# Patient Record
Sex: FEMALE | Race: WHITE | Employment: FULL TIME | URBAN - METROPOLITAN AREA
[De-identification: names, ages, dates, MRNs, and addresses within clinical notes are randomized per-mention and may not be internally consistent; named-entity substitution may affect disease eponyms.]

---

## 2017-02-16 DIAGNOSIS — R31.9 HEMATURIA: Primary | ICD-10-CM

## 2017-02-16 LAB
BILIRUBIN, POC: NORMAL
BLOOD URINE, POC: NORMAL
CLARITY, POC: NORMAL
COLOR, POC: NORMAL
GLUCOSE URINE, POC: NORMAL
KETONES, POC: NORMAL
LEUKOCYTE EST, POC: NORMAL
NITRITE, POC: NORMAL
PH, POC: 6
PROTEIN, POC: 30
SPECIFIC GRAVITY, POC: 1.03
UROBILINOGEN, POC: 1

## 2017-02-16 PROCEDURE — 81002 URINALYSIS NONAUTO W/O SCOPE: CPT | Performed by: INTERNAL MEDICINE

## 2017-02-17 LAB — URINE CULTURE, ROUTINE: NORMAL

## 2017-02-22 DIAGNOSIS — R10.11 RIGHT UPPER QUADRANT ABDOMINAL PAIN: Primary | ICD-10-CM

## 2017-02-23 ENCOUNTER — HOSPITAL ENCOUNTER (OUTPATIENT)
Dept: ULTRASOUND IMAGING | Age: 37
Discharge: HOME | End: 2017-02-23
Attending: INTERNAL MEDICINE | Admitting: INTERNAL MEDICINE

## 2017-02-23 DIAGNOSIS — R10.11 ABDOMINAL PAIN, RIGHT UPPER QUADRANT: ICD-10-CM

## 2017-02-23 DIAGNOSIS — R10.11 RIGHT UPPER QUADRANT PAIN: ICD-10-CM

## 2017-02-23 DIAGNOSIS — R10.11 RIGHT UPPER QUADRANT ABDOMINAL PAIN: ICD-10-CM

## 2017-02-23 LAB
A/G RATIO: 1.7 (ref 1.1–2.2)
ALBUMIN SERPL-MCNC: 4.1 G/DL (ref 3.4–5)
ALP BLD-CCNC: 61 U/L (ref 40–129)
ALT SERPL-CCNC: 13 U/L (ref 10–40)
AMYLASE: 39 U/L (ref 25–115)
ANION GAP SERPL CALCULATED.3IONS-SCNC: 16 MMOL/L (ref 3–16)
AST SERPL-CCNC: 13 U/L (ref 15–37)
BASOPHILS ABSOLUTE: 0.1 K/UL (ref 0–0.2)
BASOPHILS RELATIVE PERCENT: 1.2 %
BILIRUB SERPL-MCNC: 0.4 MG/DL (ref 0–1)
BUN BLDV-MCNC: 13 MG/DL (ref 7–20)
CALCIUM SERPL-MCNC: 8.6 MG/DL (ref 8.3–10.6)
CHLORIDE BLD-SCNC: 102 MMOL/L (ref 99–110)
CO2: 22 MMOL/L (ref 21–32)
CREAT SERPL-MCNC: 0.6 MG/DL (ref 0.6–1.1)
EOSINOPHILS ABSOLUTE: 0.3 K/UL (ref 0–0.6)
EOSINOPHILS RELATIVE PERCENT: 4.8 %
GFR AFRICAN AMERICAN: >60
GFR NON-AFRICAN AMERICAN: >60
GLOBULIN: 2.4 G/DL
GLUCOSE BLD-MCNC: 83 MG/DL (ref 70–99)
HCT VFR BLD CALC: 41.9 % (ref 36–48)
HEMOGLOBIN: 14.2 G/DL (ref 12–16)
LIPASE: 57 U/L (ref 13–60)
LYMPHOCYTES ABSOLUTE: 1.4 K/UL (ref 1–5.1)
LYMPHOCYTES RELATIVE PERCENT: 25.5 %
MCH RBC QN AUTO: 31.6 PG (ref 26–34)
MCHC RBC AUTO-ENTMCNC: 33.9 G/DL (ref 31–36)
MCV RBC AUTO: 93.4 FL (ref 80–100)
MONOCYTES ABSOLUTE: 0.3 K/UL (ref 0–1.3)
MONOCYTES RELATIVE PERCENT: 6.1 %
NEUTROPHILS ABSOLUTE: 3.5 K/UL (ref 1.7–7.7)
NEUTROPHILS RELATIVE PERCENT: 62.4 %
PDW BLD-RTO: 12.3 % (ref 12.4–15.4)
PLATELET # BLD: 219 K/UL (ref 135–450)
PMV BLD AUTO: 10.4 FL (ref 5–10.5)
POTASSIUM SERPL-SCNC: 3.9 MMOL/L (ref 3.5–5.1)
RBC # BLD: 4.49 M/UL (ref 4–5.2)
SODIUM BLD-SCNC: 140 MMOL/L (ref 136–145)
TOTAL PROTEIN: 6.5 G/DL (ref 6.4–8.2)
WBC # BLD: 5.7 K/UL (ref 4–11)

## 2017-03-01 DIAGNOSIS — R10.11 ABDOMINAL PAIN, ACUTE, RIGHT UPPER QUADRANT: Primary | ICD-10-CM

## 2017-06-16 ENCOUNTER — TELEPHONE (OUTPATIENT)
Dept: INTERNAL MEDICINE CLINIC | Age: 37
End: 2017-06-16

## 2017-06-16 RX ORDER — AZITHROMYCIN 250 MG/1
TABLET, FILM COATED ORAL
Qty: 1 PACKET | Refills: 0 | Status: SHIPPED | OUTPATIENT
Start: 2017-06-16 | End: 2017-06-26

## 2017-08-28 ENCOUNTER — TELEPHONE (OUTPATIENT)
Dept: INTERNAL MEDICINE CLINIC | Age: 37
End: 2017-08-28

## 2017-08-28 RX ORDER — VALACYCLOVIR HYDROCHLORIDE 1 G/1
1000 TABLET, FILM COATED ORAL 2 TIMES DAILY
Qty: 20 TABLET | Refills: 0 | Status: SHIPPED | OUTPATIENT
Start: 2017-08-28 | End: 2017-10-27 | Stop reason: SDUPTHER

## 2017-09-13 ENCOUNTER — OFFICE VISIT (OUTPATIENT)
Dept: INTERNAL MEDICINE CLINIC | Age: 37
End: 2017-09-13

## 2017-09-13 DIAGNOSIS — M77.12 LATERAL EPICONDYLITIS OF LEFT ELBOW: Primary | ICD-10-CM

## 2017-09-13 PROCEDURE — 20605 DRAIN/INJ JOINT/BURSA W/O US: CPT | Performed by: INTERNAL MEDICINE

## 2017-10-19 PROCEDURE — 96372 THER/PROPH/DIAG INJ SC/IM: CPT | Performed by: INTERNAL MEDICINE

## 2017-10-19 RX ORDER — KETOROLAC TROMETHAMINE 30 MG/ML
60 INJECTION, SOLUTION INTRAMUSCULAR; INTRAVENOUS ONCE
Status: COMPLETED | OUTPATIENT
Start: 2017-10-19 | End: 2017-10-19

## 2017-10-19 RX ADMIN — KETOROLAC TROMETHAMINE 60 MG: 30 INJECTION, SOLUTION INTRAMUSCULAR; INTRAVENOUS at 12:00

## 2017-10-27 RX ORDER — VALACYCLOVIR HYDROCHLORIDE 1 G/1
1000 TABLET, FILM COATED ORAL 2 TIMES DAILY
Qty: 20 TABLET | Refills: 0 | Status: SHIPPED | OUTPATIENT
Start: 2017-10-27 | End: 2018-04-03 | Stop reason: SDUPTHER

## 2017-12-07 ENCOUNTER — TELEPHONE (OUTPATIENT)
Dept: INTERNAL MEDICINE CLINIC | Age: 37
End: 2017-12-07

## 2017-12-07 RX ORDER — GUAIFENESIN AND CODEINE PHOSPHATE 100; 10 MG/5ML; MG/5ML
5 SOLUTION ORAL 2 TIMES DAILY PRN
Qty: 120 ML | Refills: 0 | Status: SHIPPED | OUTPATIENT
Start: 2017-12-07 | End: 2017-12-14

## 2017-12-07 NOTE — TELEPHONE ENCOUNTER
Pt asking for a message to be sent back to . She has had flu like symptoms all week. Has been out of work all week as well. She was prescribed Tessalon pearls and is taking them. She is also taking Nyquil. She would like to know if  could please prescribe a cough medicine for her cough during the evening? Dr Claudette Hopkins is not in today. Asking if covering  would please address.

## 2017-12-08 RX ORDER — FUROSEMIDE 20 MG/1
TABLET ORAL
Qty: 90 TABLET | Refills: 1 | Status: SHIPPED | OUTPATIENT
Start: 2017-12-08 | End: 2019-01-08 | Stop reason: SDUPTHER

## 2017-12-22 ENCOUNTER — TELEPHONE (OUTPATIENT)
Dept: INTERNAL MEDICINE CLINIC | Age: 37
End: 2017-12-22

## 2017-12-22 RX ORDER — OSELTAMIVIR PHOSPHATE 75 MG/1
75 CAPSULE ORAL 2 TIMES DAILY
Qty: 10 CAPSULE | Refills: 0 | Status: SHIPPED | OUTPATIENT
Start: 2017-12-22 | End: 2017-12-27

## 2017-12-29 ENCOUNTER — OFFICE VISIT (OUTPATIENT)
Dept: INTERNAL MEDICINE CLINIC | Age: 37
End: 2017-12-29

## 2017-12-29 VITALS
HEART RATE: 84 BPM | OXYGEN SATURATION: 100 % | TEMPERATURE: 98.1 F | SYSTOLIC BLOOD PRESSURE: 126 MMHG | DIASTOLIC BLOOD PRESSURE: 84 MMHG

## 2017-12-29 DIAGNOSIS — J45.41 MODERATE PERSISTENT ASTHMATIC BRONCHITIS WITH ACUTE EXACERBATION: ICD-10-CM

## 2017-12-29 DIAGNOSIS — J18.9 COMMUNITY ACQUIRED PNEUMONIA OF RIGHT LUNG, UNSPECIFIED PART OF LUNG: Primary | ICD-10-CM

## 2017-12-29 PROCEDURE — 99213 OFFICE O/P EST LOW 20 MIN: CPT | Performed by: NURSE PRACTITIONER

## 2017-12-29 RX ORDER — METHYLPREDNISOLONE 4 MG/1
TABLET ORAL
Qty: 1 KIT | Refills: 0 | Status: SHIPPED | OUTPATIENT
Start: 2017-12-29 | End: 2018-01-04

## 2017-12-29 RX ORDER — GUAIFENESIN AND CODEINE PHOSPHATE 100; 10 MG/5ML; MG/5ML
10 SOLUTION ORAL 2 TIMES DAILY PRN
Qty: 280 ML | Refills: 0 | Status: SHIPPED | OUTPATIENT
Start: 2017-12-29 | End: 2018-01-05

## 2017-12-29 RX ORDER — ALBUTEROL SULFATE 90 UG/1
1 AEROSOL, METERED RESPIRATORY (INHALATION) EVERY 4 HOURS PRN
Qty: 1 INHALER | Refills: 5 | Status: SHIPPED | OUTPATIENT
Start: 2017-12-29 | End: 2018-11-20 | Stop reason: SDUPTHER

## 2017-12-29 RX ORDER — AZITHROMYCIN 250 MG/1
TABLET, FILM COATED ORAL
Qty: 1 PACKET | Refills: 0 | Status: SHIPPED | OUTPATIENT
Start: 2017-12-29 | End: 2018-01-03

## 2018-01-01 ASSESSMENT — ENCOUNTER SYMPTOMS
SORE THROAT: 1
COUGH: 1

## 2018-01-02 NOTE — PROGRESS NOTES
Subjective:      Patient ID: Connor Fontana is a 42 y.o. female. CC: Flu, illness    HPI: The patient presents to the office today for an acute visit. She was diagnosed with influenza last week. She was treated with Tamiflu. Today, she reports ongoing and worsening illness with congestion, fevers, cough. She has taken numerous OTC remedies. Review of Systems   Constitutional: Positive for chills, diaphoresis, fatigue and fever. HENT: Positive for congestion and sore throat. Respiratory: Positive for cough. Objective:   Physical Exam   Constitutional: She appears well-developed and well-nourished. She is cooperative. Non-toxic appearance. She appears ill. No distress. HENT:   Head: Normocephalic and atraumatic. Eyes: Conjunctivae are normal. No scleral icterus. Pulmonary/Chest: Effort normal. No respiratory distress. She has wheezes. She has rales in the right middle field and the right lower field. Neurological: She is alert. Skin: Skin is warm and dry. She is not diaphoretic. /84   Pulse 84   Temp 98.1 °F (36.7 °C) (Oral)   SpO2 100%         Assessment/Plan:  Ethan Coleman was seen today for shortness of breath. Diagnoses and all orders for this visit:    Community acquired pneumonia of right lung, unspecified part of lung  -     azithromycin (ZITHROMAX) 250 MG tablet; Take 2 tabs (500 mg) on Day 1, and take 1 tab (250 mg) on days 2 through 5.  -     methylPREDNISolone (MEDROL, ALFREDO,) 4 MG tablet; Take as directed  -     guaiFENesin-codeine (TUSSI-ORGANIDIN NR) 100-10 MG/5ML syrup; Take 10 mLs by mouth 2 times daily as needed for Cough for up to 7 days. -     albuterol sulfate HFA (PROVENTIL HFA) 108 (90 Base) MCG/ACT inhaler; Inhale 1 puff into the lungs every 4 hours as needed for Wheezing or Shortness of Breath    Moderate persistent asthmatic bronchitis with acute exacerbation  -     azithromycin (ZITHROMAX) 250 MG tablet;  Take 2 tabs (500 mg) on Day 1, and take 1 tab

## 2018-01-12 ENCOUNTER — OFFICE VISIT (OUTPATIENT)
Dept: INTERNAL MEDICINE CLINIC | Age: 38
End: 2018-01-12

## 2018-01-12 VITALS — SYSTOLIC BLOOD PRESSURE: 132 MMHG | HEART RATE: 88 BPM | DIASTOLIC BLOOD PRESSURE: 72 MMHG

## 2018-01-12 DIAGNOSIS — M77.12 LATERAL EPICONDYLITIS OF LEFT ELBOW: Primary | ICD-10-CM

## 2018-04-03 RX ORDER — VALACYCLOVIR HYDROCHLORIDE 1 G/1
TABLET, FILM COATED ORAL
Qty: 20 TABLET | Refills: 0 | Status: SHIPPED | OUTPATIENT
Start: 2018-04-03 | End: 2018-04-04 | Stop reason: SDUPTHER

## 2018-04-04 RX ORDER — VALACYCLOVIR HYDROCHLORIDE 1 G/1
TABLET, FILM COATED ORAL
Qty: 20 TABLET | Refills: 0 | Status: SHIPPED | OUTPATIENT
Start: 2018-04-04 | End: 2019-07-09 | Stop reason: ALTCHOICE

## 2018-06-20 ENCOUNTER — OFFICE VISIT (OUTPATIENT)
Dept: INTERNAL MEDICINE CLINIC | Age: 38
End: 2018-06-20

## 2018-06-20 VITALS
DIASTOLIC BLOOD PRESSURE: 76 MMHG | HEIGHT: 62 IN | WEIGHT: 148 LBS | SYSTOLIC BLOOD PRESSURE: 100 MMHG | HEART RATE: 84 BPM | BODY MASS INDEX: 27.23 KG/M2

## 2018-06-20 DIAGNOSIS — Z00.00 ANNUAL PHYSICAL EXAM: Primary | ICD-10-CM

## 2018-06-20 DIAGNOSIS — Z12.39 BREAST CANCER SCREENING: ICD-10-CM

## 2018-06-20 DIAGNOSIS — Z80.3 FAMILY HISTORY OF BREAST CANCER: ICD-10-CM

## 2018-06-20 DIAGNOSIS — Z23 NEED FOR 23-POLYVALENT PNEUMOCOCCAL POLYSACCHARIDE VACCINE: ICD-10-CM

## 2018-06-20 DIAGNOSIS — N92.1 MENORRHAGIA WITH IRREGULAR CYCLE: Chronic | ICD-10-CM

## 2018-06-20 LAB
A/G RATIO: 1.8 (ref 1.1–2.2)
ALBUMIN SERPL-MCNC: 4.3 G/DL (ref 3.4–5)
ALP BLD-CCNC: 68 U/L (ref 40–129)
ALT SERPL-CCNC: 13 U/L (ref 10–40)
ANION GAP SERPL CALCULATED.3IONS-SCNC: 14 MMOL/L (ref 3–16)
AST SERPL-CCNC: 13 U/L (ref 15–37)
BASOPHILS ABSOLUTE: 0.1 K/UL (ref 0–0.2)
BASOPHILS RELATIVE PERCENT: 1 %
BILIRUB SERPL-MCNC: 0.6 MG/DL (ref 0–1)
BUN BLDV-MCNC: 9 MG/DL (ref 7–20)
CALCIUM SERPL-MCNC: 8.9 MG/DL (ref 8.3–10.6)
CHLORIDE BLD-SCNC: 101 MMOL/L (ref 99–110)
CHOLESTEROL, TOTAL: 160 MG/DL (ref 0–199)
CO2: 24 MMOL/L (ref 21–32)
CREAT SERPL-MCNC: 0.5 MG/DL (ref 0.6–1.1)
EOSINOPHILS ABSOLUTE: 0.2 K/UL (ref 0–0.6)
EOSINOPHILS RELATIVE PERCENT: 2.6 %
GFR AFRICAN AMERICAN: >60
GFR NON-AFRICAN AMERICAN: >60
GLOBULIN: 2.4 G/DL
GLUCOSE BLD-MCNC: 76 MG/DL (ref 70–99)
HCT VFR BLD CALC: 41.8 % (ref 36–48)
HDLC SERPL-MCNC: 83 MG/DL (ref 40–60)
HEMOGLOBIN: 14.4 G/DL (ref 12–16)
LDL CHOLESTEROL CALCULATED: 60 MG/DL
LYMPHOCYTES ABSOLUTE: 1.8 K/UL (ref 1–5.1)
LYMPHOCYTES RELATIVE PERCENT: 27.7 %
MCH RBC QN AUTO: 32.6 PG (ref 26–34)
MCHC RBC AUTO-ENTMCNC: 34.5 G/DL (ref 31–36)
MCV RBC AUTO: 94.5 FL (ref 80–100)
MONOCYTES ABSOLUTE: 0.5 K/UL (ref 0–1.3)
MONOCYTES RELATIVE PERCENT: 7.8 %
NEUTROPHILS ABSOLUTE: 3.9 K/UL (ref 1.7–7.7)
NEUTROPHILS RELATIVE PERCENT: 60.9 %
PDW BLD-RTO: 13.2 % (ref 12.4–15.4)
PLATELET # BLD: 236 K/UL (ref 135–450)
PMV BLD AUTO: 10.6 FL (ref 5–10.5)
POTASSIUM SERPL-SCNC: 4.1 MMOL/L (ref 3.5–5.1)
RBC # BLD: 4.43 M/UL (ref 4–5.2)
SODIUM BLD-SCNC: 139 MMOL/L (ref 136–145)
TOTAL PROTEIN: 6.7 G/DL (ref 6.4–8.2)
TRIGL SERPL-MCNC: 83 MG/DL (ref 0–150)
VLDLC SERPL CALC-MCNC: 17 MG/DL
WBC # BLD: 6.4 K/UL (ref 4–11)

## 2018-06-20 PROCEDURE — 90732 PPSV23 VACC 2 YRS+ SUBQ/IM: CPT | Performed by: NURSE PRACTITIONER

## 2018-06-20 PROCEDURE — 99396 PREV VISIT EST AGE 40-64: CPT | Performed by: NURSE PRACTITIONER

## 2018-06-20 PROCEDURE — 90471 IMMUNIZATION ADMIN: CPT | Performed by: NURSE PRACTITIONER

## 2018-08-22 ENCOUNTER — HOSPITAL ENCOUNTER (OUTPATIENT)
Dept: MAMMOGRAPHY | Age: 38
Discharge: HOME | End: 2018-08-22
Attending: INTERNAL MEDICINE | Admitting: INTERNAL MEDICINE

## 2018-08-22 DIAGNOSIS — Z12.31 VISIT FOR SCREENING MAMMOGRAM: ICD-10-CM

## 2018-09-12 DIAGNOSIS — M77.12 LATERAL EPICONDYLITIS OF LEFT ELBOW: Primary | ICD-10-CM

## 2018-11-20 ENCOUNTER — OFFICE VISIT (OUTPATIENT)
Dept: INTERNAL MEDICINE CLINIC | Age: 38
End: 2018-11-20
Payer: COMMERCIAL

## 2018-11-20 VITALS
SYSTOLIC BLOOD PRESSURE: 122 MMHG | DIASTOLIC BLOOD PRESSURE: 86 MMHG | HEIGHT: 61 IN | HEART RATE: 96 BPM | BODY MASS INDEX: 27.96 KG/M2

## 2018-11-20 DIAGNOSIS — B00.2 RECURRENT ORAL HERPES SIMPLEX: Primary | ICD-10-CM

## 2018-11-20 DIAGNOSIS — E66.09 OBESITY DUE TO EXCESS CALORIES WITHOUT SERIOUS COMORBIDITY, UNSPECIFIED CLASSIFICATION: ICD-10-CM

## 2018-11-20 DIAGNOSIS — J45.41 MODERATE PERSISTENT ASTHMATIC BRONCHITIS WITH ACUTE EXACERBATION: ICD-10-CM

## 2018-11-20 PROCEDURE — 99214 OFFICE O/P EST MOD 30 MIN: CPT | Performed by: INTERNAL MEDICINE

## 2018-11-20 RX ORDER — ALBUTEROL SULFATE 90 UG/1
1 AEROSOL, METERED RESPIRATORY (INHALATION) EVERY 4 HOURS PRN
Qty: 1 INHALER | Refills: 5 | Status: SHIPPED | OUTPATIENT
Start: 2018-11-20 | End: 2019-08-26 | Stop reason: SDUPTHER

## 2018-11-20 RX ORDER — VALACYCLOVIR HYDROCHLORIDE 500 MG/1
500 TABLET, FILM COATED ORAL DAILY
Qty: 90 TABLET | Refills: 3 | Status: SHIPPED | OUTPATIENT
Start: 2018-11-20 | End: 2019-02-12 | Stop reason: SDUPTHER

## 2018-11-20 ASSESSMENT — ENCOUNTER SYMPTOMS
COUGH: 0
BLOOD IN STOOL: 0
SHORTNESS OF BREATH: 0
DIARRHEA: 0
CONSTIPATION: 0

## 2018-12-04 RX ORDER — NITROFURANTOIN 25; 75 MG/1; MG/1
100 CAPSULE ORAL 2 TIMES DAILY
Qty: 6 CAPSULE | Refills: 0 | Status: SHIPPED | OUTPATIENT
Start: 2018-12-04 | End: 2018-12-07

## 2019-01-08 RX ORDER — FUROSEMIDE 20 MG/1
20 TABLET ORAL DAILY
Qty: 90 TABLET | Refills: 1 | Status: SHIPPED | OUTPATIENT
Start: 2019-01-08 | End: 2019-02-12 | Stop reason: SDUPTHER

## 2019-01-14 ENCOUNTER — HOSPITAL ENCOUNTER (OUTPATIENT)
Dept: ULTRASOUND IMAGING | Age: 39
Discharge: HOME | End: 2019-01-14
Payer: COMMERCIAL

## 2019-01-14 ENCOUNTER — HOSPITAL ENCOUNTER (OUTPATIENT)
Dept: WOMENS IMAGING | Age: 39
Discharge: HOME | End: 2019-01-14
Payer: COMMERCIAL

## 2019-01-14 DIAGNOSIS — N63.0 BREAST MASS: ICD-10-CM

## 2019-01-14 DIAGNOSIS — N63.10 BREAST MASS, RIGHT: Primary | ICD-10-CM

## 2019-01-14 DIAGNOSIS — N63.10 BREAST MASS, RIGHT: ICD-10-CM

## 2019-01-14 PROCEDURE — G0279 TOMOSYNTHESIS, MAMMO: HCPCS

## 2019-01-14 PROCEDURE — 76642 ULTRASOUND BREAST LIMITED: CPT

## 2019-02-12 DIAGNOSIS — B00.2 RECURRENT ORAL HERPES SIMPLEX: ICD-10-CM

## 2019-02-12 RX ORDER — FUROSEMIDE 20 MG/1
20 TABLET ORAL DAILY
Qty: 90 TABLET | Refills: 1 | Status: SHIPPED | OUTPATIENT
Start: 2019-02-12 | End: 2019-11-05 | Stop reason: SDUPTHER

## 2019-02-12 RX ORDER — VALACYCLOVIR HYDROCHLORIDE 500 MG/1
500 TABLET, FILM COATED ORAL DAILY
Qty: 90 TABLET | Refills: 3 | Status: SHIPPED | OUTPATIENT
Start: 2019-02-12 | End: 2019-02-12 | Stop reason: SDUPTHER

## 2019-02-12 RX ORDER — VALACYCLOVIR HYDROCHLORIDE 500 MG/1
500 TABLET, FILM COATED ORAL DAILY
Qty: 90 TABLET | Refills: 3 | Status: SHIPPED | OUTPATIENT
Start: 2019-02-12 | End: 2019-11-19 | Stop reason: SDUPTHER

## 2019-02-12 RX ORDER — FUROSEMIDE 20 MG/1
20 TABLET ORAL DAILY
Qty: 90 TABLET | Refills: 1 | Status: SHIPPED | OUTPATIENT
Start: 2019-02-12 | End: 2019-02-12 | Stop reason: SDUPTHER

## 2019-02-18 ENCOUNTER — HOSPITAL ENCOUNTER (OUTPATIENT)
Age: 39
Discharge: HOME | End: 2019-02-18
Payer: COMMERCIAL

## 2019-02-18 ENCOUNTER — HOSPITAL ENCOUNTER (OUTPATIENT)
Dept: GENERAL RADIOLOGY | Age: 39
Discharge: HOME | End: 2019-02-18
Payer: COMMERCIAL

## 2019-02-18 DIAGNOSIS — M77.12 LATERAL EPICONDYLITIS OF LEFT ELBOW: ICD-10-CM

## 2019-02-18 PROCEDURE — 73080 X-RAY EXAM OF ELBOW: CPT

## 2019-02-19 ENCOUNTER — OFFICE VISIT (OUTPATIENT)
Dept: RHEUMATOLOGY | Age: 39
End: 2019-02-19
Payer: COMMERCIAL

## 2019-02-19 VITALS
BODY MASS INDEX: 28.34 KG/M2 | HEIGHT: 62 IN | DIASTOLIC BLOOD PRESSURE: 80 MMHG | HEART RATE: 84 BPM | WEIGHT: 154 LBS | SYSTOLIC BLOOD PRESSURE: 138 MMHG

## 2019-02-19 DIAGNOSIS — M19.90 INFLAMMATORY ARTHRITIS: ICD-10-CM

## 2019-02-19 DIAGNOSIS — M19.90 INFLAMMATORY ARTHRITIS: Primary | ICD-10-CM

## 2019-02-19 DIAGNOSIS — I73.00 RAYNAUD'S DISEASE WITHOUT GANGRENE: ICD-10-CM

## 2019-02-19 DIAGNOSIS — M77.12 LATERAL EPICONDYLITIS OF LEFT ELBOW: ICD-10-CM

## 2019-02-19 DIAGNOSIS — M77.01 GOLFERS ELBOW OF RIGHT UPPER EXTREMITY: ICD-10-CM

## 2019-02-19 DIAGNOSIS — Q84.6 DYSPLASIA OF TOENAIL: ICD-10-CM

## 2019-02-19 LAB
ALBUMIN SERPL-MCNC: 4.4 G/DL (ref 3.4–5)
ALP BLD-CCNC: 62 U/L (ref 40–129)
ALT SERPL-CCNC: 14 U/L (ref 10–40)
AST SERPL-CCNC: 14 U/L (ref 15–37)
BILIRUB SERPL-MCNC: 0.4 MG/DL (ref 0–1)
BILIRUBIN DIRECT: <0.2 MG/DL (ref 0–0.3)
BILIRUBIN, INDIRECT: ABNORMAL MG/DL (ref 0–1)
C-REACTIVE PROTEIN: 3.7 MG/L (ref 0–5.1)
CREAT SERPL-MCNC: 0.6 MG/DL (ref 0.6–1.1)
GFR AFRICAN AMERICAN: >60
GFR NON-AFRICAN AMERICAN: >60
HCT VFR BLD CALC: 42 % (ref 36–48)
HEMOGLOBIN: 14.5 G/DL (ref 12–16)
MCH RBC QN AUTO: 32.2 PG (ref 26–34)
MCHC RBC AUTO-ENTMCNC: 34.5 G/DL (ref 31–36)
MCV RBC AUTO: 93.4 FL (ref 80–100)
PDW BLD-RTO: 13.1 % (ref 12.4–15.4)
PLATELET # BLD: 239 K/UL (ref 135–450)
PMV BLD AUTO: 10.4 FL (ref 5–10.5)
RBC # BLD: 4.5 M/UL (ref 4–5.2)
RHEUMATOID FACTOR: <10 IU/ML
TOTAL CK: 45 U/L (ref 26–192)
TOTAL PROTEIN: 7 G/DL (ref 6.4–8.2)
TSH REFLEX: 1.83 UIU/ML (ref 0.27–4.2)
VITAMIN D 25-HYDROXY: 20.6 NG/ML
WBC # BLD: 5.2 K/UL (ref 4–11)

## 2019-02-19 PROCEDURE — 99242 OFF/OP CONSLTJ NEW/EST SF 20: CPT | Performed by: INTERNAL MEDICINE

## 2019-02-19 RX ORDER — NIFEDIPINE 30 MG/1
30 TABLET, EXTENDED RELEASE ORAL DAILY
Qty: 90 TABLET | Refills: 1 | Status: SHIPPED | OUTPATIENT
Start: 2019-02-19 | End: 2019-07-09 | Stop reason: ALTCHOICE

## 2019-02-19 RX ORDER — DICLOFENAC SODIUM 75 MG/1
75 TABLET, DELAYED RELEASE ORAL 2 TIMES DAILY
Qty: 60 TABLET | Refills: 4 | Status: SHIPPED | OUTPATIENT
Start: 2019-02-19 | End: 2019-07-09 | Stop reason: ALTCHOICE

## 2019-02-20 LAB
ALDOLASE: 2 U/L (ref 1.5–8.1)
ANGIOTENSIN CONVERTING ENZYME: 23 U/L (ref 9–67)
ANTI-NUCLEAR ANTIBODY (ANA): NEGATIVE

## 2019-02-21 LAB — CCP IGG ANTIBODIES: 5 UNITS (ref 0–19)

## 2019-03-07 ENCOUNTER — OFFICE VISIT (OUTPATIENT)
Dept: ORTHOPEDIC SURGERY | Age: 39
End: 2019-03-07
Payer: COMMERCIAL

## 2019-03-07 VITALS
BODY MASS INDEX: 28.34 KG/M2 | WEIGHT: 154 LBS | HEIGHT: 62 IN | HEART RATE: 98 BPM | SYSTOLIC BLOOD PRESSURE: 123 MMHG | DIASTOLIC BLOOD PRESSURE: 83 MMHG

## 2019-03-07 DIAGNOSIS — M77.12 LATERAL EPICONDYLITIS OF LEFT ELBOW: Primary | ICD-10-CM

## 2019-03-07 PROCEDURE — 99203 OFFICE O/P NEW LOW 30 MIN: CPT | Performed by: NURSE PRACTITIONER

## 2019-03-07 RX ORDER — PREDNISONE 10 MG/1
TABLET ORAL
Qty: 42 TABLET | Refills: 0 | Status: SHIPPED | OUTPATIENT
Start: 2019-03-07 | End: 2019-07-09 | Stop reason: ALTCHOICE

## 2019-03-08 ENCOUNTER — TELEPHONE (OUTPATIENT)
Dept: ORTHOPEDIC SURGERY | Age: 39
End: 2019-03-08

## 2019-03-12 DIAGNOSIS — M25.522 LEFT ELBOW PAIN: Primary | ICD-10-CM

## 2019-04-04 ENCOUNTER — HOSPITAL ENCOUNTER (OUTPATIENT)
Dept: MRI IMAGING | Age: 39
Discharge: HOME | End: 2019-04-04
Payer: COMMERCIAL

## 2019-04-04 DIAGNOSIS — M25.522 LEFT ELBOW PAIN: ICD-10-CM

## 2019-04-04 PROCEDURE — 73221 MRI JOINT UPR EXTREM W/O DYE: CPT

## 2019-04-10 ENCOUNTER — OFFICE VISIT (OUTPATIENT)
Dept: ORTHOPEDIC SURGERY | Age: 39
End: 2019-04-10
Payer: COMMERCIAL

## 2019-04-10 VITALS
SYSTOLIC BLOOD PRESSURE: 133 MMHG | HEIGHT: 62 IN | BODY MASS INDEX: 28.34 KG/M2 | DIASTOLIC BLOOD PRESSURE: 84 MMHG | HEART RATE: 98 BPM | WEIGHT: 154 LBS | RESPIRATION RATE: 16 BRPM

## 2019-04-10 DIAGNOSIS — M77.12 LATERAL EPICONDYLITIS OF LEFT ELBOW: Primary | ICD-10-CM

## 2019-04-10 PROCEDURE — L3908 WHO COCK-UP NONMOLDE PRE OTS: HCPCS | Performed by: ORTHOPAEDIC SURGERY

## 2019-04-10 PROCEDURE — 99203 OFFICE O/P NEW LOW 30 MIN: CPT | Performed by: ORTHOPAEDIC SURGERY

## 2019-07-09 ENCOUNTER — OFFICE VISIT (OUTPATIENT)
Dept: INTERNAL MEDICINE CLINIC | Age: 39
End: 2019-07-09
Payer: COMMERCIAL

## 2019-07-09 VITALS — DIASTOLIC BLOOD PRESSURE: 84 MMHG | HEART RATE: 86 BPM | SYSTOLIC BLOOD PRESSURE: 124 MMHG

## 2019-07-09 DIAGNOSIS — J45.20 MILD INTERMITTENT ASTHMA, UNSPECIFIED WHETHER COMPLICATED: ICD-10-CM

## 2019-07-09 DIAGNOSIS — F41.9 ANXIETY: ICD-10-CM

## 2019-07-09 PROBLEM — J45.909 ASTHMA: Status: ACTIVE | Noted: 2019-07-09

## 2019-07-09 PROCEDURE — 99213 OFFICE O/P EST LOW 20 MIN: CPT | Performed by: INTERNAL MEDICINE

## 2019-07-09 ASSESSMENT — PATIENT HEALTH QUESTIONNAIRE - PHQ9
SUM OF ALL RESPONSES TO PHQ QUESTIONS 1-9: 0
1. LITTLE INTEREST OR PLEASURE IN DOING THINGS: 0
SUM OF ALL RESPONSES TO PHQ QUESTIONS 1-9: 0
2. FEELING DOWN, DEPRESSED OR HOPELESS: 0
SUM OF ALL RESPONSES TO PHQ9 QUESTIONS 1 & 2: 0

## 2019-08-26 DIAGNOSIS — J45.41 MODERATE PERSISTENT ASTHMATIC BRONCHITIS WITH ACUTE EXACERBATION: ICD-10-CM

## 2019-08-26 RX ORDER — ALBUTEROL SULFATE 90 UG/1
1 AEROSOL, METERED RESPIRATORY (INHALATION) EVERY 4 HOURS PRN
Qty: 1 INHALER | Refills: 5 | Status: SHIPPED | OUTPATIENT
Start: 2019-08-26 | End: 2019-11-05 | Stop reason: SDUPTHER

## 2019-08-28 ENCOUNTER — HOSPITAL ENCOUNTER (OUTPATIENT)
Dept: MAMMOGRAPHY | Age: 39
Discharge: HOME | End: 2019-09-01
Payer: COMMERCIAL

## 2019-08-28 DIAGNOSIS — Z12.31 VISIT FOR SCREENING MAMMOGRAM: ICD-10-CM

## 2019-08-28 PROCEDURE — 77063 BREAST TOMOSYNTHESIS BI: CPT

## 2019-11-05 DIAGNOSIS — J45.41 MODERATE PERSISTENT ASTHMATIC BRONCHITIS WITH ACUTE EXACERBATION: ICD-10-CM

## 2019-11-05 RX ORDER — FUROSEMIDE 20 MG/1
20 TABLET ORAL DAILY
Qty: 90 TABLET | Refills: 1 | Status: SHIPPED | OUTPATIENT
Start: 2019-11-05 | End: 2020-05-04

## 2019-11-05 RX ORDER — ALBUTEROL SULFATE 90 UG/1
1 AEROSOL, METERED RESPIRATORY (INHALATION) EVERY 4 HOURS PRN
Qty: 1 INHALER | Refills: 5 | Status: SHIPPED | OUTPATIENT
Start: 2019-11-05

## 2019-11-19 DIAGNOSIS — B00.2 RECURRENT ORAL HERPES SIMPLEX: ICD-10-CM

## 2019-11-19 RX ORDER — VALACYCLOVIR HYDROCHLORIDE 500 MG/1
500 TABLET, FILM COATED ORAL DAILY
Qty: 90 TABLET | Refills: 3 | Status: SHIPPED | OUTPATIENT
Start: 2019-11-19 | End: 2020-12-21

## 2020-02-04 ENCOUNTER — OFFICE VISIT (OUTPATIENT)
Dept: INTERNAL MEDICINE CLINIC | Age: 40
End: 2020-02-04
Payer: COMMERCIAL

## 2020-02-04 VITALS — HEART RATE: 99 BPM | SYSTOLIC BLOOD PRESSURE: 136 MMHG | DIASTOLIC BLOOD PRESSURE: 86 MMHG

## 2020-02-04 PROCEDURE — G0444 DEPRESSION SCREEN ANNUAL: HCPCS | Performed by: INTERNAL MEDICINE

## 2020-02-04 PROCEDURE — 99214 OFFICE O/P EST MOD 30 MIN: CPT | Performed by: INTERNAL MEDICINE

## 2020-02-04 RX ORDER — BUPROPION HYDROCHLORIDE 150 MG/1
150 TABLET ORAL EVERY MORNING
Qty: 30 TABLET | Refills: 1 | Status: SHIPPED | OUTPATIENT
Start: 2020-02-04 | End: 2020-03-09 | Stop reason: SDUPTHER

## 2020-02-04 RX ORDER — ALPRAZOLAM 0.5 MG/1
0.5 TABLET ORAL NIGHTLY PRN
Qty: 30 TABLET | Refills: 0 | Status: SHIPPED | OUTPATIENT
Start: 2020-02-04 | End: 2020-05-05 | Stop reason: SDUPTHER

## 2020-02-04 ASSESSMENT — PATIENT HEALTH QUESTIONNAIRE - PHQ9
8. MOVING OR SPEAKING SO SLOWLY THAT OTHER PEOPLE COULD HAVE NOTICED. OR THE OPPOSITE, BEING SO FIGETY OR RESTLESS THAT YOU HAVE BEEN MOVING AROUND A LOT MORE THAN USUAL: 0
1. LITTLE INTEREST OR PLEASURE IN DOING THINGS: 3
SUM OF ALL RESPONSES TO PHQ QUESTIONS 1-9: 15
4. FEELING TIRED OR HAVING LITTLE ENERGY: 3
5. POOR APPETITE OR OVEREATING: 0
3. TROUBLE FALLING OR STAYING ASLEEP: 3
2. FEELING DOWN, DEPRESSED OR HOPELESS: 3
10. IF YOU CHECKED OFF ANY PROBLEMS, HOW DIFFICULT HAVE THESE PROBLEMS MADE IT FOR YOU TO DO YOUR WORK, TAKE CARE OF THINGS AT HOME, OR GET ALONG WITH OTHER PEOPLE: 3
9. THOUGHTS THAT YOU WOULD BE BETTER OFF DEAD, OR OF HURTING YOURSELF: 0
SUM OF ALL RESPONSES TO PHQ9 QUESTIONS 1 & 2: 6
6. FEELING BAD ABOUT YOURSELF - OR THAT YOU ARE A FAILURE OR HAVE LET YOURSELF OR YOUR FAMILY DOWN: 3
7. TROUBLE CONCENTRATING ON THINGS, SUCH AS READING THE NEWSPAPER OR WATCHING TELEVISION: 0
SUM OF ALL RESPONSES TO PHQ QUESTIONS 1-9: 15

## 2020-02-04 NOTE — PROGRESS NOTES
Osiris Shankar comes for depression. She does not take medication for Her condition. She is not suicidal or homicidal.  The depression is  associated with excessive sleeping, anhedonia and anxiety. Patient does not have bipolar disease. PHQ Scores 2/4/2020 7/9/2019   PHQ2 Score 6 0   PHQ9 Score 15 0     Interpretation of Total Score Depression Severity: 1-4 = Minimal depression, 5-9 = Mild depression, 10-14 = Moderate depression, 15-19 = Moderately severe depression, 20-27 = Severe depression    Review of Systems    Vitals:    02/04/20 1517   BP: 136/86   Pulse:        Physical Exam  Vitals signs reviewed. Constitutional:       General: She is not in acute distress. Appearance: She is well-developed. She is not diaphoretic. HENT:      Head: Normocephalic and atraumatic. Neck:      Musculoskeletal: Normal range of motion. Neck rigidity present. No muscular tenderness. Vascular: No carotid bruit. Cardiovascular:      Rate and Rhythm: Normal rate and regular rhythm. Pulses: Normal pulses. Heart sounds: No murmur. No friction rub. No gallop. Pulmonary:      Effort: Pulmonary effort is normal. No respiratory distress. Breath sounds: No stridor. No wheezing, rhonchi or rales. Lymphadenopathy:      Cervical: No cervical adenopathy. Neurological:      Mental Status: She is alert and oriented to person, place, and time. Cranial Nerves: No cranial nerve deficit. Psychiatric:         Behavior: Behavior normal.         Thought Content: Thought content normal.         Judgment: Judgment normal.           Current Outpatient Medications:     ALPRAZolam (XANAX) 0.5 MG tablet, Take 1 tablet by mouth nightly as needed for Sleep for up to 30 days. , Disp: 30 tablet, Rfl: 0    buPROPion (WELLBUTRIN XL) 150 MG extended release tablet, Take 1 tablet by mouth every morning, Disp: 30 tablet, Rfl: 1    valACYclovir (VALTREX) 500 MG tablet, Take 1 tablet by mouth daily, Disp: 90

## 2020-02-05 DIAGNOSIS — F33.1 MODERATE EPISODE OF RECURRENT MAJOR DEPRESSIVE DISORDER (HCC): ICD-10-CM

## 2020-02-05 DIAGNOSIS — R00.2 PALPITATIONS: ICD-10-CM

## 2020-02-05 DIAGNOSIS — F41.9 ANXIETY: ICD-10-CM

## 2020-02-05 LAB
T4 FREE: 1.2 NG/DL (ref 0.9–1.8)
TSH SERPL DL<=0.05 MIU/L-ACNC: 1.15 UIU/ML (ref 0.27–4.2)
VITAMIN D 25-HYDROXY: 23 NG/ML

## 2020-02-06 RX ORDER — ERGOCALCIFEROL 1.25 MG/1
50000 CAPSULE ORAL WEEKLY
Qty: 4 CAPSULE | Refills: 3 | Status: SHIPPED | OUTPATIENT
Start: 2020-02-06 | End: 2020-03-06

## 2020-02-09 LAB
SEX HORMONE BINDING GLOBULIN: 52 NMOL/L (ref 30–135)
TESTOSTERONE FREE-NONMALE: 7.6 PG/ML (ref 1.1–5.8)
TESTOSTERONE TOTAL: 56 NG/DL (ref 20–70)

## 2020-05-05 ENCOUNTER — VIRTUAL VISIT (OUTPATIENT)
Dept: INTERNAL MEDICINE CLINIC | Age: 40
End: 2020-05-05
Payer: COMMERCIAL

## 2020-05-05 VITALS — TEMPERATURE: 98.3 F | BODY MASS INDEX: 28.17 KG/M2 | HEIGHT: 62 IN | HEART RATE: 100 BPM

## 2020-05-05 PROCEDURE — 99213 OFFICE O/P EST LOW 20 MIN: CPT | Performed by: INTERNAL MEDICINE

## 2020-05-05 RX ORDER — ALPRAZOLAM 0.5 MG/1
0.5 TABLET ORAL NIGHTLY PRN
Qty: 30 TABLET | Refills: 0 | Status: SHIPPED | OUTPATIENT
Start: 2020-05-05 | End: 2020-06-04

## 2020-05-05 NOTE — PROGRESS NOTES
2020      TELEHEALTH EVALUATION -- Audio/Visual (During NXBOV-06 public health emergency)    HPI:    Eugenia Valderrama (:  1980) has requested an audio/video evaluation for the following concern(s): Anxiety which has been much better recently with an increase in her Wellbutrin. Patient takes Xanax on an as-needed basis for when her anxiety gets overwhelmed. She is not using an excessive amount. Patient feels well today. She was drinking a abel at the time that she was on her video visit. PHQ Scores 2020   PHQ2 Score 6 0   PHQ9 Score 15 0     Interpretation of Total Score Depression Severity: 1-4 = Minimal depression, 5-9 = Mild depression, 10-14 = Moderate depression, 15-19 = Moderately severe depression, 20-27 = Severe depression    Review of Systems    Prior to Visit Medications    Medication Sig Taking? Authorizing Provider   ALPRAZolam Wandalee Opitz) 0.5 MG tablet Take 1 tablet by mouth nightly as needed for Sleep for up to 30 days.  Yes Werner Serna MD   furosemide (LASIX) 20 MG tablet TAKE 1 TABLET BY MOUTH ONE TIME A DAY Yes Werner Serna MD   buPROPion (WELLBUTRIN XL) 300 MG extended release tablet Take 1 tablet by mouth every morning Yes Werner Serna MD   vitamin D (ERGOCALCIFEROL) 1.25 MG (17859 UT) CAPS capsule TAKE 1 CAPSULE BY MOUTH ONCE A WEEK(TAKE FOR THREE MONTHS THEN SWITCH TO VITAMIN D3 2000IU FOR MAINTENANCE) Yes Werner Serna MD   valACYclovir (VALTREX) 500 MG tablet Take 1 tablet by mouth daily Yes Werner Serna MD   albuterol sulfate HFA (PROVENTIL HFA) 108 (90 Base) MCG/ACT inhaler Inhale 1 puff into the lungs every 4 hours as needed for Wheezing or Shortness of Breath Yes Werner Serna MD   Phentermine HCl (ADIPEX-P PO) Take by mouth Yes Historical Provider, MD       Social History     Tobacco Use    Smoking status: Former Smoker     Last attempt to quit: 10/12/2012     Years since quittin.5    Smokeless tobacco: Never Used   Substance Use (and/or legal guardian) has also been advised to contact this office for worsening conditions or problems, and seek emergency medical treatment and/or call 911 if deemed necessary. Patient identification was verified at the start of the visit: Yes    Total time spent on this encounter: Not billed by time    Services were provided through a video synchronous discussion virtually to substitute for in-person clinic visit. Patient and provider were located at their individual homes. --Lyly Siddiqui MD on 5/5/2020 at 3:47 PM    An electronic signature was used to authenticate this note.

## 2020-07-07 ENCOUNTER — OFFICE VISIT (OUTPATIENT)
Dept: ORTHOPEDIC SURGERY | Age: 40
End: 2020-07-07
Payer: COMMERCIAL

## 2020-07-07 VITALS — WEIGHT: 155 LBS | BODY MASS INDEX: 28.52 KG/M2 | HEIGHT: 62 IN

## 2020-07-07 PROCEDURE — L3760 EO ADJ JT PREFAB CUSTOM FIT: HCPCS | Performed by: ORTHOPAEDIC SURGERY

## 2020-07-07 PROCEDURE — 99203 OFFICE O/P NEW LOW 30 MIN: CPT | Performed by: ORTHOPAEDIC SURGERY

## 2020-07-07 NOTE — PROGRESS NOTES
Chief Complaint  Elbow Pain (l elbow )      History of Present Illness:  Adenike Zamudio is a 41 y.o. female , right-hand-dominant, works as an [place] at [employer], presenting with history of left elbow injury  Date of injury: 7/5/2020  Mechanism of injury: The patient stumbled while missing a step off of a boat and landed with her elbow and left side onto a boat trailer and the ground. She had immediate pain in which she describes a swelling and possible deformity. She was with a friend who is a nurse who manipulated her elbow immediately and she subsequently was placed into a splint after seeing an urgent care in an outside area  She feels that her swelling has improved overall in her left elbow but she continues to have pain. No numbness or tingling described  She has a history remotely of left elbow pain but has not had any recent pain or symptoms prior to this most specific events  She denies any other specific complaints in her left upper extremity    Medical History  Patient's medications, allergies, past medical, surgical, social and family histories were reviewed and updated as appropriate. Review of Systems  Pertinent items are noted in HPI  Review of systems reviewed from Patient History Form dated on 7/7/20 in [facility] and available in the patient's chart under the Media tab. Vital Signs  There were no vitals filed for this visit. General Exam:   Constitutional: Patient is adequately groomed with no evidence of malnutrition  Mental Status: The patient is oriented to time, place and person. The patient's mood and affect are appropriate. Lymphatic: The lymphatic examination bilaterally reveals all areas to be without enlargement or induration. Neurological: The patient has good coordination. There is no weakness or sensory deficit.      Left Elbow/left upper extremity examination  Inspection: Minimal swelling of the left elbow with small abrasion posteriorly  No evidence of obvious deformity or additional skin changes at the elbow forearm or wrist      Palpation: Tenderness to palpation at the lateral elbow specifically near the radiocapitellar joint and more diffusely tenderness at the medial ulnohumeral joint  No palpable gap, crepitus, or prominence    Range of Motion: Elbow range of motion tested with hesitation today by patient approximately 20 to 120 degrees of flexion with no obvious crepitus or gross instability  70 degrees of pronation and supination with no mechanical symptoms and mild discomfort    Strength: Active elbow flexion and extension with no gross instability  5 out of 5 strength EPL FPL thumb abduction  5-5 AIN/PIN/interossei:    Special Tests: Gross sensation intact median ulnar radial nerve without deficit  Brisk capillary refill all fingers  Negative Tinel's at cubital tunnel  2+ brachioradialis triceps tendon reflex negative Oz sign bilaterally      Skin: There are no additional worrisome rashes, ulcerations or lesions. Gait: normal    Circulation normal    Additional Comments:     Additional Examinations:  Right Upper Extremity:  Examination of the right upper extremity does not show any tenderness, deformity or injury. Range of motion is unremarkable. There is no gross instability. There are no rashes, ulcerations or lesions. Strength and tone are normal.      Radiology:     X-rays obtained and reviewed in office:  Views 3  Location left elbow  Impression no obvious fracture dislocation or subluxation throughout the left ulnohumeral radiocapitellar joint  No evidence of loose body or additional osseous abnormality    Assessment: 41-year-old female presenting with fall left elbow pain  1. Left elbow sprain, possible simple elbow dislocation with subsequent reduction, possible occult fracture    Impression:  Encounter Diagnosis   Name Primary?     Left elbow pain Yes       Office Procedures:  Orders Placed This Encounter   Procedures    XR ELBOW LEFT (MIN 3 VIEWS) Treatment Plan: I spoke with patient today regarding her exam as well as differential diagnosis  She does describe a history of a possible elbow deformity and swelling right after her injury. She does not have much swelling or elbow effusion today but she may have had a dislocation or subluxation event that was reduced at the time of the injury. Reports from outside facility reported possible occult fracture per the patient's description. I do not appreciate any fracture on today's images but I did discuss the possibility of an occult radial head or additional fracture that may not be seen on imaging. At this point she demonstrates clinically a grossly stable elbow with range of motion gently tested today. I would recommend at this point plan to place the patient into a hinged elbow brace and keeping it locked over the next 1 week to treat as a suspected elbow dislocation or instability event. We will monitor closely and see her back in 1 week for repeat clinical evaluation. If she demonstrates continued stability then at that point I would recommend beginning some progressive elbow range of motion guided and possible therapy for assistance  She will continue at this point to avoid lifting or gripping with the left upper extremity.   She understands precautions today and may continue with oral anti-inflammatory as needed for her discomfort

## 2020-07-09 ENCOUNTER — TELEPHONE (OUTPATIENT)
Dept: ORTHOPEDIC SURGERY | Age: 40
End: 2020-07-09

## 2020-07-09 NOTE — TELEPHONE ENCOUNTER
07/09/2020 DME   NO AUTHORIZATION REQUIRED. MEDICAL NECESSITY. VALID & BILLABLE. TIER 1 -EMPLOYEE    IND  DED  $1000/ $  152.35 MET  FAM DED  $2000/ $1213.51 MET    CO INS      90/10 AFTER DED  CO INS      $1000/ $0 MET  CO INS      $2000/ $257.81 MET    IND  OOP  $2000/ $  192.35  FAM OOP  $4000/ $5519.86    PER [facility] @ [facility] 144 REF [id number].   AP

## 2020-09-14 ENCOUNTER — VIRTUAL VISIT (OUTPATIENT)
Dept: INTERNAL MEDICINE CLINIC | Age: 40
End: 2020-09-14
Payer: COMMERCIAL

## 2020-09-14 PROCEDURE — 99212 OFFICE O/P EST SF 10 MIN: CPT | Performed by: INTERNAL MEDICINE

## 2020-09-14 ASSESSMENT — PATIENT HEALTH QUESTIONNAIRE - PHQ9
SUM OF ALL RESPONSES TO PHQ9 QUESTIONS 1 & 2: 0
2. FEELING DOWN, DEPRESSED OR HOPELESS: 0
SUM OF ALL RESPONSES TO PHQ QUESTIONS 1-9: 0
SUM OF ALL RESPONSES TO PHQ QUESTIONS 1-9: 0
1. LITTLE INTEREST OR PLEASURE IN DOING THINGS: 0

## 2020-09-14 NOTE — PROGRESS NOTES
2020      TELEHEALTH EVALUATION -- Audio/Visual (During YKQIT-23 public health emergency)    HPI:    Jame Orozco (:  1980) has requested an audio/video evaluation for the following concern(s): Anxiety/depression. The patient has been taking her Wellbutrin and has been doing much better. She has no new complaints today. Patient reports that she has been adjusting to her 's issues from a health perspective since his transient ischemic attack several months ago. PHQ Scores 2020   PHQ2 Score 0 6 0   PHQ9 Score 0 15 0     Interpretation of Total Score Depression Severity: 1-4 = Minimal depression, 5-9 = Mild depression, 10-14 = Moderate depression, 15-19 = Moderately severe depression, 20-27 = Severe depression    Review of Systems    Prior to Visit Medications    Medication Sig Taking? Authorizing Provider   furosemide (LASIX) 20 MG tablet TAKE 1 TABLET BY MOUTH ONE TIME A DAY Yes Vidhya Gutiérrez MD   buPROPion (WELLBUTRIN XL) 300 MG extended release tablet Take 1 tablet by mouth every morning Yes Vidhya Gutiérrez MD   valACYclovir (VALTREX) 500 MG tablet Take 1 tablet by mouth daily Yes Vidhya Gutiérrez MD   albuterol sulfate HFA (PROVENTIL HFA) 108 (90 Base) MCG/ACT inhaler Inhale 1 puff into the lungs every 4 hours as needed for Wheezing or Shortness of Breath Yes Vidhya Gutiérrez MD   Phentermine HCl (ADIPEX-P PO) Take by mouth Yes Historical Provider, MD       Social History     Tobacco Use    Smoking status: Former Smoker     Last attempt to quit: 10/12/2012     Years since quittin.9    Smokeless tobacco: Never Used   Substance Use Topics    Alcohol use: Yes    Drug use: No          PHYSICAL EXAMINATION:  There were no vitals filed for this visit. Physical Exam  Vitals signs reviewed. Constitutional:       Appearance: Normal appearance. HENT:      Head: Normocephalic and atraumatic. Eyes:      Extraocular Movements: Extraocular movements intact. Conjunctiva/sclera: Conjunctivae normal.      Pupils: Pupils are equal, round, and reactive to light. Pulmonary:      Effort: Pulmonary effort is normal.   Neurological:      General: No focal deficit present. Mental Status: She is alert and oriented to person, place, and time. Cranial Nerves: No cranial nerve deficit. Psychiatric:         Mood and Affect: Mood normal.         Behavior: Behavior normal.         Thought Content: Thought content normal.         Judgment: Judgment normal.         ASSESSMENT/PLAN:  Assessment/Plan:  Samia Gomez was seen today for other. Diagnoses and all orders for this visit:    Moderate episode of recurrent major depressive disorder (HCC)  -     CBC Auto Differential; Future  -     Comprehensive Metabolic Panel; Future  -     Lipid Panel; Future  -     T4, Free; Future  -     TSH without Reflex; Future        No follow-ups on file. Reshma Fountain is a 41 y.o. female being evaluated by a Virtual Visit (video visit) encounter to address concerns as mentioned above. A caregiver was present when appropriate. Due to this being a TeleHealth encounter (During XBF-06 public health emergency), evaluation of the following organ systems was limited: Vitals/Constitutional/EENT/Resp/CV/GI//MS/Neuro/Skin/Heme-Lymph-Imm. Pursuant to the emergency declaration under the [address] [place] [address] authority and the [employer] Act, this Virtual Visit was conducted with patient's (and/or legal guardian's) consent, to reduce the patient's risk of exposure to COVID-19 and provide necessary medical care. The patient (and/or legal guardian) has also been advised to contact this office for worsening conditions or problems, and seek emergency medical treatment and/or call 911 if deemed necessary.      Patient identification was verified at the start of the visit: Yes    Total time spent on this

## 2020-09-15 DIAGNOSIS — F33.1 MODERATE EPISODE OF RECURRENT MAJOR DEPRESSIVE DISORDER (HCC): ICD-10-CM

## 2020-09-15 LAB
A/G RATIO: 1.7 (ref 1.1–2.2)
ALBUMIN SERPL-MCNC: 4.5 G/DL (ref 3.4–5)
ALP BLD-CCNC: 71 U/L (ref 40–129)
ALT SERPL-CCNC: 23 U/L (ref 10–40)
ANION GAP SERPL CALCULATED.3IONS-SCNC: 12 MMOL/L (ref 3–16)
AST SERPL-CCNC: 24 U/L (ref 15–37)
BASOPHILS ABSOLUTE: 0.1 K/UL (ref 0–0.2)
BASOPHILS RELATIVE PERCENT: 1.2 %
BILIRUB SERPL-MCNC: 0.4 MG/DL (ref 0–1)
BUN BLDV-MCNC: 9 MG/DL (ref 7–20)
CALCIUM SERPL-MCNC: 9.4 MG/DL (ref 8.3–10.6)
CHLORIDE BLD-SCNC: 103 MMOL/L (ref 99–110)
CHOLESTEROL, TOTAL: 188 MG/DL (ref 0–199)
CO2: 25 MMOL/L (ref 21–32)
CREAT SERPL-MCNC: 0.7 MG/DL (ref 0.6–1.1)
EOSINOPHILS ABSOLUTE: 0.2 K/UL (ref 0–0.6)
EOSINOPHILS RELATIVE PERCENT: 3.8 %
GFR AFRICAN AMERICAN: >60
GFR NON-AFRICAN AMERICAN: >60
GLOBULIN: 2.7 G/DL
GLUCOSE BLD-MCNC: 87 MG/DL (ref 70–99)
HCT VFR BLD CALC: 44.6 % (ref 36–48)
HDLC SERPL-MCNC: 76 MG/DL (ref 40–60)
HEMOGLOBIN: 15.2 G/DL (ref 12–16)
LDL CHOLESTEROL CALCULATED: 87 MG/DL
LYMPHOCYTES ABSOLUTE: 1.4 K/UL (ref 1–5.1)
LYMPHOCYTES RELATIVE PERCENT: 24.6 %
MCH RBC QN AUTO: 32.7 PG (ref 26–34)
MCHC RBC AUTO-ENTMCNC: 34.1 G/DL (ref 31–36)
MCV RBC AUTO: 95.7 FL (ref 80–100)
MONOCYTES ABSOLUTE: 0.4 K/UL (ref 0–1.3)
MONOCYTES RELATIVE PERCENT: 6.5 %
NEUTROPHILS ABSOLUTE: 3.6 K/UL (ref 1.7–7.7)
NEUTROPHILS RELATIVE PERCENT: 63.9 %
PDW BLD-RTO: 12.4 % (ref 12.4–15.4)
PLATELET # BLD: 255 K/UL (ref 135–450)
PMV BLD AUTO: 10.7 FL (ref 5–10.5)
POTASSIUM SERPL-SCNC: 4.3 MMOL/L (ref 3.5–5.1)
RBC # BLD: 4.67 M/UL (ref 4–5.2)
SODIUM BLD-SCNC: 140 MMOL/L (ref 136–145)
T4 FREE: 1.2 NG/DL (ref 0.9–1.8)
TOTAL PROTEIN: 7.2 G/DL (ref 6.4–8.2)
TRIGL SERPL-MCNC: 127 MG/DL (ref 0–150)
TSH SERPL DL<=0.05 MIU/L-ACNC: 1.13 UIU/ML (ref 0.27–4.2)
VLDLC SERPL CALC-MCNC: 25 MG/DL
WBC # BLD: 5.7 K/UL (ref 4–11)

## 2020-09-28 ENCOUNTER — HOSPITAL ENCOUNTER (OUTPATIENT)
Dept: MAMMOGRAPHY | Age: 40
Discharge: HOME | End: 2020-10-02
Payer: COMMERCIAL

## 2020-09-28 PROCEDURE — 77063 BREAST TOMOSYNTHESIS BI: CPT

## 2020-10-08 ENCOUNTER — NURSE ONLY (OUTPATIENT)
Dept: INTERNAL MEDICINE CLINIC | Age: 40
End: 2020-10-08

## 2020-10-09 ENCOUNTER — E-VISIT (OUTPATIENT)
Dept: FAMILY MEDICINE CLINIC | Age: 40
End: 2020-10-09
Payer: COMMERCIAL

## 2020-10-09 PROCEDURE — 99421 OL DIG E/M SVC 5-10 MIN: CPT | Performed by: FAMILY MEDICINE

## 2020-10-09 RX ORDER — PHENAZOPYRIDINE HYDROCHLORIDE 100 MG/1
100 TABLET, FILM COATED ORAL 3 TIMES DAILY PRN
Qty: 6 TABLET | Refills: 0 | Status: SHIPPED | OUTPATIENT
Start: 2020-10-09 | End: 2020-10-11

## 2020-10-09 RX ORDER — NITROFURANTOIN 25; 75 MG/1; MG/1
100 CAPSULE ORAL 2 TIMES DAILY
Qty: 10 CAPSULE | Refills: 0 | Status: SHIPPED | OUTPATIENT
Start: 2020-10-09 | End: 2020-11-20 | Stop reason: ALTCHOICE

## 2020-10-19 RX ORDER — ALPRAZOLAM 0.5 MG/1
0.5 TABLET ORAL 3 TIMES DAILY PRN
Qty: 15 TABLET | Refills: 1 | Status: SHIPPED | OUTPATIENT
Start: 2020-10-19 | End: 2021-07-21

## 2020-11-09 ENCOUNTER — OFFICE VISIT (OUTPATIENT)
Dept: ORTHOPEDIC SURGERY | Age: 40
End: 2020-11-09
Payer: COMMERCIAL

## 2020-11-09 VITALS — WEIGHT: 150 LBS | HEIGHT: 62 IN | TEMPERATURE: 97.2 F | BODY MASS INDEX: 27.6 KG/M2

## 2020-11-09 PROCEDURE — L3260 AMBULATORY SURGICAL BOOT EAC: HCPCS | Performed by: PHYSICIAN ASSISTANT

## 2020-11-09 PROCEDURE — 99213 OFFICE O/P EST LOW 20 MIN: CPT | Performed by: PHYSICIAN ASSISTANT

## 2020-11-09 RX ORDER — HYDROCODONE BITARTRATE AND ACETAMINOPHEN 5; 325 MG/1; MG/1
1 TABLET ORAL EVERY 6 HOURS PRN
Qty: 28 TABLET | Refills: 0 | Status: SHIPPED | OUTPATIENT
Start: 2020-11-09 | End: 2020-11-16

## 2020-11-09 NOTE — PROGRESS NOTES
esomeprazole (NEXIUM) 20 MG delayed release capsule Take 1 capsule by mouth daily 90 capsule 3    furosemide (LASIX) 20 MG tablet TAKE 1 TABLET BY MOUTH ONE TIME A DAY 90 tablet 1    buPROPion (WELLBUTRIN XL) 300 MG extended release tablet Take 1 tablet by mouth every morning 30 tablet 0    valACYclovir (VALTREX) 500 MG tablet Take 1 tablet by mouth daily 90 tablet 3    albuterol sulfate HFA (PROVENTIL HFA) 108 (90 Base) MCG/ACT inhaler Inhale 1 puff into the lungs every 4 hours as needed for Wheezing or Shortness of Breath 1 Inhaler 5    Phentermine HCl (ADIPEX-P PO) Take by mouth       No current facility-administered medications for this visit. Allergies, social and family histories were reviewed and updated as appropriate. Review of Systems:  Relevant review of systems reviewed and available in the patient's chart under the 'MEDIA' tab. Vital Signs:  Temp 97.2 °F (36.2 °C) (Infrared)   Ht 5' 2\" (1.575 m)   Wt 150 lb (68 kg)   BMI 27.44 kg/m²     General Exam:   Constitutional: Patient is adequately groomed with no evidence of malnutrition  Mental Status: The patient is oriented to time, place and person. The patient's mood and affect are appropriate. Lymphatic: The lymphatic examination bilaterally reveals all areas to be without enlargement or induration. Vascular: Examination reveals no swelling or calf tenderness. Peripheral pulses are palpable and 2+. Neurological: The patient has good coordination. There is no focal weakness or sensory deficit. Right Foot Examination:    Inspection: Normal ankle and foot alignment. Normal muscle contours and no significant limb length discrepancy. No gross atrophy in any particular myotome. There is moderate swelling of the right great toe with ecchymosis. Nail plate is undamaged. There are no abrasions or lacerations to the toe. There is no erythema, induration or warmth to suggest an infectious process.      Palpation: Patient has significant tenderness on palpation of the right great toe. Ankle Range of Motion:   Plantarflexion: 50°   Dorsiflexion: 20°   Inversion: 30°   Eversion: 20°    Skin: There are no rashes, ulcerations or lesions. Sensation to light touch intact    Circulation: The limb is warm and well perfused. Distal pulses intact. Capillary refill is intact. Edema: none. Venous stasis changes: none. Gait: Patient has a antalgic gait and is taking short strides. Radiology:     X-rays obtained and reviewed in office:  Views: 3 view right foot including AP, lateral and oblique  Impression: There is a fracture of the distal phalanx of the right great toe. Orders:  Orders Placed This Encounter   Procedures    XR FOOT RIGHT (MIN 3 VIEWS)     3V Rt Foot Non-WB     Standing Status:   Future     Number of Occurrences:   1     Standing Expiration Date:   11/9/2021     Order Specific Question:   Reason for exam:     Answer: Foot Pain    DJO Post-Op Shoe     Patient was prescribed a DJO Post-Op Shoe. The right foot will require stabilization / immobilization from this semi-rigid / rigid orthosis to improve their function. The orthosis will assist in protecting the affected area, provide functional support and facilitate healing. Patient was instructed to progress ambulation weight bearing as tolerated in the device. The patient was educated and fit by a healthcare professional with expert knowledge and specialization in brace application. Verbal and written instructions for the use of and application of this item were provided. They were instructed to contact the office immediately should the brace result in increased pain, decreased sensation, increased swelling or worsening of the condition. Impression:  Encounter Diagnoses   Name Primary?     Closed displaced fracture of distal phalanx of right great toe, initial encounter Yes    Injury of right great toe, initial encounter        Treatment Plan:          Patient

## 2020-11-10 ENCOUNTER — TELEPHONE (OUTPATIENT)
Dept: ORTHOPEDIC SURGERY | Age: 40
End: 2020-11-10

## 2020-11-10 NOTE — TELEPHONE ENCOUNTER
OTHER:  She says her boot is not working its painful and she doesn't feel stable in it.   She can be reached at [phone]

## 2020-11-10 NOTE — TELEPHONE ENCOUNTER
Spoke to patient and offered to move her appt with Dr Sofia Robertson up to this week but she was unable to due to her work schedule. She did say her mom has a boot and she tried it and it fits and feels much better than the shoe, and she is fine with that and will keep her scheduled appt.

## 2020-11-20 ENCOUNTER — OFFICE VISIT (OUTPATIENT)
Dept: INTERNAL MEDICINE CLINIC | Age: 40
End: 2020-11-20
Payer: COMMERCIAL

## 2020-11-20 VITALS — DIASTOLIC BLOOD PRESSURE: 76 MMHG | HEART RATE: 98 BPM | TEMPERATURE: 99.9 F | SYSTOLIC BLOOD PRESSURE: 140 MMHG

## 2020-11-20 PROCEDURE — 99213 OFFICE O/P EST LOW 20 MIN: CPT | Performed by: NURSE PRACTITIONER

## 2020-11-20 RX ORDER — SULFAMETHOXAZOLE AND TRIMETHOPRIM 800; 160 MG/1; MG/1
1 TABLET ORAL 2 TIMES DAILY
Qty: 14 TABLET | Refills: 0 | Status: SHIPPED | OUTPATIENT
Start: 2020-11-20 | End: 2020-11-27

## 2020-11-24 ENCOUNTER — OFFICE VISIT (OUTPATIENT)
Dept: ORTHOPEDIC SURGERY | Age: 40
End: 2020-11-24
Payer: COMMERCIAL

## 2020-11-24 VITALS — TEMPERATURE: 97.4 F | HEIGHT: 62 IN | WEIGHT: 150 LBS | BODY MASS INDEX: 27.6 KG/M2

## 2020-11-24 PROCEDURE — 99213 OFFICE O/P EST LOW 20 MIN: CPT | Performed by: ORTHOPAEDIC SURGERY

## 2020-11-24 PROCEDURE — 28490 TREAT BIG TOE FRACTURE: CPT | Performed by: ORTHOPAEDIC SURGERY

## 2020-11-24 ASSESSMENT — ENCOUNTER SYMPTOMS: COLOR CHANGE: 1

## 2020-11-24 NOTE — PROGRESS NOTES
SUBJECTIVE:    Patient ID: Donavan Salazar is a 41 y.o. female. CC: Concern for ear infection    HPI: The patient presents to the office for an acute visit. Patient is an employee here and requested to be seen for concerns about possible ear infection. Over the past few days, she has had worsening left ear pain. This is pain primarily located in the external ear and around her piercing. She noticed some redness and swelling. She denies any drainage. She denies any known injury or trauma. She denies any fever. Current Outpatient Medications   Medication Sig Dispense Refill    sulfamethoxazole-trimethoprim (BACTRIM DS) 800-160 MG per tablet Take 1 tablet by mouth 2 times daily for 7 days 14 tablet 0    esomeprazole (NEXIUM) 20 MG delayed release capsule Take 1 capsule by mouth daily 90 capsule 3    furosemide (LASIX) 20 MG tablet TAKE 1 TABLET BY MOUTH ONE TIME A DAY 90 tablet 1    buPROPion (WELLBUTRIN XL) 300 MG extended release tablet Take 1 tablet by mouth every morning 30 tablet 0    valACYclovir (VALTREX) 500 MG tablet Take 1 tablet by mouth daily 90 tablet 3    albuterol sulfate HFA (PROVENTIL HFA) 108 (90 Base) MCG/ACT inhaler Inhale 1 puff into the lungs every 4 hours as needed for Wheezing or Shortness of Breath 1 Inhaler 5    Phentermine HCl (ADIPEX-P PO) Take by mouth       No current facility-administered medications for this visit. Review of Systems   Constitutional: Negative for fever. Skin: Positive for color change and wound. OBJECTIVE:  Physical Exam  Constitutional:       Appearance: Normal appearance. HENT:      Head: Normocephalic and atraumatic. Ears:      Comments: Daith piercing of inner cartilage of ear on left with redness, mild swelling, and tenderness. The ear canal itself is mildly swollen with redness. I appreciate no drainage. The tympanic membrane is normal in appearance. Skin:     General: Skin is warm and dry.    Neurological: General: No focal deficit present. Mental Status: She is alert and oriented to person, place, and time. Psychiatric:         Mood and Affect: Mood normal.         Behavior: Behavior normal.        BP (!) 140/76   Pulse 98   Temp 99.9 °F (37.7 °C) (Temporal)      PHQ Scores 9/14/2020 2/4/2020 7/9/2019   PHQ2 Score 0 6 0   PHQ9 Score 0 15 0     Interpretation of Total Score Depression Severity: 1-4 = Minimal depression, 5-9 = Mild depression, 10-14 = Moderate depression, 15-19 = Moderately severe depression, 20-27 =Severe depression        ASSESSMENT/PLAN:  Sallie Posada was seen today for other. Diagnoses and all orders for this visit:    Ear infection  -     sulfamethoxazole-trimethoprim (BACTRIM DS) 800-160 MG per tablet; Take 1 tablet by mouth 2 times daily for 7 days    - Daith piercing infection of the left exterior ear. Unable to remove piercing  -Wash with soap and water. Take antibiotic as directed and monitor for worsening. If worsening, piercing will need to be removed.         Negra Dhaliwal, APRN - CNP

## 2020-11-25 NOTE — PROGRESS NOTES
CHIEF COMPLAINT: Right foot pain/ first (great toe) distal phalanx minimally displaced fracture. DATE OF INJURY:  2020, DOT 2020    HISTORY:  Ms. Jannette Field 41 y.o.  female presents today for the first visit for evaluation of a right foot injury which occurred when she dropped a crock-pot on her right great toe. She is complaining of right foot first (great toe) pain and swelling. This is better with elevation and worse with bearing any wt. The pain is sharp and not radiating. No other complaint. She was seen 1st at [address] [place] [address], where she was x-rayed and splinted and asked to f/u with me. 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 Past Medical History:   Diagnosis Date    Anxiety     Asthma     Ectopic pregnancy     Endometriosis     Kidney stones     Menorrhagia with irregular cycle        Past Surgical History:   Procedure Laterality Date    APPENDECTOMY      ECTOPIC PREGNANCY SURGERY      ENDOMETRIAL ABLATION      URETER STENT PLACEMENT      WISDOM TOOTH EXTRACTION         Social History     Socioeconomic History    Marital status:      Spouse name: Not on file    Number of children: 2    Years of education: Not on file    Highest education level: Not on file   Occupational History    Occupation: MA   Social Needs    Financial resource strain: Not on file    Food insecurity     Worry: Not on file     Inability: Not on file   INFOGRAPHIQS needs     Medical: Not on file     Non-medical: Not on file   Tobacco Use    Smoking status: Former Smoker     Last attempt to quit: 10/12/2012     Years since quittin.1    Smokeless tobacco: Never Used   Substance and Sexual Activity    Alcohol use:  Yes    Drug use: No    Sexual activity: Yes     Partners: Male   Lifestyle    Physical activity     Days per week: Not on file     Minutes per session: Not on file    Stress: Not on file   Relationships    Social connections     Talks on phone: Not on file     Gets together: Not on file     Attends Worship service: Not on file     Active member of club or organization: Not on file     Attends meetings of clubs or organizations: Not on file     Relationship status: Not on file    Intimate partner violence     Fear of current or ex partner: Not on file     Emotionally abused: Not on file     Physically abused: Not on file     Forced sexual activity: Not on file   Other Topics Concern    Not on file   Social History Narrative    Not on file       Family History   Problem Relation Age of Onset    High Blood Pressure Mother         DDD    Diabetes Maternal Grandmother     Arthritis Maternal Grandmother         DDD    Cancer Maternal Grandmother     High Blood Pressure Maternal Grandmother     Cancer Maternal Grandfather     High Blood Pressure Father     Heart Disease Paternal Grandmother     Cancer Paternal Grandfather     Diabetes Paternal Grandfather     Heart Disease Paternal Grandfather     Stroke Paternal Grandfather        Current Outpatient Medications on File Prior to Visit   Medication Sig Dispense Refill    sulfamethoxazole-trimethoprim (BACTRIM DS) 800-160 MG per tablet Take 1 tablet by mouth 2 times daily for 7 days 14 tablet 0    esomeprazole (NEXIUM) 20 MG delayed release capsule Take 1 capsule by mouth daily 90 capsule 3    furosemide (LASIX) 20 MG tablet TAKE 1 TABLET BY MOUTH ONE TIME A DAY 90 tablet 1    buPROPion (WELLBUTRIN XL) 300 MG extended release tablet Take 1 tablet by mouth every morning 30 tablet 0    valACYclovir (VALTREX) 500 MG tablet Take 1 tablet by mouth daily 90 tablet 3    albuterol sulfate HFA (PROVENTIL HFA) 108 (90 Base) MCG/ACT inhaler Inhale 1 puff into the lungs every 4 hours as needed for Wheezing or Shortness of Breath 1 Inhaler 5    Phentermine HCl (ADIPEX-P PO) Take by mouth       No current facility-administered medications on file prior to visit.         Pertinent items are noted in HPI  Review of systems reviewed from Patient History Form dated on 9/21/2020 and available in the patient's chart under the Media tab. No change noted. PHYSICAL EXAMINATION:  Ms. Jemima Lind is a very pleasant 41 y.o.  female who presents today in no acute distress, awake, alert, and oriented. She is well dressed, nourished and  groomed. Patient with normal affect. Height is  5' 2\" (1.575 m), weight is 150 lb (68 kg), Body mass index is 27.44 kg/m². Resting respiratory rate is 16. Examination of the gait, showed that the patient walks with a limp in post-op shoe, WB right leg. Examination of both feet and ankles showing a decreased range of motion of the right foot first (great toe) compare to the other side because of pain. There is moderate swelling that can be seen, as well as ecchymosis over first (great toe) of the right foot. She has intact sensation and good pedal pulses. She has significant tenderness on deep palpation over the first (great toe) distal phalanx right foot              IMAGING: Xray's were reviewed, dated 11/9/2020, 3 views of the right foot, and showed a first (great toe) distal phalanx minimally displaced fracture. IMPRESSION: Right foot first (great toe) distal phalanx minimally displaced fracture. PLAN: I discussed that the overall alignment of this fracture is good and that we can try to treat this non-operatively in a post-op shoe with full WB and strapping. We discussed the risk of nonunion and  malunion. We will see her  back in 6 weeks at which time we will get a new xray of the right foot. PROCEDURE:    With the patient's permission, the right first and second toes were strapped and tapped with coband. The patient tolerated the procedure well.        Yulissa Frances MD

## 2020-12-05 ENCOUNTER — E-VISIT (OUTPATIENT)
Dept: FAMILY MEDICINE CLINIC | Age: 40
End: 2020-12-05
Payer: COMMERCIAL

## 2020-12-05 PROCEDURE — 99421 OL DIG E/M SVC 5-10 MIN: CPT | Performed by: FAMILY MEDICINE

## 2020-12-05 RX ORDER — SULFAMETHOXAZOLE AND TRIMETHOPRIM 800; 160 MG/1; MG/1
1 TABLET ORAL 2 TIMES DAILY
Qty: 14 TABLET | Refills: 0 | Status: SHIPPED | OUTPATIENT
Start: 2020-12-05 | End: 2020-12-12

## 2020-12-05 NOTE — PROGRESS NOTES
HPI: per patient's questionnaire    EXAM: not applicable    Diagnoses and all orders for this visit:    1. Acute cystitis without hematuria  worsening    - sulfamethoxazole-trimethoprim (BACTRIM DS) 800-160 MG per tablet; Take 1 tablet by mouth 2 times daily for 7 days  Dispense: 14 tablet; Refill: 0      Patient was advised to contact PCP if symptoms worsen or failing to change as expected    5-10 minutes were spent on the digital evaluation and management of this patient.

## 2021-01-29 DIAGNOSIS — R11.2 NON-INTRACTABLE VOMITING WITH NAUSEA, UNSPECIFIED VOMITING TYPE: Primary | ICD-10-CM

## 2021-01-29 RX ORDER — PROMETHAZINE HYDROCHLORIDE 25 MG/1
25 TABLET ORAL 3 TIMES DAILY PRN
Qty: 12 TABLET | Refills: 0 | Status: SHIPPED | OUTPATIENT
Start: 2021-01-29 | End: 2021-02-05

## 2021-02-02 ENCOUNTER — OFFICE VISIT (OUTPATIENT)
Dept: INTERNAL MEDICINE CLINIC | Age: 41
End: 2021-02-02
Payer: COMMERCIAL

## 2021-02-02 VITALS — DIASTOLIC BLOOD PRESSURE: 82 MMHG | HEART RATE: 110 BPM | SYSTOLIC BLOOD PRESSURE: 120 MMHG

## 2021-02-02 DIAGNOSIS — I49.9 IRREGULAR HEARTBEAT: Primary | ICD-10-CM

## 2021-02-02 DIAGNOSIS — R00.0 TACHYCARDIA: ICD-10-CM

## 2021-02-02 PROCEDURE — 99213 OFFICE O/P EST LOW 20 MIN: CPT | Performed by: INTERNAL MEDICINE

## 2021-02-02 PROCEDURE — 93000 ELECTROCARDIOGRAM COMPLETE: CPT | Performed by: INTERNAL MEDICINE

## 2021-02-04 PROBLEM — R00.0 TACHYCARDIA: Status: ACTIVE | Noted: 2021-02-04

## 2021-02-04 PROBLEM — I49.9 IRREGULAR HEARTBEAT: Status: ACTIVE | Noted: 2021-02-04

## 2021-02-04 ASSESSMENT — ENCOUNTER SYMPTOMS
CHEST TIGHTNESS: 0
SHORTNESS OF BREATH: 0

## 2021-02-04 NOTE — PROGRESS NOTES
Patient: Moon Ybarra is a 41 y.o. female who presents today with the following Chief Complaint(s):  No chief complaint on file. HPI     Patient is seen acutely today due to rapid heartbeat and palpitations. No associated lightheadedness, shortness of breath, or dizziness. Patient states that her heart rate is typically in the 90s. Is symptomatic and that the palpitations are noticeable and she feels when her heart rate speeds up and races. States that it has been doing this off and on all day. No increase in caffeine intake. No decongestant use. No Known Allergies   Past Medical History:   Diagnosis Date    Anxiety     Asthma     Ectopic pregnancy     Endometriosis     Kidney stones     Menorrhagia with irregular cycle       Past Surgical History:   Procedure Laterality Date    APPENDECTOMY      ECTOPIC PREGNANCY SURGERY      ENDOMETRIAL ABLATION      URETER STENT PLACEMENT      WISDOM TOOTH EXTRACTION        Social History     Socioeconomic History    Marital status:      Spouse name: Not on file    Number of children: 2    Years of education: Not on file    Highest education level: Not on file   Occupational History    Occupation: MA   Social Needs    Financial resource strain: Not on file    Food insecurity     Worry: Not on file     Inability: Not on file   MC10 needs     Medical: Not on file     Non-medical: Not on file   Tobacco Use    Smoking status: Former Smoker     Quit date: 10/12/2012     Years since quittin.3    Smokeless tobacco: Never Used   Substance and Sexual Activity    Alcohol use:  Yes    Drug use: No    Sexual activity: Yes     Partners: Male   Lifestyle    Physical activity     Days per week: Not on file     Minutes per session: Not on file    Stress: Not on file   Relationships    Social connections     Talks on phone: Not on file     Gets together: Not on file     Attends Yazidi service: Not on file Active member of club or organization: Not on file     Attends meetings of clubs or organizations: Not on file     Relationship status: Not on file    Intimate partner violence     Fear of current or ex partner: Not on file     Emotionally abused: Not on file     Physically abused: Not on file     Forced sexual activity: Not on file   Other Topics Concern    Not on file   Social History Narrative    Not on file     Family History   Problem Relation Age of Onset    High Blood Pressure Mother         DDD    Diabetes Maternal Grandmother     Arthritis Maternal Grandmother         DDD    Cancer Maternal Grandmother     High Blood Pressure Maternal Grandmother     Cancer Maternal Grandfather     High Blood Pressure Father     Heart Disease Paternal Grandmother     Cancer Paternal Grandfather     Diabetes Paternal Grandfather     Heart Disease Paternal Grandfather     Stroke Paternal Grandfather         Outpatient Medications Prior to Visit   Medication Sig Dispense Refill    promethazine (PHENERGAN) 25 MG tablet Take 1 tablet by mouth 3 times daily as needed for Nausea 12 tablet 0    valACYclovir (VALTREX) 500 MG tablet TAKE 1 TABLET BY MOUTH ONE TIME A DAY 90 tablet 1    furosemide (LASIX) 20 MG tablet TAKE 1 TABLET BY MOUTH ONE TIME A DAY 90 tablet 1    esomeprazole (NEXIUM) 20 MG delayed release capsule Take 1 capsule by mouth daily 90 capsule 3    buPROPion (WELLBUTRIN XL) 300 MG extended release tablet Take 1 tablet by mouth every morning 30 tablet 0    albuterol sulfate HFA (PROVENTIL HFA) 108 (90 Base) MCG/ACT inhaler Inhale 1 puff into the lungs every 4 hours as needed for Wheezing or Shortness of Breath 1 Inhaler 5    Phentermine HCl (ADIPEX-P PO) Take by mouth       No facility-administered medications prior to visit. Patient'spast medical history, surgical history, family history, medications,  and allergies  were all reviewed and updated as appropriate today. Review of Systems   Constitutional: Negative for fatigue and fever. Respiratory: Negative for chest tightness and shortness of breath. Cardiovascular: Positive for palpitations. Negative for chest pain and leg swelling. Neurological: Negative for dizziness and light-headedness. /82   Pulse 110   Physical Exam  Constitutional:       Appearance: Normal appearance. She is normal weight. Cardiovascular:      Rate and Rhythm: Regular rhythm. Tachycardia present. Heart sounds: Normal heart sounds. Pulmonary:      Effort: Pulmonary effort is normal. No respiratory distress. Breath sounds: Normal breath sounds. No wheezing. Neurological:      General: No focal deficit present. Mental Status: She is alert and oriented to person, place, and time. Psychiatric:         Mood and Affect: Mood normal.         Behavior: Behavior normal.         ASSESSMENT/PLAN:    Problem List Items Addressed This Visit     Irregular heartbeat - Primary     EKG was sinus tachycardia. No irregularity. Patient has been appointment to see her normal primary care physician, Dr. Mary Garcia, on Friday. No additional work-up has been initiated at this time. Relevant Orders    EKG 12 Lead (Completed)    Tachycardia     EKG was sinus tachycardia. No irregularity. Patient has been appointment to see her normal primary care physician, Dr. Mary Garcia, on Friday. No additional work-up has been initiated at this time.                Current Outpatient Medications   Medication Sig Dispense Refill    promethazine (PHENERGAN) 25 MG tablet Take 1 tablet by mouth 3 times daily as needed for Nausea 12 tablet 0    valACYclovir (VALTREX) 500 MG tablet TAKE 1 TABLET BY MOUTH ONE TIME A DAY 90 tablet 1    furosemide (LASIX) 20 MG tablet TAKE 1 TABLET BY MOUTH ONE TIME A DAY 90 tablet 1    esomeprazole (NEXIUM) 20 MG delayed release capsule Take 1 capsule by mouth daily 90 capsule 3  buPROPion (WELLBUTRIN XL) 300 MG extended release tablet Take 1 tablet by mouth every morning 30 tablet 0    albuterol sulfate HFA (PROVENTIL HFA) 108 (90 Base) MCG/ACT inhaler Inhale 1 puff into the lungs every 4 hours as needed for Wheezing or Shortness of Breath 1 Inhaler 5    Phentermine HCl (ADIPEX-P PO) Take by mouth       No current facility-administered medications for this visit. No follow-ups on file.

## 2021-02-04 NOTE — ASSESSMENT & PLAN NOTE
EKG was sinus tachycardia. No irregularity. Patient has been appointment to see her normal primary care physician, Dr. Trudi Blizzard, on Friday. No additional work-up has been initiated at this time.

## 2021-02-19 ENCOUNTER — OFFICE VISIT (OUTPATIENT)
Dept: INTERNAL MEDICINE CLINIC | Age: 41
End: 2021-02-19
Payer: COMMERCIAL

## 2021-02-19 VITALS
OXYGEN SATURATION: 99 % | SYSTOLIC BLOOD PRESSURE: 136 MMHG | HEART RATE: 99 BPM | TEMPERATURE: 97 F | DIASTOLIC BLOOD PRESSURE: 74 MMHG

## 2021-02-19 DIAGNOSIS — R00.0 TACHYCARDIA: Primary | ICD-10-CM

## 2021-02-19 DIAGNOSIS — R63.5 WEIGHT GAIN: ICD-10-CM

## 2021-02-19 PROCEDURE — 99213 OFFICE O/P EST LOW 20 MIN: CPT | Performed by: INTERNAL MEDICINE

## 2021-02-19 RX ORDER — ORAL SEMAGLUTIDE 3 MG/1
TABLET ORAL
Qty: 30 TABLET | Refills: 0 | COMMUNITY
Start: 2021-02-19 | End: 2021-06-21 | Stop reason: SDUPTHER

## 2021-02-19 ASSESSMENT — ENCOUNTER SYMPTOMS: SHORTNESS OF BREATH: 1

## 2021-02-19 ASSESSMENT — PATIENT HEALTH QUESTIONNAIRE - PHQ9
SUM OF ALL RESPONSES TO PHQ QUESTIONS 1-9: 1
1. LITTLE INTEREST OR PLEASURE IN DOING THINGS: 0

## 2021-02-19 NOTE — PROGRESS NOTES
Recurrent heart palpitation with emotional distress and weight gain. Chief Complaint   Patient presents with    Palpitations    Weight Gain       Vitals:    02/19/21 1550   BP: 136/74   Pulse: 99   Temp: 97 °F (36.1 °C)   SpO2: 99%     PHQ Scores 2/19/2021 9/14/2020 2/4/2020 7/9/2019   PHQ2 Score 1 0 6 0   PHQ9 Score 1 0 15 0     Interpretation of Total Score Depression Severity: 1-4 = Minimal depression, 5-9 = Mild depression, 10-14 = Moderate depression, 15-19 = Moderately severe depression, 20-27 = Severe depression    Review of Systems   Constitutional: Positive for unexpected weight change. Respiratory: Positive for shortness of breath. Cardiovascular: Positive for palpitations. Negative for chest pain. Physical Exam  Vitals signs reviewed. Constitutional:       General: She is not in acute distress. Appearance: She is well-developed. She is not diaphoretic. HENT:      Head: Normocephalic and atraumatic. Neck:      Musculoskeletal: Normal range of motion and neck supple. No neck rigidity or muscular tenderness. Vascular: No carotid bruit. Cardiovascular:      Rate and Rhythm: Regular rhythm. Tachycardia present. Pulses: Normal pulses. Heart sounds: Normal heart sounds. No murmur. No friction rub. No gallop. Pulmonary:      Effort: Pulmonary effort is normal. No respiratory distress. Breath sounds: Normal breath sounds. No stridor. No wheezing, rhonchi or rales. Chest:      Chest wall: No tenderness. Lymphadenopathy:      Cervical: No cervical adenopathy. Neurological:      Mental Status: She is alert and oriented to person, place, and time. Cranial Nerves: No cranial nerve deficit. Psychiatric:         Behavior: Behavior normal.         Thought Content: Thought content normal.         Judgment: Judgment normal.       Assessment/Plan:  Antoine Angel was seen today for palpitations and weight gain.     Diagnoses and all orders for this visit:    Tachycardia  -     Cardiac event monitor; Future  -     T4, Free; Future  -     TSH without Reflex; Future  -     CBC Auto Differential; Future  -     Basic Metabolic Panel, Fasting; Future  -     ECHO Complete 2D W Doppler W Color; Future    Weight gain  -     T4, Free; Future  -     TSH without Reflex;  Future  -     Semaglutide (RYBELSUS) 3 MG TABS; Take 1 tablet daily        Niko Gonzalez MD  [address]

## 2021-02-22 ENCOUNTER — NURSE ONLY (OUTPATIENT)
Dept: CARDIOLOGY CLINIC | Age: 41
End: 2021-02-22
Payer: COMMERCIAL

## 2021-02-22 DIAGNOSIS — R00.0 TACHYCARDIA: ICD-10-CM

## 2021-02-22 DIAGNOSIS — R63.5 WEIGHT GAIN: ICD-10-CM

## 2021-02-22 DIAGNOSIS — R00.2 PALPITATIONS: Primary | ICD-10-CM

## 2021-02-22 LAB
ANION GAP SERPL CALCULATED.3IONS-SCNC: 10 MMOL/L (ref 3–16)
BASOPHILS ABSOLUTE: 0.1 K/UL (ref 0–0.2)
BASOPHILS RELATIVE PERCENT: 1.1 %
BUN BLDV-MCNC: 13 MG/DL (ref 7–20)
CALCIUM SERPL-MCNC: 9.3 MG/DL (ref 8.3–10.6)
CHLORIDE BLD-SCNC: 98 MMOL/L (ref 99–110)
CO2: 26 MMOL/L (ref 21–32)
CREAT SERPL-MCNC: 0.6 MG/DL (ref 0.6–1.1)
EOSINOPHILS ABSOLUTE: 0.2 K/UL (ref 0–0.6)
EOSINOPHILS RELATIVE PERCENT: 3 %
GFR AFRICAN AMERICAN: >60
GFR NON-AFRICAN AMERICAN: >60
GLUCOSE FASTING: 77 MG/DL (ref 70–99)
HCT VFR BLD CALC: 42.4 % (ref 36–48)
HEMOGLOBIN: 14.6 G/DL (ref 12–16)
LYMPHOCYTES ABSOLUTE: 1.7 K/UL (ref 1–5.1)
LYMPHOCYTES RELATIVE PERCENT: 25.4 %
MCH RBC QN AUTO: 32.1 PG (ref 26–34)
MCHC RBC AUTO-ENTMCNC: 34.4 G/DL (ref 31–36)
MCV RBC AUTO: 93.3 FL (ref 80–100)
MONOCYTES ABSOLUTE: 0.5 K/UL (ref 0–1.3)
MONOCYTES RELATIVE PERCENT: 7.2 %
NEUTROPHILS ABSOLUTE: 4.3 K/UL (ref 1.7–7.7)
NEUTROPHILS RELATIVE PERCENT: 63.3 %
PDW BLD-RTO: 12.6 % (ref 12.4–15.4)
PLATELET # BLD: 280 K/UL (ref 135–450)
PMV BLD AUTO: 10.2 FL (ref 5–10.5)
POTASSIUM SERPL-SCNC: 4.3 MMOL/L (ref 3.5–5.1)
RBC # BLD: 4.54 M/UL (ref 4–5.2)
SODIUM BLD-SCNC: 134 MMOL/L (ref 136–145)
T4 FREE: 1.1 NG/DL (ref 0.9–1.8)
TSH SERPL DL<=0.05 MIU/L-ACNC: 1.09 UIU/ML (ref 0.27–4.2)
WBC # BLD: 6.8 K/UL (ref 4–11)

## 2021-02-24 PROCEDURE — 93228 REMOTE 30 DAY ECG REV/REPORT: CPT | Performed by: INTERNAL MEDICINE

## 2021-03-18 DIAGNOSIS — R63.5 WEIGHT GAIN: ICD-10-CM

## 2021-03-18 DIAGNOSIS — E88.81 METABOLIC SYNDROME: Primary | ICD-10-CM

## 2021-03-18 RX ORDER — ORAL SEMAGLUTIDE 7 MG/1
1 TABLET ORAL DAILY
Qty: 30 TABLET | Refills: 3 | Status: SHIPPED | OUTPATIENT
Start: 2021-03-18 | End: 2021-04-20 | Stop reason: SDUPTHER

## 2021-04-20 ENCOUNTER — PATIENT MESSAGE (OUTPATIENT)
Dept: INTERNAL MEDICINE CLINIC | Age: 41
End: 2021-04-20

## 2021-04-20 DIAGNOSIS — R63.5 WEIGHT GAIN: ICD-10-CM

## 2021-04-20 DIAGNOSIS — E88.81 METABOLIC SYNDROME: ICD-10-CM

## 2021-04-20 RX ORDER — ORAL SEMAGLUTIDE 7 MG/1
1 TABLET ORAL DAILY
Qty: 90 TABLET | Refills: 1 | Status: SHIPPED | OUTPATIENT
Start: 2021-04-20 | End: 2021-11-01

## 2021-04-20 NOTE — TELEPHONE ENCOUNTER
From: Lawrence Ayala  To: Sowmya Collins MD  Sent: 4/20/2021 9:27 AM EDT  Subject: Non-Urgent Medical Question    Hi ! I've lost 10 pounds! I'm so happy about that. However [facility] will not refill my script because I have to use [facility]. I'm out of meds as of tomorrow and they just told me this today. [facility] [place]. Can you send to [facility]. Any chance you have samples? We leave for [place] this weekend and is like to have some. Thanks so much.  And thanks for helping me kickstart this weight loss

## 2021-05-17 DIAGNOSIS — F41.9 ANXIETY: ICD-10-CM

## 2021-05-17 DIAGNOSIS — F33.1 MODERATE EPISODE OF RECURRENT MAJOR DEPRESSIVE DISORDER (HCC): ICD-10-CM

## 2021-05-18 RX ORDER — BUPROPION HYDROCHLORIDE 300 MG/1
300 TABLET ORAL EVERY MORNING
Qty: 30 TABLET | Refills: 0 | Status: SHIPPED | OUTPATIENT
Start: 2021-05-18 | End: 2021-06-22 | Stop reason: SDUPTHER

## 2021-05-31 RX ORDER — FLUCONAZOLE 100 MG/1
100 TABLET ORAL DAILY
Qty: 7 TABLET | Refills: 0 | Status: SHIPPED | OUTPATIENT
Start: 2021-05-31 | End: 2021-06-07

## 2021-06-21 ENCOUNTER — OFFICE VISIT (OUTPATIENT)
Dept: INTERNAL MEDICINE CLINIC | Age: 41
End: 2021-06-21
Payer: COMMERCIAL

## 2021-06-21 VITALS
DIASTOLIC BLOOD PRESSURE: 60 MMHG | BODY MASS INDEX: 29.26 KG/M2 | SYSTOLIC BLOOD PRESSURE: 120 MMHG | HEART RATE: 88 BPM | WEIGHT: 160 LBS | TEMPERATURE: 97 F | OXYGEN SATURATION: 97 %

## 2021-06-21 DIAGNOSIS — N39.0 URINARY TRACT INFECTION WITHOUT HEMATURIA, SITE UNSPECIFIED: Primary | ICD-10-CM

## 2021-06-21 LAB
BILIRUBIN, POC: ABNORMAL
BLOOD URINE, POC: ABNORMAL
CLARITY, POC: ABNORMAL
COLOR, POC: ABNORMAL
GLUCOSE URINE, POC: 100
KETONES, POC: ABNORMAL
LEUKOCYTE EST, POC: ABNORMAL
NITRITE, POC: POSITIVE
PH, POC: 5
PROTEIN, POC: 30
SPECIFIC GRAVITY, POC: 1.01
UROBILINOGEN, POC: 2

## 2021-06-21 PROCEDURE — 81002 URINALYSIS NONAUTO W/O SCOPE: CPT | Performed by: NURSE PRACTITIONER

## 2021-06-21 PROCEDURE — 99213 OFFICE O/P EST LOW 20 MIN: CPT | Performed by: NURSE PRACTITIONER

## 2021-06-21 RX ORDER — CIPROFLOXACIN 500 MG/1
500 TABLET, FILM COATED ORAL 2 TIMES DAILY
Qty: 10 TABLET | Refills: 0 | Status: SHIPPED | OUTPATIENT
Start: 2021-06-21 | End: 2021-06-26

## 2021-06-21 ASSESSMENT — ENCOUNTER SYMPTOMS
RESPIRATORY NEGATIVE: 1
GASTROINTESTINAL NEGATIVE: 1

## 2021-06-21 NOTE — PROGRESS NOTES
SUBJECTIVE:    Patient ID: Hussein Can is a 35 y.o. female. CC: Poor appetite, chills, sweating, urinary symptoms    HPI: The patient presents to the office for an acute visit. Over the weekend, the patient developed poor appetite, chills, sweating. She denies any respiratory symptoms. She denies any GI symptoms. This morning, she developed urinary symptoms including cloudy, malodorous urine with urinary frequency and urgency. She took Azo at home. Current Outpatient Medications   Medication Sig Dispense Refill    furosemide (LASIX) 20 MG tablet TAKE 1 TABLET BY MOUTH ONE TIME A DAY 90 tablet 1    valACYclovir (VALTREX) 500 MG tablet TAKE 1 TABLET BY MOUTH ONE TIME A DAY 90 tablet 1    buPROPion (WELLBUTRIN XL) 300 MG extended release tablet Take 1 tablet by mouth every morning 30 tablet 0    Semaglutide (RYBELSUS) 7 MG TABS Take 1 tablet by mouth daily 90 tablet 1    esomeprazole (NEXIUM) 20 MG delayed release capsule Take 1 capsule by mouth daily 90 capsule 3    albuterol sulfate HFA (PROVENTIL HFA) 108 (90 Base) MCG/ACT inhaler Inhale 1 puff into the lungs every 4 hours as needed for Wheezing or Shortness of Breath 1 Inhaler 5     No current facility-administered medications for this visit. Review of Systems   Constitutional: Positive for appetite change, chills and diaphoresis. Respiratory: Negative. Cardiovascular: Negative. Gastrointestinal: Negative. Genitourinary: Positive for dysuria, frequency and urgency. Negative for vaginal discharge. OBJECTIVE:  Physical Exam  Constitutional:       General: She is not in acute distress. Appearance: Normal appearance. She is not ill-appearing. HENT:      Head: Normocephalic and atraumatic. Skin:     General: Skin is warm. Neurological:      General: No focal deficit present. Mental Status: She is alert and oriented to person, place, and time.    Psychiatric:         Mood and Affect: Mood normal. Behavior: Behavior normal.        /60   Pulse 88   Temp 97 °F (36.1 °C)   Wt 160 lb (72.6 kg)   SpO2 97%   BMI 29.26 kg/m²      PHQ Scores 2/19/2021 9/14/2020 2/4/2020 7/9/2019   PHQ2 Score 1 0 6 0   PHQ9 Score 1 0 15 0     Interpretation of Total Score Depression Severity: 1-4 = Minimal depression, 5-9 = Mild depression, 10-14 = Moderate depression, 15-19 = Moderately severe depression, 20-27 =Severe depression      ASSESSMENT/PLAN:  Nancy De La Torre was seen today for other. Diagnoses and all orders for this visit:    Urinary tract infection without hematuria, site unspecified  -     POCT Urinalysis no Micro  -     ciprofloxacin (CIPRO) 500 MG tablet; Take 1 tablet by mouth 2 times daily for 5 days    -Feeling poorly over the weekend with poor appetite, chills, diaphoreses. Developed urinary symptoms this morning. Urinalysis positive for nitrates and blood. Presumptive urinary tract infection. Will treat with Cipro. If symptoms not improving, culture urine and obtain labs.       Nonda Ply, APRN - CNP

## 2021-06-22 ENCOUNTER — TELEPHONE (OUTPATIENT)
Dept: INTERNAL MEDICINE CLINIC | Age: 41
End: 2021-06-22

## 2021-06-22 DIAGNOSIS — F33.1 MODERATE EPISODE OF RECURRENT MAJOR DEPRESSIVE DISORDER (HCC): ICD-10-CM

## 2021-06-22 DIAGNOSIS — F41.9 ANXIETY: ICD-10-CM

## 2021-06-22 DIAGNOSIS — R11.0 NAUSEA: Primary | ICD-10-CM

## 2021-06-22 RX ORDER — ONDANSETRON 4 MG/1
4 TABLET, FILM COATED ORAL EVERY 12 HOURS PRN
Qty: 15 TABLET | Refills: 0 | Status: SHIPPED | OUTPATIENT
Start: 2021-06-22 | End: 2021-10-27

## 2021-06-23 RX ORDER — BUPROPION HYDROCHLORIDE 300 MG/1
300 TABLET ORAL EVERY MORNING
Qty: 30 TABLET | Refills: 0 | Status: SHIPPED | OUTPATIENT
Start: 2021-06-23 | End: 2021-08-02

## 2021-07-21 DIAGNOSIS — F41.9 ANXIETY: ICD-10-CM

## 2021-07-21 RX ORDER — ALPRAZOLAM 0.5 MG/1
0.5 TABLET ORAL 3 TIMES DAILY PRN
Qty: 15 TABLET | Refills: 0 | Status: SHIPPED | OUTPATIENT
Start: 2021-07-21 | End: 2021-11-11

## 2021-07-21 RX ORDER — ALPRAZOLAM 0.5 MG/1
0.5 TABLET ORAL 3 TIMES DAILY PRN
Qty: 15 TABLET | Refills: 0 | Status: SHIPPED | OUTPATIENT
Start: 2021-07-21 | End: 2021-07-21 | Stop reason: SDUPTHER

## 2021-08-02 DIAGNOSIS — F41.9 ANXIETY: ICD-10-CM

## 2021-08-02 DIAGNOSIS — F33.1 MODERATE EPISODE OF RECURRENT MAJOR DEPRESSIVE DISORDER (HCC): ICD-10-CM

## 2021-08-02 RX ORDER — BUPROPION HYDROCHLORIDE 300 MG/1
TABLET ORAL
Qty: 30 TABLET | Refills: 0 | Status: SHIPPED | OUTPATIENT
Start: 2021-08-02 | End: 2021-08-31 | Stop reason: SDUPTHER

## 2021-08-31 DIAGNOSIS — F33.1 MODERATE EPISODE OF RECURRENT MAJOR DEPRESSIVE DISORDER (HCC): ICD-10-CM

## 2021-08-31 DIAGNOSIS — F41.9 ANXIETY: ICD-10-CM

## 2021-08-31 RX ORDER — BUPROPION HYDROCHLORIDE 300 MG/1
300 TABLET ORAL EVERY MORNING
Qty: 90 TABLET | Refills: 3 | Status: SHIPPED | OUTPATIENT
Start: 2021-08-31 | End: 2021-10-27

## 2021-09-16 LAB
CHOLESTEROL, TOTAL: 181 MG/DL (ref 0–199)
GLUCOSE BLD-MCNC: 85 MG/DL (ref 70–99)
HDLC SERPL-MCNC: 53 MG/DL (ref 40–60)
LDL CHOLESTEROL CALCULATED: 102 MG/DL
TRIGL SERPL-MCNC: 132 MG/DL (ref 0–150)

## 2021-09-29 ENCOUNTER — HOSPITAL ENCOUNTER (OUTPATIENT)
Dept: MAMMOGRAPHY | Age: 41
Discharge: HOME | End: 2021-10-03
Payer: COMMERCIAL

## 2021-09-29 VITALS — BODY MASS INDEX: 29.44 KG/M2 | WEIGHT: 160 LBS | HEIGHT: 62 IN

## 2021-09-29 DIAGNOSIS — Z12.31 VISIT FOR SCREENING MAMMOGRAM: ICD-10-CM

## 2021-09-29 PROCEDURE — 77063 BREAST TOMOSYNTHESIS BI: CPT

## 2021-10-07 ENCOUNTER — E-VISIT (OUTPATIENT)
Dept: INTERNAL MEDICINE CLINIC | Age: 41
End: 2021-10-07
Payer: COMMERCIAL

## 2021-10-07 DIAGNOSIS — G43.011 INTRACTABLE MIGRAINE WITHOUT AURA AND WITH STATUS MIGRAINOSUS: Primary | ICD-10-CM

## 2021-10-07 PROCEDURE — 99422 OL DIG E/M SVC 11-20 MIN: CPT | Performed by: INTERNAL MEDICINE

## 2021-10-07 RX ORDER — ONDANSETRON 4 MG/1
4 TABLET, FILM COATED ORAL 3 TIMES DAILY PRN
Qty: 15 TABLET | Refills: 0 | Status: SHIPPED | OUTPATIENT
Start: 2021-10-07 | End: 2021-10-27 | Stop reason: ALTCHOICE

## 2021-10-07 RX ORDER — RIZATRIPTAN BENZOATE 10 MG/1
TABLET ORAL
Qty: 9 TABLET | Refills: 0 | Status: SHIPPED | OUTPATIENT
Start: 2021-10-07 | End: 2022-05-07 | Stop reason: SDUPTHER

## 2021-10-07 NOTE — PROGRESS NOTES
11-20 minutes were spent on the digital evaluation and management of this patient. Diagnoses and associated orders for this visit:     Intractable migraine without aura and with status migrainosus  rizatriptan (MAXALT) 10 MG tablet; 1 at onset May repeat in 2 hours x2 within 24 hrs if needed for HA  ondansetron (ZOFRAN) 4 MG tablet; Take 1 tablet by mouth 3 times daily as needed for Nausea or Vomiting    I have sent your medication to the pharmacy on file. If not better, please schedule for an in person or a virtual video visit instead of an Evisit so you can be examined and interact with your provider to better diagnose and treat your symptoms.

## 2021-10-27 ENCOUNTER — OFFICE VISIT (OUTPATIENT)
Dept: INTERNAL MEDICINE CLINIC | Age: 41
End: 2021-10-27
Payer: COMMERCIAL

## 2021-10-27 VITALS
SYSTOLIC BLOOD PRESSURE: 126 MMHG | HEART RATE: 83 BPM | TEMPERATURE: 96.6 F | BODY MASS INDEX: 29.45 KG/M2 | DIASTOLIC BLOOD PRESSURE: 82 MMHG | WEIGHT: 161 LBS | OXYGEN SATURATION: 98 %

## 2021-10-27 DIAGNOSIS — F33.1 MODERATE EPISODE OF RECURRENT MAJOR DEPRESSIVE DISORDER (HCC): ICD-10-CM

## 2021-10-27 DIAGNOSIS — F41.9 ANXIETY: ICD-10-CM

## 2021-10-27 PROCEDURE — 99213 OFFICE O/P EST LOW 20 MIN: CPT | Performed by: INTERNAL MEDICINE

## 2021-10-27 RX ORDER — BUPROPION HYDROCHLORIDE 450 MG/1
450 TABLET, FILM COATED, EXTENDED RELEASE ORAL EVERY MORNING
Qty: 90 TABLET | Refills: 1 | Status: SHIPPED | OUTPATIENT
Start: 2021-10-27 | End: 2021-11-02

## 2021-10-27 SDOH — ECONOMIC STABILITY: FOOD INSECURITY: WITHIN THE PAST 12 MONTHS, THE FOOD YOU BOUGHT JUST DIDN'T LAST AND YOU DIDN'T HAVE MONEY TO GET MORE.: NEVER TRUE

## 2021-10-27 SDOH — ECONOMIC STABILITY: FOOD INSECURITY: WITHIN THE PAST 12 MONTHS, YOU WORRIED THAT YOUR FOOD WOULD RUN OUT BEFORE YOU GOT MONEY TO BUY MORE.: NEVER TRUE

## 2021-10-27 ASSESSMENT — SOCIAL DETERMINANTS OF HEALTH (SDOH): HOW HARD IS IT FOR YOU TO PAY FOR THE VERY BASICS LIKE FOOD, HOUSING, MEDICAL CARE, AND HEATING?: NOT HARD AT ALL

## 2021-10-27 ASSESSMENT — PATIENT HEALTH QUESTIONNAIRE - PHQ9
SUM OF ALL RESPONSES TO PHQ QUESTIONS 1-9: 1
1. LITTLE INTEREST OR PLEASURE IN DOING THINGS: 0
SUM OF ALL RESPONSES TO PHQ QUESTIONS 1-9: 1
SUM OF ALL RESPONSES TO PHQ QUESTIONS 1-9: 1
2. FEELING DOWN, DEPRESSED OR HOPELESS: 1
SUM OF ALL RESPONSES TO PHQ9 QUESTIONS 1 & 2: 1

## 2021-10-27 NOTE — PROGRESS NOTES
Chief Complaint   Patient presents with    Depression       Assessment/Plan:  Amanda Burgos was seen today for depression. Diagnoses and all orders for this visit:    Moderate episode of recurrent major depressive disorder (HCC)  -     buPROPion 450 MG TB24; Take 450 mg by mouth every morning    Anxiety  -     buPROPion 450 MG TB24; Take 450 mg by mouth every morning      Vitals:    10/27/21 0844   BP: 126/82   Pulse: 83   Temp: 96.6 °F (35.9 °C)   SpO2: 98%       Physical Exam  Vitals reviewed. Constitutional:       General: She is not in acute distress. Appearance: She is well-developed. She is not diaphoretic. HENT:      Head: Normocephalic and atraumatic. Pulmonary:      Effort: Pulmonary effort is normal.   Neurological:      Mental Status: She is alert and oriented to person, place, and time. Cranial Nerves: No cranial nerve deficit. Psychiatric:         Behavior: Behavior normal.         Thought Content:  Thought content normal.         Judgment: Judgment normal.         PHQ Scores 10/27/2021 2/19/2021 9/14/2020 2/4/2020 7/9/2019   PHQ2 Score 1 1 0 6 0   PHQ9 Score 1 1 0 15 0     Interpretation of Total Score Depression Severity: 1-4 = Minimal depression, 5-9 = Mild depression, 10-14 = Moderate depression, 15-19 = Moderately severe depression, 20-27 = Severe depression    Blair Frias MD  [address]

## 2021-11-01 ENCOUNTER — TELEPHONE (OUTPATIENT)
Dept: ADMINISTRATIVE | Age: 41
End: 2021-11-01

## 2021-11-01 DIAGNOSIS — F41.9 ANXIETY: ICD-10-CM

## 2021-11-01 DIAGNOSIS — F33.1 MODERATE EPISODE OF RECURRENT MAJOR DEPRESSIVE DISORDER (HCC): ICD-10-CM

## 2021-11-02 RX ORDER — BUPROPION HYDROCHLORIDE 150 MG/1
TABLET ORAL
Qty: 270 TABLET | Refills: 1 | Status: SHIPPED | OUTPATIENT
Start: 2021-11-02 | End: 2022-08-12

## 2021-11-02 NOTE — TELEPHONE ENCOUNTER
Bupropion XL 450MG is not formulary. The plan is asking if Dr. Rhina Fenton would change the strength of Bupropion to XL 150MG 3 times daily? Please advise. Thank you.

## 2021-11-04 NOTE — TELEPHONE ENCOUNTER
[facility] did not receive change of dosage for BUPROPION. Verbal change from 450mg QD to 150mg 3/day . * see previous message from Dr Ashraf.

## 2021-11-05 ENCOUNTER — TELEPHONE (OUTPATIENT)
Dept: ADMINISTRATIVE | Age: 41
End: 2021-11-05

## 2021-11-05 NOTE — TELEPHONE ENCOUNTER
Submitted PA for buPROPion HCl ER (XL) 150MG er tablets  Via CMM Key: [id number] STATUS: APPROVED. In the next paragraph the Bupropion 150MG is approved in the same letter. Burpropion HCL 450MG was denied. LEtter attached. Please notify patient. Thank you.

## 2021-11-10 DIAGNOSIS — F41.9 ANXIETY: ICD-10-CM

## 2021-11-11 RX ORDER — ALPRAZOLAM 0.5 MG/1
TABLET ORAL
Qty: 15 TABLET | Refills: 0 | Status: SHIPPED | OUTPATIENT
Start: 2021-11-11 | End: 2022-05-25

## 2022-04-20 DIAGNOSIS — H66.009 NON-RECURRENT ACUTE SUPPURATIVE OTITIS MEDIA WITHOUT SPONTANEOUS RUPTURE OF TYMPANIC MEMBRANE, UNSPECIFIED LATERALITY: Primary | ICD-10-CM

## 2022-04-20 RX ORDER — AMOXICILLIN AND CLAVULANATE POTASSIUM 875; 125 MG/1; MG/1
1 TABLET, FILM COATED ORAL 2 TIMES DAILY
Qty: 20 TABLET | Refills: 0 | Status: SHIPPED | OUTPATIENT
Start: 2022-04-20 | End: 2022-04-30

## 2022-04-29 ENCOUNTER — TELEPHONE (OUTPATIENT)
Dept: INTERNAL MEDICINE CLINIC | Age: 42
End: 2022-04-29

## 2022-04-29 DIAGNOSIS — H66.009 NON-RECURRENT ACUTE SUPPURATIVE OTITIS MEDIA WITHOUT SPONTANEOUS RUPTURE OF TYMPANIC MEMBRANE, UNSPECIFIED LATERALITY: ICD-10-CM

## 2022-04-29 RX ORDER — FLUCONAZOLE 100 MG/1
100 TABLET ORAL DAILY
Qty: 7 TABLET | Refills: 0 | Status: SHIPPED | OUTPATIENT
Start: 2022-04-29 | End: 2022-05-06

## 2022-04-29 NOTE — TELEPHONE ENCOUNTER
Patient was started on Augmentin and now has developed an yeast infection. Asking if you will prescribe medication for treatment. Patient having issues with new my chart account.

## 2022-05-07 ENCOUNTER — E-VISIT (OUTPATIENT)
Dept: PRIMARY CARE CLINIC | Age: 42
End: 2022-05-07

## 2022-05-07 DIAGNOSIS — G43.811 OTHER MIGRAINE WITH STATUS MIGRAINOSUS, INTRACTABLE: Primary | ICD-10-CM

## 2022-05-07 DIAGNOSIS — G43.011 INTRACTABLE MIGRAINE WITHOUT AURA AND WITH STATUS MIGRAINOSUS: ICD-10-CM

## 2022-05-07 PROCEDURE — 99422 OL DIG E/M SVC 11-20 MIN: CPT | Performed by: NURSE PRACTITIONER

## 2022-05-07 RX ORDER — RIZATRIPTAN BENZOATE 10 MG/1
TABLET ORAL
Qty: 9 TABLET | Refills: 0 | Status: SHIPPED | OUTPATIENT
Start: 2022-05-07 | End: 2022-09-26 | Stop reason: SDUPTHER

## 2022-05-07 RX ORDER — ONDANSETRON 4 MG/1
4 TABLET, FILM COATED ORAL EVERY 8 HOURS PRN
Qty: 20 TABLET | Refills: 0 | Status: SHIPPED | OUTPATIENT
Start: 2022-05-07 | End: 2022-07-14 | Stop reason: ALTCHOICE

## 2022-05-07 NOTE — PROGRESS NOTES
Reviewed questionnaire     Reviewed meds/allergies    Dx Migraine    Plan Rx given for imitrex and zofran, follow up with PCP if no improvement    Time spent on visit 11 min

## 2022-05-25 DIAGNOSIS — F41.9 ANXIETY: ICD-10-CM

## 2022-05-25 DIAGNOSIS — E88.81 METABOLIC SYNDROME: ICD-10-CM

## 2022-05-25 DIAGNOSIS — R63.5 WEIGHT GAIN: ICD-10-CM

## 2022-05-25 RX ORDER — ORAL SEMAGLUTIDE 7 MG/1
TABLET ORAL
Qty: 90 TABLET | Refills: 1 | Status: SHIPPED | OUTPATIENT
Start: 2022-05-25 | End: 2022-11-04

## 2022-05-25 RX ORDER — ALPRAZOLAM 0.5 MG/1
TABLET ORAL
Qty: 15 TABLET | Refills: 1 | Status: SHIPPED | OUTPATIENT
Start: 2022-05-25 | End: 2022-06-24

## 2022-07-14 ENCOUNTER — TELEMEDICINE (OUTPATIENT)
Dept: INTERNAL MEDICINE CLINIC | Age: 42
End: 2022-07-14
Payer: COMMERCIAL

## 2022-07-14 DIAGNOSIS — U07.1 COVID: Primary | ICD-10-CM

## 2022-07-14 DIAGNOSIS — J45.20 MILD INTERMITTENT ASTHMA, UNSPECIFIED WHETHER COMPLICATED: ICD-10-CM

## 2022-07-14 PROCEDURE — 99213 OFFICE O/P EST LOW 20 MIN: CPT | Performed by: NURSE PRACTITIONER

## 2022-07-14 RX ORDER — METHYLPREDNISOLONE 4 MG/1
TABLET ORAL
Qty: 1 KIT | Refills: 0 | Status: SHIPPED | OUTPATIENT
Start: 2022-07-14 | End: 2022-07-20

## 2022-07-14 RX ORDER — GUAIFENESIN AND CODEINE PHOSPHATE 100; 10 MG/5ML; MG/5ML
10 SOLUTION ORAL 3 TIMES DAILY PRN
Qty: 210 ML | Refills: 0 | Status: SHIPPED | OUTPATIENT
Start: 2022-07-14 | End: 2022-07-21

## 2022-07-14 ASSESSMENT — PATIENT HEALTH QUESTIONNAIRE - PHQ9
6. FEELING BAD ABOUT YOURSELF - OR THAT YOU ARE A FAILURE OR HAVE LET YOURSELF OR YOUR FAMILY DOWN: 0
9. THOUGHTS THAT YOU WOULD BE BETTER OFF DEAD, OR OF HURTING YOURSELF: 0
7. TROUBLE CONCENTRATING ON THINGS, SUCH AS READING THE NEWSPAPER OR WATCHING TELEVISION: 0
2. FEELING DOWN, DEPRESSED OR HOPELESS: 0
10. IF YOU CHECKED OFF ANY PROBLEMS, HOW DIFFICULT HAVE THESE PROBLEMS MADE IT FOR YOU TO DO YOUR WORK, TAKE CARE OF THINGS AT HOME, OR GET ALONG WITH OTHER PEOPLE: 0
SUM OF ALL RESPONSES TO PHQ QUESTIONS 1-9: 0
3. TROUBLE FALLING OR STAYING ASLEEP: 0
SUM OF ALL RESPONSES TO PHQ QUESTIONS 1-9: 0
5. POOR APPETITE OR OVEREATING: 0
8. MOVING OR SPEAKING SO SLOWLY THAT OTHER PEOPLE COULD HAVE NOTICED. OR THE OPPOSITE, BEING SO FIGETY OR RESTLESS THAT YOU HAVE BEEN MOVING AROUND A LOT MORE THAN USUAL: 0
SUM OF ALL RESPONSES TO PHQ QUESTIONS 1-9: 0
SUM OF ALL RESPONSES TO PHQ QUESTIONS 1-9: 0
1. LITTLE INTEREST OR PLEASURE IN DOING THINGS: 0
SUM OF ALL RESPONSES TO PHQ9 QUESTIONS 1 & 2: 0
4. FEELING TIRED OR HAVING LITTLE ENERGY: 0

## 2022-07-14 ASSESSMENT — ENCOUNTER SYMPTOMS: COUGH: 1

## 2022-07-14 NOTE — PROGRESS NOTES
2022    TELEHEALTH EVALUATION -- Audio/Visual (During XJGVY-27 public health emergency)    HPI:    Noemi Archer (:  1980) has requested an audio/video evaluation for the following concern(s):    She has cough, headache, myalgia, fever. Symptom started last night. Temp 101 with Nyquil and Tylenol, Advil. She tested positive for COVID. Family tested negative. Review of Systems   Constitutional: Positive for chills, fatigue and fever. Respiratory: Positive for cough. Musculoskeletal: Positive for arthralgias and myalgias. Neurological: Positive for dizziness and headaches. Prior to Visit Medications    Medication Sig Taking? Authorizing Provider   RYBELSUS 7 MG TABS TAKE 1 TABLET BY MOUTH ONE TIME A DAY Yes Mahogany Ramirez MD   rizatriptan (MAXALT) 10 MG tablet 1 at onset May repeat in 2 hours x2 within 24 hrs if needed for WARNER Yes Jose Alberto Snell APRN - CNP   valACYclovir (VALTREX) 500 MG tablet TAKE 1 TABLET BY MOUTH ONE TIME A DAY Yes Mahogany Ramirez MD   furosemide (LASIX) 20 MG tablet TAKE 1 TABLET BY MOUTH ONE TIME A DAY Yes Mahogany Ramirez MD   buPROPion (WELLBUTRIN XL) 150 MG extended release tablet Take 3 tablets daily Yes Mahogany Ramirez MD   esomeprazole (NEXIUM) 20 MG delayed release capsule Take 1 capsule by mouth daily Yes Mahogany Ramirez MD   albuterol sulfate HFA (PROVENTIL HFA) 108 (90 Base) MCG/ACT inhaler Inhale 1 puff into the lungs every 4 hours as needed for Wheezing or Shortness of Breath  Patient not taking: Reported on 2022  Mahogany Ramirez MD       Social History     Tobacco Use    Smoking status: Former Smoker     Quit date: 10/12/2012     Years since quittin.7    Smokeless tobacco: Never Used   Vaping Use    Vaping Use: Every day    Substances: Never   Substance Use Topics    Alcohol use: Yes    Drug use:  No            PHYSICAL EXAMINATION:  [ INSTRUCTIONS:  \"[x]\" Indicates a positive item  \"[]\" Indicates a negative item  -- DELETE ALL ITEMS NOT EXAMINED]  Vital Signs: (As obtained by patient/caregiver or practitioner observation)    Blood pressure-  Heart rate-    Respiratory rate-    Temperature-  Pulse oximetry-     Constitutional: [x] Appears well-developed and well-nourished [] No apparent distress      [x] Abnormal- ill but non-toxic, lying bed  Mental status  [x] Alert and awake  [x] Oriented to person/place/time [x]Able to follow commands      Eyes:  EOM    [x]  Normal  [] Abnormal-  Sclera  [x]  Normal  [] Abnormal -         Discharge [x]  None visible  [] Abnormal -    HENT:   [x] Normocephalic, atraumatic. [] Abnormal   [x] Mouth/Throat: Mucous membranes are moist.     External Ears [x] Normal  [] Abnormal-     Neck: [x] No visualized mass     Pulmonary/Chest: [x] Respiratory effort normal.  [x] No visualized signs of difficulty breathing or respiratory distress        [x] Abnormal- freq coughing     Musculoskeletal:   [x] Normal gait with no signs of ataxia         [x] Normal range of motion of neck        [] Abnormal-       Neurological:        [x] No Facial Asymmetry (Cranial nerve 7 motor function) (limited exam to video visit)          [x] No gaze palsy        [] Abnormal-         Skin:        [x] No significant exanthematous lesions or discoloration noted on facial skin         [] Abnormal-            Psychiatric:       [x] Normal Affect [x] No Hallucinations        [] Abnormal-     Other pertinent observable physical exam findings-         ASSESSMENT/PLAN:  1. COVID  - 2 days illness  - Home COVID +  - At risk for worsening with underlying asthma history  - Discussed risk vs benefit and she wishes to proceed with antiviral tx.  - Medrol pack for asthma flare  - Codeine for cough  - Advised to go to ER for rapidly worsening or severe symptoms    - nirmatrelvir/ritonavir (PAXLOVID) 20 x 150 MG & 10 x 100MG; Take 3 tablets (two 150 mg nirmatrelvir and one 100 mg ritonavir tablets) by mouth every 12 hours for 5 days.   Dispense: 30 tablet; Refill: 0  - methylPREDNISolone (MEDROL, ALFREDO,) 4 MG tablet; Take as directed  Dispense: 1 kit; Refill: 0  - guaiFENesin-codeine (TUSSI-ORGANIDIN NR) 100-10 MG/5ML syrup; Take 10 mLs by mouth 3 times daily as needed for Cough or Congestion for up to 7 days. Dispense: 210 mL; Refill: 0    2. Mild intermittent asthma, unspecified whether complicated  - methylPREDNISolone (MEDROL, ALFREDO,) 4 MG tablet; Take as directed  Dispense: 1 kit; Refill: 0  - guaiFENesin-codeine (TUSSI-ORGANIDIN NR) 100-10 MG/5ML syrup; Take 10 mLs by mouth 3 times daily as needed for Cough or Congestion for up to 7 days. Dispense: 210 mL; Refill: 0      No follow-ups on file. Elise Bah, was evaluated through a synchronous (real-time) audio-video encounter. The patient (or guardian if applicable) is aware that this is a billable service, which includes applicable co-pays. This Virtual Visit was conducted with patient's (and/or legal guardian's) consent. The visit was conducted pursuant to the emergency declaration under the [address] [place] [address] authority and the [employer] and [employer] General Act. Patient identification was verified, and a caregiver was present when appropriate. The patient was located at Home: [place] [phone]. Provider was located at [facility] (Appt Dept): [address] [place] [address],  [address] [facility]. Total time spent on this encounter: Not billed by time    --RICA Nolen CNP on 7/14/2022 at 9:37 AM    An electronic signature was used to authenticate this note.

## 2022-08-11 DIAGNOSIS — B00.2 RECURRENT ORAL HERPES SIMPLEX: ICD-10-CM

## 2022-08-12 RX ORDER — BUPROPION HYDROCHLORIDE 150 MG/1
TABLET ORAL
Qty: 270 TABLET | Refills: 1 | Status: SHIPPED | OUTPATIENT
Start: 2022-08-12

## 2022-08-12 RX ORDER — VALACYCLOVIR HYDROCHLORIDE 500 MG/1
TABLET, FILM COATED ORAL
Qty: 90 TABLET | Refills: 1 | Status: SHIPPED | OUTPATIENT
Start: 2022-08-12

## 2022-08-12 RX ORDER — FUROSEMIDE 20 MG/1
TABLET ORAL
Qty: 90 TABLET | Refills: 1 | Status: SHIPPED | OUTPATIENT
Start: 2022-08-12

## 2022-08-30 DIAGNOSIS — Z00.00 ROUTINE ADULT HEALTH MAINTENANCE: Primary | ICD-10-CM

## 2022-08-31 DIAGNOSIS — Z00.00 ROUTINE ADULT HEALTH MAINTENANCE: ICD-10-CM

## 2022-08-31 LAB
A/G RATIO: 2 (ref 1.1–2.2)
ALBUMIN SERPL-MCNC: 4.4 G/DL (ref 3.4–5)
ALP BLD-CCNC: 56 U/L (ref 40–129)
ALT SERPL-CCNC: 13 U/L (ref 10–40)
ANION GAP SERPL CALCULATED.3IONS-SCNC: 14 MMOL/L (ref 3–16)
AST SERPL-CCNC: 14 U/L (ref 15–37)
BASOPHILS ABSOLUTE: 0.1 K/UL (ref 0–0.2)
BASOPHILS RELATIVE PERCENT: 1.2 %
BILIRUB SERPL-MCNC: 0.4 MG/DL (ref 0–1)
BUN BLDV-MCNC: 6 MG/DL (ref 7–20)
CALCIUM SERPL-MCNC: 9.1 MG/DL (ref 8.3–10.6)
CHLORIDE BLD-SCNC: 108 MMOL/L (ref 99–110)
CHOLESTEROL, TOTAL: 149 MG/DL (ref 0–199)
CO2: 21 MMOL/L (ref 21–32)
CREAT SERPL-MCNC: 0.7 MG/DL (ref 0.6–1.1)
EOSINOPHILS ABSOLUTE: 0.2 K/UL (ref 0–0.6)
EOSINOPHILS RELATIVE PERCENT: 3.2 %
GFR AFRICAN AMERICAN: >60
GFR NON-AFRICAN AMERICAN: >60
GLUCOSE BLD-MCNC: 87 MG/DL (ref 70–99)
HCT VFR BLD CALC: 41.6 % (ref 36–48)
HDLC SERPL-MCNC: 55 MG/DL (ref 40–60)
HEMOGLOBIN: 14.2 G/DL (ref 12–16)
LDL CHOLESTEROL CALCULATED: 74 MG/DL
LYMPHOCYTES ABSOLUTE: 1.6 K/UL (ref 1–5.1)
LYMPHOCYTES RELATIVE PERCENT: 29.1 %
MCH RBC QN AUTO: 33.1 PG (ref 26–34)
MCHC RBC AUTO-ENTMCNC: 34.2 G/DL (ref 31–36)
MCV RBC AUTO: 96.6 FL (ref 80–100)
MONOCYTES ABSOLUTE: 0.4 K/UL (ref 0–1.3)
MONOCYTES RELATIVE PERCENT: 7.5 %
NEUTROPHILS ABSOLUTE: 3.3 K/UL (ref 1.7–7.7)
NEUTROPHILS RELATIVE PERCENT: 59 %
PDW BLD-RTO: 12.8 % (ref 12.4–15.4)
PLATELET # BLD: 256 K/UL (ref 135–450)
PMV BLD AUTO: 9.8 FL (ref 5–10.5)
POTASSIUM SERPL-SCNC: 4.6 MMOL/L (ref 3.5–5.1)
RBC # BLD: 4.31 M/UL (ref 4–5.2)
SODIUM BLD-SCNC: 143 MMOL/L (ref 136–145)
T4 FREE: 1.3 NG/DL (ref 0.9–1.8)
TOTAL PROTEIN: 6.6 G/DL (ref 6.4–8.2)
TRIGL SERPL-MCNC: 99 MG/DL (ref 0–150)
TSH SERPL DL<=0.05 MIU/L-ACNC: 1.24 UIU/ML (ref 0.27–4.2)
VLDLC SERPL CALC-MCNC: 20 MG/DL
WBC # BLD: 5.5 K/UL (ref 4–11)

## 2022-09-12 ENCOUNTER — HOSPITAL ENCOUNTER (OUTPATIENT)
Dept: MAMMOGRAPHY | Age: 42
Discharge: HOME | End: 2022-09-12
Payer: COMMERCIAL

## 2022-09-12 VITALS — BODY MASS INDEX: 26.5 KG/M2 | WEIGHT: 144 LBS | HEIGHT: 62 IN

## 2022-09-12 DIAGNOSIS — Z12.31 VISIT FOR SCREENING MAMMOGRAM: ICD-10-CM

## 2022-09-12 PROCEDURE — 77063 BREAST TOMOSYNTHESIS BI: CPT

## 2022-09-14 ENCOUNTER — OFFICE VISIT (OUTPATIENT)
Dept: INTERNAL MEDICINE CLINIC | Age: 42
End: 2022-09-14
Payer: COMMERCIAL

## 2022-09-14 VITALS
DIASTOLIC BLOOD PRESSURE: 76 MMHG | TEMPERATURE: 97.2 F | BODY MASS INDEX: 26.34 KG/M2 | WEIGHT: 144 LBS | HEART RATE: 90 BPM | OXYGEN SATURATION: 99 % | SYSTOLIC BLOOD PRESSURE: 122 MMHG

## 2022-09-14 DIAGNOSIS — F41.9 ANXIETY: ICD-10-CM

## 2022-09-14 DIAGNOSIS — Z00.00 ROUTINE ADULT HEALTH MAINTENANCE: Primary | ICD-10-CM

## 2022-09-14 PROCEDURE — 99396 PREV VISIT EST AGE 40-64: CPT | Performed by: INTERNAL MEDICINE

## 2022-09-14 RX ORDER — ALPRAZOLAM 0.5 MG/1
0.5 TABLET ORAL NIGHTLY PRN
COMMUNITY

## 2022-09-14 NOTE — PROGRESS NOTES
Chief Complaint   Patient presents with    Follow-up     Be well       Assessment/Plan:  Amanda Burgos was seen today for follow-up. Diagnoses and all orders for this visit:    Routine adult health maintenance  -     CBC with Auto Differential; Future  -     Comprehensive Metabolic Panel; Future  -     Lipid Panel; Future  -     T4, Free; Future  -     TSH; Future    Anxiety  Comments:  Chronic, intermittent and well controlled. Vitals:    09/14/22 0828   BP: 122/76   Pulse: 90   Temp: 97.2 °F (36.2 °C)   SpO2: 99%     Physical Exam  Vitals reviewed. Constitutional:       General: She is not in acute distress. Appearance: She is well-developed. She is not diaphoretic. HENT:      Head: Normocephalic and atraumatic. Mouth/Throat:      Mouth: Mucous membranes are moist.      Pharynx: Oropharynx is clear. No oropharyngeal exudate or posterior oropharyngeal erythema. Eyes:      General: No scleral icterus. Right eye: No discharge. Left eye: No discharge. Extraocular Movements: Extraocular movements intact. Conjunctiva/sclera: Conjunctivae normal.      Pupils: Pupils are equal, round, and reactive to light. Cardiovascular:      Rate and Rhythm: Normal rate and regular rhythm. Pulses: Normal pulses. Heart sounds: Normal heart sounds. No murmur heard. No friction rub. No gallop. Pulmonary:      Effort: Pulmonary effort is normal. No respiratory distress. Breath sounds: No stridor. No wheezing, rhonchi or rales. Chest:      Chest wall: No tenderness. Abdominal:      General: Abdomen is flat. There is no distension. Palpations: Abdomen is soft. There is no mass. Tenderness: There is no abdominal tenderness. There is no guarding or rebound. Hernia: No hernia is present. Musculoskeletal:      Cervical back: Normal range of motion. Lymphadenopathy:      Cervical: No cervical adenopathy. Skin:     General: Skin is warm and dry. Findings: No lesion or rash. Neurological:      Mental Status: She is alert and oriented to person, place, and time. Cranial Nerves: No cranial nerve deficit. Psychiatric:         Behavior: Behavior normal.         Thought Content:  Thought content normal.         Judgment: Judgment normal.       PHQ Scores 7/14/2022 10/27/2021 2/19/2021 9/14/2020 2/4/2020 7/9/2019   PHQ2 Score 0 1 1 0 6 0   PHQ9 Score 0 1 1 0 15 0     Interpretation of Total Score Depression Severity: 1-4 = Minimal depression, 5-9 = Mild depression, 10-14 = Moderate depression, 15-19 = Moderately severe depression, 20-27 = Severe depression    MD Sommer Mendez

## 2022-09-26 DIAGNOSIS — G43.011 INTRACTABLE MIGRAINE WITHOUT AURA AND WITH STATUS MIGRAINOSUS: ICD-10-CM

## 2022-09-26 RX ORDER — RIZATRIPTAN BENZOATE 10 MG/1
TABLET ORAL
Qty: 9 TABLET | Refills: 3 | Status: SHIPPED | OUTPATIENT
Start: 2022-09-26

## 2022-10-05 ENCOUNTER — OFFICE VISIT (OUTPATIENT)
Dept: INTERNAL MEDICINE CLINIC | Age: 42
End: 2022-10-05
Payer: COMMERCIAL

## 2022-10-05 VITALS
HEART RATE: 84 BPM | DIASTOLIC BLOOD PRESSURE: 70 MMHG | BODY MASS INDEX: 26.5 KG/M2 | SYSTOLIC BLOOD PRESSURE: 110 MMHG | HEIGHT: 62 IN | WEIGHT: 144 LBS | OXYGEN SATURATION: 99 %

## 2022-10-05 DIAGNOSIS — J01.40 ACUTE NON-RECURRENT PANSINUSITIS: Primary | ICD-10-CM

## 2022-10-05 PROCEDURE — 99213 OFFICE O/P EST LOW 20 MIN: CPT | Performed by: NURSE PRACTITIONER

## 2022-10-05 RX ORDER — ONDANSETRON 4 MG/1
4 TABLET, FILM COATED ORAL DAILY PRN
Qty: 30 TABLET | Refills: 0 | Status: SHIPPED | OUTPATIENT
Start: 2022-10-05

## 2022-10-05 RX ORDER — AMOXICILLIN AND CLAVULANATE POTASSIUM 875; 125 MG/1; MG/1
1 TABLET, FILM COATED ORAL 2 TIMES DAILY
Qty: 20 TABLET | Refills: 0 | Status: SHIPPED | OUTPATIENT
Start: 2022-10-05 | End: 2022-10-20 | Stop reason: SDUPTHER

## 2022-10-05 ASSESSMENT — ENCOUNTER SYMPTOMS
SINUS PRESSURE: 1
RESPIRATORY NEGATIVE: 1
SINUS PAIN: 1

## 2022-10-05 NOTE — PROGRESS NOTES
SUBJECTIVE:    Patient ID: Antony White is a 38 y.o. female. CC: Sinuses    HPI: The patient presents to the office for an acute visit. She reports sinus pain, tenderness, congestion for about 1 month. Symptoms are worsening over time. She has some drainage but mostly congestion. Intermittent, low-grade temperatures  She has associated sinus tenderness, headaches, ear congestion. She has tried a number of over-the-counter remedies without improvement. Current Outpatient Medications   Medication Sig Dispense Refill    rizatriptan (MAXALT) 10 MG tablet 1 at onset May repeat in 2 hours x2 within 24 hrs if needed for HA 9 tablet 3    ALPRAZolam (XANAX) 0.5 MG tablet Take 0.5 mg by mouth nightly as needed for Sleep.      valACYclovir (VALTREX) 500 MG tablet TAKE 1 TABLET BY MOUTH ONE TIME A DAY 90 tablet 1    buPROPion (WELLBUTRIN XL) 150 MG extended release tablet TAKE 3 TABLETS BY MOUTH EVERY MORNING AS DIRECTED 270 tablet 1    furosemide (LASIX) 20 MG tablet TAKE 1 TABLET BY MOUTH ONE TIME A DAY 90 tablet 1    RYBELSUS 7 MG TABS TAKE 1 TABLET BY MOUTH ONE TIME A DAY 90 tablet 1    esomeprazole (NEXIUM) 20 MG delayed release capsule Take 1 capsule by mouth daily 90 capsule 3    albuterol sulfate HFA (PROVENTIL HFA) 108 (90 Base) MCG/ACT inhaler Inhale 1 puff into the lungs every 4 hours as needed for Wheezing or Shortness of Breath 1 Inhaler 5     No current facility-administered medications for this visit. Review of Systems   Constitutional:  Positive for fever. HENT:  Positive for congestion, ear pain, sinus pressure and sinus pain. Negative for ear discharge. Respiratory: Negative. OBJECTIVE:  Physical Exam  Constitutional:       Appearance: Normal appearance. HENT:      Head: Normocephalic and atraumatic.       Right Ear: Tympanic membrane normal.      Left Ear: Tympanic membrane normal.      Nose:      Right Sinus: Maxillary sinus tenderness and frontal sinus tenderness present. Left Sinus: Maxillary sinus tenderness and frontal sinus tenderness present. Mouth/Throat:      Lips: Pink. Mouth: Mucous membranes are moist.   Pulmonary:      Effort: Pulmonary effort is normal. No respiratory distress. Breath sounds: Normal breath sounds. Skin:     General: Skin is warm and dry. Neurological:      General: No focal deficit present. Mental Status: She is alert and oriented to person, place, and time. Psychiatric:         Mood and Affect: Mood normal.         Behavior: Behavior normal.      /70   Pulse 84   Ht 5' 2\" (1.575 m)   Wt 144 lb (65.3 kg)   LMP 09/05/2022   SpO2 99%   BMI 26.34 kg/m²      PHQ Scores 7/14/2022 10/27/2021 2/19/2021 9/14/2020 2/4/2020 7/9/2019   PHQ2 Score 0 1 1 0 6 0   PHQ9 Score 0 1 1 0 15 0     Interpretation of Total Score Depression Severity: 1-4 = Minimal depression, 5-9 = Mild depression, 10-14 = Moderate depression, 15-19 = Moderately severe depression, 20-27 =Severe depression      ASSESSMENT/PLAN:  Charo Archer was seen today for sinus problem. Diagnoses and all orders for this visit:    Acute non-recurrent pansinusitis  -     amoxicillin-clavulanate (AUGMENTIN) 875-125 MG per tablet; Take 1 tablet by mouth 2 times daily for 10 days  -     ondansetron (ZOFRAN) 4 MG tablet; Take 1 tablet by mouth daily as needed for Nausea or Vomiting    - 4 weeks of URI symptoms consistent with sinusitis  - Given lack of time and improvement with appropriate OTC remedies, suspect bacterial etiology  - Will try course of antibiotic. She declines oral steroids in favor of Flonase.   Continue warm compresses, Tylenol, Netipot, etc., prn  - Follow-up prn      RICA Lee - CNP

## 2022-10-12 RX ORDER — METHYLPREDNISOLONE 4 MG/1
TABLET ORAL
Qty: 1 KIT | Refills: 0 | Status: SHIPPED | OUTPATIENT
Start: 2022-10-12 | End: 2022-10-18

## 2022-10-20 DIAGNOSIS — J01.40 ACUTE NON-RECURRENT PANSINUSITIS: ICD-10-CM

## 2022-10-20 RX ORDER — AMOXICILLIN AND CLAVULANATE POTASSIUM 875; 125 MG/1; MG/1
1 TABLET, FILM COATED ORAL 2 TIMES DAILY
Qty: 20 TABLET | Refills: 0 | Status: SHIPPED | OUTPATIENT
Start: 2022-10-20 | End: 2022-10-30

## 2022-11-04 DIAGNOSIS — E88.81 METABOLIC SYNDROME: ICD-10-CM

## 2022-11-04 DIAGNOSIS — R63.5 WEIGHT GAIN: ICD-10-CM

## 2022-11-04 RX ORDER — ORAL SEMAGLUTIDE 7 MG/1
TABLET ORAL
Qty: 90 TABLET | Refills: 1 | Status: SHIPPED | OUTPATIENT
Start: 2022-11-04

## 2022-11-14 ENCOUNTER — OFFICE VISIT (OUTPATIENT)
Dept: INTERNAL MEDICINE CLINIC | Age: 42
End: 2022-11-14
Payer: COMMERCIAL

## 2022-11-14 VITALS
TEMPERATURE: 97.4 F | DIASTOLIC BLOOD PRESSURE: 80 MMHG | OXYGEN SATURATION: 99 % | BODY MASS INDEX: 25.79 KG/M2 | WEIGHT: 141 LBS | SYSTOLIC BLOOD PRESSURE: 136 MMHG | HEART RATE: 87 BPM

## 2022-11-14 DIAGNOSIS — N39.0 URINARY TRACT INFECTION WITHOUT HEMATURIA, SITE UNSPECIFIED: ICD-10-CM

## 2022-11-14 DIAGNOSIS — G43.519 INTRACTABLE PERSISTENT MIGRAINE AURA WITHOUT CEREBRAL INFARCTION AND WITHOUT STATUS MIGRAINOSUS: Primary | ICD-10-CM

## 2022-11-14 LAB
BILIRUBIN, POC: ABNORMAL
BLOOD URINE, POC: ABNORMAL
CLARITY, POC: ABNORMAL
COLOR, POC: ABNORMAL
GLUCOSE URINE, POC: ABNORMAL
KETONES, POC: ABNORMAL
LEUKOCYTE EST, POC: ABNORMAL
NITRITE, POC: ABNORMAL
PH, POC: 7
PROTEIN, POC: 30
SPECIFIC GRAVITY, POC: 1.02
UROBILINOGEN, POC: 1

## 2022-11-14 PROCEDURE — 81002 URINALYSIS NONAUTO W/O SCOPE: CPT | Performed by: INTERNAL MEDICINE

## 2022-11-14 PROCEDURE — 99213 OFFICE O/P EST LOW 20 MIN: CPT | Performed by: INTERNAL MEDICINE

## 2022-11-14 RX ORDER — ERENUMAB-AOOE 70 MG/ML
70 INJECTION SUBCUTANEOUS
Qty: 1 ML | Refills: 5 | Status: SHIPPED | OUTPATIENT
Start: 2022-11-14

## 2022-11-14 SDOH — ECONOMIC STABILITY: FOOD INSECURITY: WITHIN THE PAST 12 MONTHS, THE FOOD YOU BOUGHT JUST DIDN'T LAST AND YOU DIDN'T HAVE MONEY TO GET MORE.: NEVER TRUE

## 2022-11-14 SDOH — ECONOMIC STABILITY: FOOD INSECURITY: WITHIN THE PAST 12 MONTHS, YOU WORRIED THAT YOUR FOOD WOULD RUN OUT BEFORE YOU GOT MONEY TO BUY MORE.: NEVER TRUE

## 2022-11-14 ASSESSMENT — PATIENT HEALTH QUESTIONNAIRE - PHQ9
SUM OF ALL RESPONSES TO PHQ9 QUESTIONS 1 & 2: 0
9. THOUGHTS THAT YOU WOULD BE BETTER OFF DEAD, OR OF HURTING YOURSELF: 0
4. FEELING TIRED OR HAVING LITTLE ENERGY: 0
SUM OF ALL RESPONSES TO PHQ QUESTIONS 1-9: 0
5. POOR APPETITE OR OVEREATING: 0
1. LITTLE INTEREST OR PLEASURE IN DOING THINGS: 0
SUM OF ALL RESPONSES TO PHQ QUESTIONS 1-9: 0
2. FEELING DOWN, DEPRESSED OR HOPELESS: 0
7. TROUBLE CONCENTRATING ON THINGS, SUCH AS READING THE NEWSPAPER OR WATCHING TELEVISION: 0
10. IF YOU CHECKED OFF ANY PROBLEMS, HOW DIFFICULT HAVE THESE PROBLEMS MADE IT FOR YOU TO DO YOUR WORK, TAKE CARE OF THINGS AT HOME, OR GET ALONG WITH OTHER PEOPLE: 0
SUM OF ALL RESPONSES TO PHQ QUESTIONS 1-9: 0
6. FEELING BAD ABOUT YOURSELF - OR THAT YOU ARE A FAILURE OR HAVE LET YOURSELF OR YOUR FAMILY DOWN: 0
8. MOVING OR SPEAKING SO SLOWLY THAT OTHER PEOPLE COULD HAVE NOTICED. OR THE OPPOSITE, BEING SO FIGETY OR RESTLESS THAT YOU HAVE BEEN MOVING AROUND A LOT MORE THAN USUAL: 0
3. TROUBLE FALLING OR STAYING ASLEEP: 0
SUM OF ALL RESPONSES TO PHQ QUESTIONS 1-9: 0

## 2022-11-14 ASSESSMENT — SOCIAL DETERMINANTS OF HEALTH (SDOH): HOW HARD IS IT FOR YOU TO PAY FOR THE VERY BASICS LIKE FOOD, HOUSING, MEDICAL CARE, AND HEATING?: NOT HARD AT ALL

## 2022-11-14 NOTE — PROGRESS NOTES
Chief Complaint   Patient presents with    Migraine    Urinary Frequency       Assessment/Plan:  Kasey Fang was seen today for migraine and urinary frequency. 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 Constitutional:       General: She is not in acute distress. Appearance: She is well-developed. She is not diaphoretic. HENT:      Head: Normocephalic and atraumatic. Pulmonary:      Effort: Pulmonary effort is normal.   Neurological:      Mental Status: She is alert and oriented to person, place, and time. Cranial Nerves: No cranial nerve deficit. Psychiatric:         Behavior: Behavior normal.         Thought Content:  Thought content normal.         Judgment: Judgment normal.       PHQ Scores 11/14/2022 7/14/2022 10/27/2021 2/19/2021 9/14/2020 2/4/2020 7/9/2019   PHQ2 Score 0 0 1 1 0 6 0   PHQ9 Score 0 0 1 1 0 15 0     Interpretation of Total Score Depression Severity: 1-4 = Minimal depression, 5-9 = Mild depression, 10-14 = Moderate depression, 15-19 = Moderately severe depression, 20-27 = Severe depression    MD Dusty Baptiste

## 2022-11-16 LAB
ORGANISM: ABNORMAL
URINE CULTURE, ROUTINE: ABNORMAL

## 2022-12-05 RX ORDER — TOPIRAMATE 25 MG/1
TABLET ORAL
Qty: 90 TABLET | OUTPATIENT
Start: 2022-12-05

## 2022-12-05 RX ORDER — TOPIRAMATE 25 MG/1
25 TABLET ORAL NIGHTLY
Qty: 60 TABLET | Refills: 3 | Status: SHIPPED | OUTPATIENT
Start: 2022-12-05

## 2022-12-08 DIAGNOSIS — F41.9 ANXIETY: Primary | ICD-10-CM

## 2022-12-12 RX ORDER — ALPRAZOLAM 0.5 MG/1
TABLET ORAL
Qty: 15 TABLET | Refills: 0 | Status: SHIPPED | OUTPATIENT
Start: 2022-12-12 | End: 2023-01-11

## 2023-01-30 ENCOUNTER — TELEPHONE (OUTPATIENT)
Dept: INTERNAL MEDICINE CLINIC | Age: 43
End: 2023-01-30

## 2023-01-30 ENCOUNTER — OFFICE VISIT (OUTPATIENT)
Dept: INTERNAL MEDICINE CLINIC | Age: 43
End: 2023-01-30
Payer: COMMERCIAL

## 2023-01-30 VITALS
HEART RATE: 100 BPM | SYSTOLIC BLOOD PRESSURE: 116 MMHG | HEIGHT: 62 IN | OXYGEN SATURATION: 94 % | BODY MASS INDEX: 25.03 KG/M2 | WEIGHT: 136 LBS | DIASTOLIC BLOOD PRESSURE: 80 MMHG

## 2023-01-30 DIAGNOSIS — J10.1 INFLUENZA A: Primary | ICD-10-CM

## 2023-01-30 PROCEDURE — 99213 OFFICE O/P EST LOW 20 MIN: CPT | Performed by: NURSE PRACTITIONER

## 2023-01-30 RX ORDER — BENZONATATE 100 MG/1
100 CAPSULE ORAL EVERY 6 HOURS PRN
Qty: 30 CAPSULE | Refills: 0 | Status: SHIPPED | OUTPATIENT
Start: 2023-01-30 | End: 2023-02-09

## 2023-01-30 RX ORDER — GUAIFENESIN AND CODEINE PHOSPHATE 100; 10 MG/5ML; MG/5ML
10 SOLUTION ORAL 4 TIMES DAILY PRN
Qty: 280 ML | Refills: 0 | Status: SHIPPED | OUTPATIENT
Start: 2023-01-30 | End: 2023-02-06

## 2023-01-30 RX ORDER — OSELTAMIVIR PHOSPHATE 75 MG/1
75 CAPSULE ORAL 2 TIMES DAILY
Qty: 10 CAPSULE | Refills: 0 | Status: SHIPPED | OUTPATIENT
Start: 2023-01-30 | End: 2023-02-04

## 2023-01-30 ASSESSMENT — PATIENT HEALTH QUESTIONNAIRE - PHQ9
SUM OF ALL RESPONSES TO PHQ QUESTIONS 1-9: 0
SUM OF ALL RESPONSES TO PHQ9 QUESTIONS 1 & 2: 0
10. IF YOU CHECKED OFF ANY PROBLEMS, HOW DIFFICULT HAVE THESE PROBLEMS MADE IT FOR YOU TO DO YOUR WORK, TAKE CARE OF THINGS AT HOME, OR GET ALONG WITH OTHER PEOPLE: 0
8. MOVING OR SPEAKING SO SLOWLY THAT OTHER PEOPLE COULD HAVE NOTICED. OR THE OPPOSITE, BEING SO FIGETY OR RESTLESS THAT YOU HAVE BEEN MOVING AROUND A LOT MORE THAN USUAL: 0
SUM OF ALL RESPONSES TO PHQ QUESTIONS 1-9: 0
4. FEELING TIRED OR HAVING LITTLE ENERGY: 0
SUM OF ALL RESPONSES TO PHQ QUESTIONS 1-9: 0
6. FEELING BAD ABOUT YOURSELF - OR THAT YOU ARE A FAILURE OR HAVE LET YOURSELF OR YOUR FAMILY DOWN: 0
3. TROUBLE FALLING OR STAYING ASLEEP: 0
7. TROUBLE CONCENTRATING ON THINGS, SUCH AS READING THE NEWSPAPER OR WATCHING TELEVISION: 0
9. THOUGHTS THAT YOU WOULD BE BETTER OFF DEAD, OR OF HURTING YOURSELF: 0
5. POOR APPETITE OR OVEREATING: 0
1. LITTLE INTEREST OR PLEASURE IN DOING THINGS: 0
SUM OF ALL RESPONSES TO PHQ QUESTIONS 1-9: 0
2. FEELING DOWN, DEPRESSED OR HOPELESS: 0

## 2023-01-30 ASSESSMENT — ENCOUNTER SYMPTOMS
GASTROINTESTINAL NEGATIVE: 1
COUGH: 1

## 2023-01-30 NOTE — PROGRESS NOTES
SUBJECTIVE:    Patient ID: Jennifer Reyes is a 38 y.o. female. CC: Flu    HPI: The patient presents to the office for an acute visit. The patient presents to the office today for influenza. Originally started feeling sick about 1 week ago. Symptoms worsen and she tested positive for influenza a 3 days ago. Fever seems improved today but cough is bothersome. She has myalgia and headaches. Cough is primarily dry. Minimal improvement with over-the-counter cough remedies. Current Outpatient Medications   Medication Sig Dispense Refill    benzonatate (TESSALON PERLES) 100 MG capsule Take 1 capsule by mouth every 6 hours as needed for Cough 30 capsule 0    guaiFENesin-codeine (TUSSI-ORGANIDIN NR) 100-10 MG/5ML syrup Take 10 mLs by mouth 4 times daily as needed for Cough for up to 7 days.  Max Daily Amount: 40 mLs 280 mL 0    oseltamivir (TAMIFLU) 75 MG capsule Take 1 capsule by mouth 2 times daily for 5 days 10 capsule 0    esomeprazole (NEXIUM) 20 MG delayed release capsule Take 1 capsule by mouth daily 90 capsule 3    topiramate (TOPAMAX) 25 MG tablet Take 1 tablet by mouth nightly 60 tablet 3    RYBELSUS 7 MG TABS TAKE 1 TABLET BY MOUTH ONE TIME A DAY 90 tablet 1    ondansetron (ZOFRAN) 4 MG tablet Take 1 tablet by mouth daily as needed for Nausea or Vomiting 30 tablet 0    rizatriptan (MAXALT) 10 MG tablet 1 at onset May repeat in 2 hours x2 within 24 hrs if needed for HA 9 tablet 3    valACYclovir (VALTREX) 500 MG tablet TAKE 1 TABLET BY MOUTH ONE TIME A DAY 90 tablet 1    buPROPion (WELLBUTRIN XL) 150 MG extended release tablet TAKE 3 TABLETS BY MOUTH EVERY MORNING AS DIRECTED 270 tablet 1    furosemide (LASIX) 20 MG tablet TAKE 1 TABLET BY MOUTH ONE TIME A DAY 90 tablet 1    albuterol sulfate HFA (PROVENTIL HFA) 108 (90 Base) MCG/ACT inhaler Inhale 1 puff into the lungs every 4 hours as needed for Wheezing or Shortness of Breath 1 Inhaler 5    Erenumab-aooe (AIMOVIG) 70 MG/ML SOAJ Inject 70 mg into the skin every 30 days 1 mL 5     No current facility-administered medications for this visit. Review of Systems   Constitutional:  Positive for fatigue. Negative for chills and fever. Respiratory:  Positive for cough. Cardiovascular: Negative. Gastrointestinal: Negative. Genitourinary: Negative. Musculoskeletal: Negative. Skin: Negative. Neurological:  Positive for headaches. Psychiatric/Behavioral: Negative. OBJECTIVE:  Physical Exam  Constitutional:       General: She is not in acute distress. Appearance: Normal appearance. She is ill-appearing. She is not toxic-appearing. HENT:      Head: Normocephalic and atraumatic. Cardiovascular:      Rate and Rhythm: Normal rate and regular rhythm. Pulmonary:      Effort: Pulmonary effort is normal.      Breath sounds: Normal breath sounds. No wheezing, rhonchi or rales. Comments: Frequent, deep, bronchial coughing, non-productive    Skin:     General: Skin is warm and dry. Neurological:      General: No focal deficit present. Mental Status: She is alert and oriented to person, place, and time. Psychiatric:         Mood and Affect: Mood normal.         Behavior: Behavior normal.      /80   Pulse 100   Ht 5' 2\" (1.575 m)   Wt 136 lb (61.7 kg)   SpO2 94%   BMI 24.87 kg/m²      PHQ Scores 1/30/2023 11/14/2022 7/14/2022 10/27/2021 2/19/2021 9/14/2020 2/4/2020   PHQ2 Score 0 0 0 1 1 0 6   PHQ9 Score 0 0 0 1 1 0 15     Interpretation of Total Score Depression Severity: 1-4 = Minimal depression, 5-9 = Mild depression, 10-14 = Moderate depression, 15-19 = Moderately severe depression, 20-27 =Severe depression      ASSESSMENT/PLAN:  Elsa Lynn was seen today for cough, influenza and congestion. Diagnoses and all orders for this visit:    Influenza A  -     benzonatate (TESSALON PERLES) 100 MG capsule;  Take 1 capsule by mouth every 6 hours as needed for Cough  -     guaiFENesin-codeine (TUSSI-ORGANIDIN NR) 100-10 MG/5ML syrup; Take 10 mLs by mouth 4 times daily as needed for Cough for up to 7 days. Max Daily Amount: 40 mLs  -     oseltamivir (TAMIFLU) 75 MG capsule; Take 1 capsule by mouth 2 times daily for 5 days    - Flu A +  - She has defervesced. Still feels bad and frequent coughing  - Given persisting symptoms, will proceed with Tamiflu. She is aware this may be less helpful as it is being started greater than 72 hours after the onset of symptoms. Treat cough with Tessalon and codeine as needed    Controlled Substance Monitoring:  Acute and Chronic Pain Monitoring:   RX Monitoring 1/30/2023   Attestation -   Periodic Controlled Substance Monitoring No signs of potential drug abuse or diversion identified.          RICA Dockery - CNP

## 2023-01-30 NOTE — TELEPHONE ENCOUNTER
Pt requesting a appt, notified [facility] of request. [facility] ok with seeing pt.  He has seen her in the past. Appt scheduled

## 2023-01-31 ENCOUNTER — TELEPHONE (OUTPATIENT)
Dept: INTERNAL MEDICINE CLINIC | Age: 43
End: 2023-01-31

## 2023-01-31 ENCOUNTER — NURSE ONLY (OUTPATIENT)
Dept: INTERNAL MEDICINE CLINIC | Age: 43
End: 2023-01-31

## 2023-01-31 DIAGNOSIS — R05.1 ACUTE COUGH: Primary | ICD-10-CM

## 2023-01-31 DIAGNOSIS — R06.2 WHEEZING: ICD-10-CM

## 2023-01-31 LAB — RSV RAPID ANTIGEN: NEGATIVE

## 2023-01-31 RX ORDER — PREDNISONE 20 MG/1
TABLET ORAL
Qty: 18 TABLET | Refills: 0 | Status: SHIPPED | OUTPATIENT
Start: 2023-01-31 | End: 2023-02-15

## 2023-01-31 NOTE — TELEPHONE ENCOUNTER
She has tessalon and codeine cough syrup. She can use 10cc of codeine syrup up to 4 times per day. She can use any OTC remedies for cough with this.   I can add a steroid if she would like to try this

## 2023-01-31 NOTE — TELEPHONE ENCOUNTER
Pt has taken 3 doses of guaifenesin  last night with no relief, she is taking cough pearls without relief also. Whole body hurts from coughing. Any other options for her?

## 2023-02-03 ENCOUNTER — HOSPITAL ENCOUNTER (OUTPATIENT)
Dept: GENERAL RADIOLOGY | Age: 43
Discharge: HOME | End: 2023-02-03
Payer: COMMERCIAL

## 2023-02-03 ENCOUNTER — OFFICE VISIT (OUTPATIENT)
Dept: INTERNAL MEDICINE CLINIC | Age: 43
End: 2023-02-03
Payer: COMMERCIAL

## 2023-02-03 ENCOUNTER — HOSPITAL ENCOUNTER (OUTPATIENT)
Age: 43
Discharge: HOME | End: 2023-02-03
Payer: COMMERCIAL

## 2023-02-03 VITALS
HEART RATE: 98 BPM | TEMPERATURE: 97.7 F | SYSTOLIC BLOOD PRESSURE: 102 MMHG | WEIGHT: 134 LBS | BODY MASS INDEX: 24.51 KG/M2 | DIASTOLIC BLOOD PRESSURE: 76 MMHG | OXYGEN SATURATION: 99 %

## 2023-02-03 DIAGNOSIS — E86.1 HYPOTENSION DUE TO HYPOVOLEMIA: ICD-10-CM

## 2023-02-03 DIAGNOSIS — J40 BRONCHITIS: ICD-10-CM

## 2023-02-03 DIAGNOSIS — I95.89 HYPOTENSION DUE TO HYPOVOLEMIA: ICD-10-CM

## 2023-02-03 DIAGNOSIS — J40 BRONCHITIS: Primary | ICD-10-CM

## 2023-02-03 DIAGNOSIS — E86.0 DEHYDRATION: ICD-10-CM

## 2023-02-03 DIAGNOSIS — H69.83 DYSFUNCTION OF BOTH EUSTACHIAN TUBES: ICD-10-CM

## 2023-02-03 LAB
ANION GAP SERPL CALCULATED.3IONS-SCNC: 11 MMOL/L (ref 3–16)
BASOPHILS ABSOLUTE: 0.1 K/UL (ref 0–0.2)
BASOPHILS RELATIVE PERCENT: 2.1 %
BUN BLDV-MCNC: 7 MG/DL (ref 7–20)
CALCIUM SERPL-MCNC: 9.6 MG/DL (ref 8.3–10.6)
CHLORIDE BLD-SCNC: 102 MMOL/L (ref 99–110)
CO2: 26 MMOL/L (ref 21–32)
CREAT SERPL-MCNC: 0.6 MG/DL (ref 0.6–1.1)
EOSINOPHILS ABSOLUTE: 0.2 K/UL (ref 0–0.6)
EOSINOPHILS RELATIVE PERCENT: 3.8 %
GFR SERPL CREATININE-BSD FRML MDRD: >60 ML/MIN/{1.73_M2}
GLUCOSE BLD-MCNC: 85 MG/DL (ref 70–99)
HCT VFR BLD CALC: 46.2 % (ref 36–48)
HEMOGLOBIN: 15.8 G/DL (ref 12–16)
LYMPHOCYTES ABSOLUTE: 1.6 K/UL (ref 1–5.1)
LYMPHOCYTES RELATIVE PERCENT: 25.9 %
MCH RBC QN AUTO: 33 PG (ref 26–34)
MCHC RBC AUTO-ENTMCNC: 34.1 G/DL (ref 31–36)
MCV RBC AUTO: 96.7 FL (ref 80–100)
MONOCYTES ABSOLUTE: 0.5 K/UL (ref 0–1.3)
MONOCYTES RELATIVE PERCENT: 7.8 %
NEUTROPHILS ABSOLUTE: 3.8 K/UL (ref 1.7–7.7)
NEUTROPHILS RELATIVE PERCENT: 60.4 %
PDW BLD-RTO: 12.4 % (ref 12.4–15.4)
PLATELET # BLD: 301 K/UL (ref 135–450)
PMV BLD AUTO: 9.9 FL (ref 5–10.5)
POTASSIUM SERPL-SCNC: 5.1 MMOL/L (ref 3.5–5.1)
RBC # BLD: 4.78 M/UL (ref 4–5.2)
SODIUM BLD-SCNC: 139 MMOL/L (ref 136–145)
WBC # BLD: 6.3 K/UL (ref 4–11)

## 2023-02-03 PROCEDURE — 71046 X-RAY EXAM CHEST 2 VIEWS: CPT

## 2023-02-03 PROCEDURE — 99213 OFFICE O/P EST LOW 20 MIN: CPT | Performed by: INTERNAL MEDICINE

## 2023-02-03 RX ORDER — LEVOFLOXACIN 500 MG/1
500 TABLET, FILM COATED ORAL DAILY
Qty: 7 TABLET | Refills: 0 | Status: SHIPPED | OUTPATIENT
Start: 2023-02-03 | End: 2023-02-10

## 2023-02-03 SDOH — ECONOMIC STABILITY: INCOME INSECURITY: HOW HARD IS IT FOR YOU TO PAY FOR THE VERY BASICS LIKE FOOD, HOUSING, MEDICAL CARE, AND HEATING?: NOT HARD AT ALL

## 2023-02-03 SDOH — ECONOMIC STABILITY: FOOD INSECURITY: WITHIN THE PAST 12 MONTHS, THE FOOD YOU BOUGHT JUST DIDN'T LAST AND YOU DIDN'T HAVE MONEY TO GET MORE.: NEVER TRUE

## 2023-02-03 SDOH — ECONOMIC STABILITY: FOOD INSECURITY: WITHIN THE PAST 12 MONTHS, YOU WORRIED THAT YOUR FOOD WOULD RUN OUT BEFORE YOU GOT MONEY TO BUY MORE.: NEVER TRUE

## 2023-02-03 SDOH — ECONOMIC STABILITY: HOUSING INSECURITY
IN THE LAST 12 MONTHS, WAS THERE A TIME WHEN YOU DID NOT HAVE A STEADY PLACE TO SLEEP OR SLEPT IN A SHELTER (INCLUDING NOW)?: NO

## 2023-02-03 ASSESSMENT — PATIENT HEALTH QUESTIONNAIRE - PHQ9
SUM OF ALL RESPONSES TO PHQ QUESTIONS 1-9: 0
3. TROUBLE FALLING OR STAYING ASLEEP: 0
5. POOR APPETITE OR OVEREATING: 0
SUM OF ALL RESPONSES TO PHQ QUESTIONS 1-9: 0
SUM OF ALL RESPONSES TO PHQ9 QUESTIONS 1 & 2: 0
2. FEELING DOWN, DEPRESSED OR HOPELESS: 0
10. IF YOU CHECKED OFF ANY PROBLEMS, HOW DIFFICULT HAVE THESE PROBLEMS MADE IT FOR YOU TO DO YOUR WORK, TAKE CARE OF THINGS AT HOME, OR GET ALONG WITH OTHER PEOPLE: 0
7. TROUBLE CONCENTRATING ON THINGS, SUCH AS READING THE NEWSPAPER OR WATCHING TELEVISION: 0
1. LITTLE INTEREST OR PLEASURE IN DOING THINGS: 0
SUM OF ALL RESPONSES TO PHQ9 QUESTIONS 1 & 2: 0
8. MOVING OR SPEAKING SO SLOWLY THAT OTHER PEOPLE COULD HAVE NOTICED. OR THE OPPOSITE, BEING SO FIGETY OR RESTLESS THAT YOU HAVE BEEN MOVING AROUND A LOT MORE THAN USUAL: 0
2. FEELING DOWN, DEPRESSED OR HOPELESS: 0
SUM OF ALL RESPONSES TO PHQ QUESTIONS 1-9: 0
4. FEELING TIRED OR HAVING LITTLE ENERGY: 0
1. LITTLE INTEREST OR PLEASURE IN DOING THINGS: 0
SUM OF ALL RESPONSES TO PHQ QUESTIONS 1-9: 0
6. FEELING BAD ABOUT YOURSELF - OR THAT YOU ARE A FAILURE OR HAVE LET YOURSELF OR YOUR FAMILY DOWN: 0
SUM OF ALL RESPONSES TO PHQ QUESTIONS 1-9: 0
9. THOUGHTS THAT YOU WOULD BE BETTER OFF DEAD, OR OF HURTING YOURSELF: 0

## 2023-02-03 NOTE — PROGRESS NOTES
Chief Complaint   Patient presents with    Influenza       Assessment/Plan:  Roly Juárez was seen today for influenza. Diagnoses and all orders for this visit:    Bronchitis  -     levoFLOXacin (LEVAQUIN) 500 MG tablet; Take 1 tablet by mouth daily for 7 days  -     XR CHEST (2 VW); Future  -     Basic Metabolic Panel; Future  -     CBC with Auto Differential; Future    Dysfunction of both eustachian tubes  -     Basic Metabolic Panel; Future  -     CBC with Auto Differential; Future        Vitals:    02/03/23 0855   BP: 102/76   Pulse: 98   Temp: 97.7 °F (36.5 °C)   SpO2: 99%       Physical Exam  Vitals reviewed. Constitutional:       General: She is not in acute distress. Appearance: She is well-developed. She is not diaphoretic. HENT:      Head: Normocephalic and atraumatic. Right Ear: Tympanic membrane, ear canal and external ear normal. There is no impacted cerumen. Left Ear: Tympanic membrane, ear canal and external ear normal. There is no impacted cerumen. Nose: Rhinorrhea present. No congestion. Mouth/Throat:      Mouth: Mucous membranes are moist.      Pharynx: Oropharynx is clear. No oropharyngeal exudate or posterior oropharyngeal erythema. Comments: Petechia on the posterior pharynx  Pulmonary:      Effort: Pulmonary effort is normal. No respiratory distress. Breath sounds: No stridor. No wheezing, rhonchi or rales. Chest:      Chest wall: No tenderness. Neurological:      Mental Status: She is alert and oriented to person, place, and time. Cranial Nerves: No cranial nerve deficit. Psychiatric:         Behavior: Behavior normal.         Thought Content:  Thought content normal.         Judgment: Judgment normal.      PHQ Scores 2/3/2023 2/3/2023 1/30/2023 11/14/2022 7/14/2022 10/27/2021 2/19/2021   PHQ2 Score 0 0 0 0 0 1 1   PHQ9 Score 0 0 0 0 0 1 1     Interpretation of Total Score Depression Severity: 1-4 = Minimal depression, 5-9 = Mild depression, 10-14 = Moderate depression, 15-19 = Moderately severe depression, 20-27 = Severe depression    MD Donita Cruz

## 2023-02-23 DIAGNOSIS — B00.2 RECURRENT ORAL HERPES SIMPLEX: ICD-10-CM

## 2023-02-24 RX ORDER — FUROSEMIDE 20 MG/1
TABLET ORAL
Qty: 90 TABLET | Refills: 1 | Status: SHIPPED | OUTPATIENT
Start: 2023-02-24

## 2023-02-24 RX ORDER — BUPROPION HYDROCHLORIDE 150 MG/1
TABLET ORAL
Qty: 270 TABLET | Refills: 1 | Status: SHIPPED | OUTPATIENT
Start: 2023-02-24

## 2023-02-24 RX ORDER — VALACYCLOVIR HYDROCHLORIDE 500 MG/1
TABLET, FILM COATED ORAL
Qty: 90 TABLET | Refills: 1 | Status: SHIPPED | OUTPATIENT
Start: 2023-02-24

## 2023-03-20 DIAGNOSIS — F41.9 ANXIETY: ICD-10-CM

## 2023-03-20 RX ORDER — ALPRAZOLAM 0.5 MG/1
TABLET ORAL
Qty: 15 TABLET | Refills: 0 | Status: SHIPPED | OUTPATIENT
Start: 2023-03-20 | End: 2023-04-19

## 2023-05-04 DIAGNOSIS — Z01.818 PREOP TESTING: ICD-10-CM

## 2023-05-04 LAB
ANION GAP SERPL CALCULATED.3IONS-SCNC: 10 MMOL/L (ref 3–16)
BASOPHILS # BLD: 0.1 K/UL (ref 0–0.2)
BASOPHILS NFR BLD: 1.4 %
BUN SERPL-MCNC: 7 MG/DL (ref 7–20)
CALCIUM SERPL-MCNC: 9.2 MG/DL (ref 8.3–10.6)
CHLORIDE SERPL-SCNC: 103 MMOL/L (ref 99–110)
CO2 SERPL-SCNC: 27 MMOL/L (ref 21–32)
CREAT SERPL-MCNC: 0.8 MG/DL (ref 0.6–1.1)
DEPRECATED RDW RBC AUTO: 12.3 % (ref 12.4–15.4)
EOSINOPHIL # BLD: 0.2 K/UL (ref 0–0.6)
EOSINOPHIL NFR BLD: 3.7 %
GFR SERPLBLD CREATININE-BSD FMLA CKD-EPI: >60 ML/MIN/{1.73_M2}
GLUCOSE SERPL-MCNC: 74 MG/DL (ref 70–99)
HCT VFR BLD AUTO: 43.9 % (ref 36–48)
HGB BLD-MCNC: 14.9 G/DL (ref 12–16)
LYMPHOCYTES # BLD: 1.8 K/UL (ref 1–5.1)
LYMPHOCYTES NFR BLD: 33.2 %
MCH RBC QN AUTO: 32.7 PG (ref 26–34)
MCHC RBC AUTO-ENTMCNC: 34.1 G/DL (ref 31–36)
MCV RBC AUTO: 96.2 FL (ref 80–100)
MONOCYTES # BLD: 0.5 K/UL (ref 0–1.3)
MONOCYTES NFR BLD: 9.5 %
NEUTROPHILS # BLD: 2.8 K/UL (ref 1.7–7.7)
NEUTROPHILS NFR BLD: 52.2 %
PLATELET # BLD AUTO: 264 K/UL (ref 135–450)
PMV BLD AUTO: 10.6 FL (ref 5–10.5)
POTASSIUM SERPL-SCNC: 4.5 MMOL/L (ref 3.5–5.1)
RBC # BLD AUTO: 4.56 M/UL (ref 4–5.2)
SODIUM SERPL-SCNC: 140 MMOL/L (ref 136–145)
WBC # BLD AUTO: 5.4 K/UL (ref 4–11)

## 2023-05-05 ENCOUNTER — OFFICE VISIT (OUTPATIENT)
Dept: INTERNAL MEDICINE CLINIC | Age: 43
End: 2023-05-05
Payer: COMMERCIAL

## 2023-05-05 VITALS
HEART RATE: 92 BPM | WEIGHT: 135 LBS | OXYGEN SATURATION: 98 % | DIASTOLIC BLOOD PRESSURE: 80 MMHG | BODY MASS INDEX: 24.69 KG/M2 | SYSTOLIC BLOOD PRESSURE: 122 MMHG | TEMPERATURE: 97.5 F

## 2023-05-05 DIAGNOSIS — F33.1 MODERATE EPISODE OF RECURRENT MAJOR DEPRESSIVE DISORDER (HCC): ICD-10-CM

## 2023-05-05 DIAGNOSIS — Z01.818 PRE-OP EXAMINATION: Primary | ICD-10-CM

## 2023-05-05 PROCEDURE — 99214 OFFICE O/P EST MOD 30 MIN: CPT | Performed by: INTERNAL MEDICINE

## 2023-05-05 ASSESSMENT — PATIENT HEALTH QUESTIONNAIRE - PHQ9
SUM OF ALL RESPONSES TO PHQ9 QUESTIONS 1 & 2: 0
4. FEELING TIRED OR HAVING LITTLE ENERGY: 0
2. FEELING DOWN, DEPRESSED OR HOPELESS: 0
SUM OF ALL RESPONSES TO PHQ QUESTIONS 1-9: 0
1. LITTLE INTEREST OR PLEASURE IN DOING THINGS: 0
SUM OF ALL RESPONSES TO PHQ QUESTIONS 1-9: 0
10. IF YOU CHECKED OFF ANY PROBLEMS, HOW DIFFICULT HAVE THESE PROBLEMS MADE IT FOR YOU TO DO YOUR WORK, TAKE CARE OF THINGS AT HOME, OR GET ALONG WITH OTHER PEOPLE: 0
7. TROUBLE CONCENTRATING ON THINGS, SUCH AS READING THE NEWSPAPER OR WATCHING TELEVISION: 0
SUM OF ALL RESPONSES TO PHQ QUESTIONS 1-9: 0
3. TROUBLE FALLING OR STAYING ASLEEP: 0
SUM OF ALL RESPONSES TO PHQ QUESTIONS 1-9: 0
8. MOVING OR SPEAKING SO SLOWLY THAT OTHER PEOPLE COULD HAVE NOTICED. OR THE OPPOSITE, BEING SO FIGETY OR RESTLESS THAT YOU HAVE BEEN MOVING AROUND A LOT MORE THAN USUAL: 0
6. FEELING BAD ABOUT YOURSELF - OR THAT YOU ARE A FAILURE OR HAVE LET YOURSELF OR YOUR FAMILY DOWN: 0
5. POOR APPETITE OR OVEREATING: 0
9. THOUGHTS THAT YOU WOULD BE BETTER OFF DEAD, OR OF HURTING YOURSELF: 0

## 2023-05-05 ASSESSMENT — ENCOUNTER SYMPTOMS
CONSTIPATION: 0
SHORTNESS OF BREATH: 0
COUGH: 0
DIARRHEA: 0
BLOOD IN STOOL: 0

## 2023-05-05 NOTE — PROGRESS NOTES
Keyur Castaneda is a 38 y.o.  female who comes for a preoperative exam.  She  is referred by Dr. Evi Vera for perioperative risk determination for upcoming surgery for laparoscopic hysterectomy and unilateral oophorectomy.       Past Medical History:   Diagnosis Date    Anxiety     Asthma     Depression     Ectopic pregnancy     Endometriosis     Headache     Kidney stones     Menorrhagia with irregular cycle      Past Surgical History:   Procedure Laterality Date    APPENDECTOMY      ECTOPIC PREGNANCY SURGERY      ENDOMETRIAL ABLATION      OTHER SURGICAL HISTORY      laser for endometriosis    URETER STENT PLACEMENT      WISDOM TOOTH EXTRACTION  1999     Family History   Problem Relation Age of Onset    High Blood Pressure Mother         DDD    High Blood Pressure Father     Breast Cancer Maternal Grandmother     Diabetes Maternal Grandmother     Arthritis Maternal Grandmother         DDD    Cancer Maternal Grandmother     High Blood Pressure Maternal Grandmother     Cancer Maternal Grandfather     Heart Disease Paternal Grandmother     Cancer Paternal Grandfather     Diabetes Paternal Grandfather     Heart Disease Paternal Grandfather     Stroke Paternal Grandfather      Social History     Socioeconomic History    Marital status:      Spouse name: Not on file    Number of children: 2    Years of education: Not on file    Highest education level: Not on file   Occupational History    Occupation: MA   Tobacco Use    Smoking status: Former     Types: Cigarettes     Quit date: 10/12/2012     Years since quitting: 10.5    Smokeless tobacco: Never   Vaping Use    Vaping Use: Former   Substance and Sexual Activity    Alcohol use: Yes     Comment: soc    Drug use: No    Sexual activity: Yes     Partners: Male   Other Topics Concern    Not on file   Social History Narrative    Not on file     Social Determinants of Health     Financial Resource Strain: Low Risk     Difficulty of Paying Living Expenses: Not hard at all

## 2023-05-19 ENCOUNTER — ANESTHESIA EVENT (OUTPATIENT)
Dept: OPERATING ROOM | Age: 43
End: 2023-05-19
Payer: COMMERCIAL

## 2023-05-19 ENCOUNTER — ANESTHESIA (OUTPATIENT)
Dept: OPERATING ROOM | Age: 43
End: 2023-05-19
Payer: COMMERCIAL

## 2023-05-19 ENCOUNTER — HOSPITAL ENCOUNTER (OUTPATIENT)
Age: 43
Setting detail: OUTPATIENT SURGERY
Discharge: HOME | End: 2023-05-19
Attending: OBSTETRICS & GYNECOLOGY | Admitting: OBSTETRICS & GYNECOLOGY
Payer: COMMERCIAL

## 2023-05-19 VITALS
TEMPERATURE: 97.1 F | BODY MASS INDEX: 24.51 KG/M2 | HEIGHT: 62 IN | OXYGEN SATURATION: 97 % | DIASTOLIC BLOOD PRESSURE: 62 MMHG | WEIGHT: 133.2 LBS | SYSTOLIC BLOOD PRESSURE: 100 MMHG | HEART RATE: 97 BPM | RESPIRATION RATE: 17 BRPM

## 2023-05-19 DIAGNOSIS — N92.0 MENORRHAGIA WITH REGULAR CYCLE: ICD-10-CM

## 2023-05-19 DIAGNOSIS — Z48.89 ENCOUNTER FOR POSTOPERATIVE CARE: ICD-10-CM

## 2023-05-19 DIAGNOSIS — Z01.818 PREOP TESTING: Primary | ICD-10-CM

## 2023-05-19 LAB — HCG UR QL: NEGATIVE

## 2023-05-19 PROCEDURE — 3600000009 HC SURGERY ROBOT BASE: Performed by: OBSTETRICS & GYNECOLOGY

## 2023-05-19 PROCEDURE — 6360000002 HC RX W HCPCS: Performed by: NURSE ANESTHETIST, CERTIFIED REGISTERED

## 2023-05-19 PROCEDURE — 3700000001 HC ADD 15 MINUTES (ANESTHESIA): Performed by: OBSTETRICS & GYNECOLOGY

## 2023-05-19 PROCEDURE — 7100000000 HC PACU RECOVERY - FIRST 15 MIN: Performed by: OBSTETRICS & GYNECOLOGY

## 2023-05-19 PROCEDURE — 6370000000 HC RX 637 (ALT 250 FOR IP): Performed by: ANESTHESIOLOGY

## 2023-05-19 PROCEDURE — 7100000011 HC PHASE II RECOVERY - ADDTL 15 MIN: Performed by: OBSTETRICS & GYNECOLOGY

## 2023-05-19 PROCEDURE — 6360000002 HC RX W HCPCS: Performed by: OBSTETRICS & GYNECOLOGY

## 2023-05-19 PROCEDURE — 2709999900 HC NON-CHARGEABLE SUPPLY: Performed by: OBSTETRICS & GYNECOLOGY

## 2023-05-19 PROCEDURE — 3600000019 HC SURGERY ROBOT ADDTL 15MIN: Performed by: OBSTETRICS & GYNECOLOGY

## 2023-05-19 PROCEDURE — 88307 TISSUE EXAM BY PATHOLOGIST: CPT

## 2023-05-19 PROCEDURE — 84703 CHORIONIC GONADOTROPIN ASSAY: CPT

## 2023-05-19 PROCEDURE — 2580000003 HC RX 258: Performed by: OBSTETRICS & GYNECOLOGY

## 2023-05-19 PROCEDURE — 3700000000 HC ANESTHESIA ATTENDED CARE: Performed by: OBSTETRICS & GYNECOLOGY

## 2023-05-19 PROCEDURE — 2580000003 HC RX 258: Performed by: NURSE ANESTHETIST, CERTIFIED REGISTERED

## 2023-05-19 PROCEDURE — S2900 ROBOTIC SURGICAL SYSTEM: HCPCS | Performed by: OBSTETRICS & GYNECOLOGY

## 2023-05-19 PROCEDURE — 7100000001 HC PACU RECOVERY - ADDTL 15 MIN: Performed by: OBSTETRICS & GYNECOLOGY

## 2023-05-19 PROCEDURE — 7100000010 HC PHASE II RECOVERY - FIRST 15 MIN: Performed by: OBSTETRICS & GYNECOLOGY

## 2023-05-19 PROCEDURE — 6360000002 HC RX W HCPCS: Performed by: ANESTHESIOLOGY

## 2023-05-19 PROCEDURE — 2500000003 HC RX 250 WO HCPCS: Performed by: NURSE ANESTHETIST, CERTIFIED REGISTERED

## 2023-05-19 RX ORDER — KETOROLAC TROMETHAMINE 30 MG/ML
30 INJECTION, SOLUTION INTRAMUSCULAR; INTRAVENOUS ONCE
Status: COMPLETED | OUTPATIENT
Start: 2023-05-19 | End: 2023-05-19

## 2023-05-19 RX ORDER — HYDROMORPHONE HCL 110MG/55ML
0.5 PATIENT CONTROLLED ANALGESIA SYRINGE INTRAVENOUS EVERY 5 MIN PRN
Status: DISCONTINUED | OUTPATIENT
Start: 2023-05-19 | End: 2023-05-19 | Stop reason: HOSPADM

## 2023-05-19 RX ORDER — OXYCODONE HYDROCHLORIDE 5 MG/1
5 TABLET ORAL EVERY 6 HOURS PRN
Qty: 8 TABLET | Refills: 0 | Status: SHIPPED | OUTPATIENT
Start: 2023-05-19 | End: 2023-05-22

## 2023-05-19 RX ORDER — DEXMEDETOMIDINE HYDROCHLORIDE 100 UG/ML
INJECTION, SOLUTION INTRAVENOUS PRN
Status: DISCONTINUED | OUTPATIENT
Start: 2023-05-19 | End: 2023-05-19 | Stop reason: SDUPTHER

## 2023-05-19 RX ORDER — SODIUM CHLORIDE 9 MG/ML
INJECTION, SOLUTION INTRAVENOUS CONTINUOUS PRN
Status: DISCONTINUED | OUTPATIENT
Start: 2023-05-19 | End: 2023-05-19 | Stop reason: SDUPTHER

## 2023-05-19 RX ORDER — PROPOFOL 10 MG/ML
INJECTION, EMULSION INTRAVENOUS PRN
Status: DISCONTINUED | OUTPATIENT
Start: 2023-05-19 | End: 2023-05-19 | Stop reason: SDUPTHER

## 2023-05-19 RX ORDER — FENTANYL CITRATE 50 UG/ML
INJECTION, SOLUTION INTRAMUSCULAR; INTRAVENOUS PRN
Status: DISCONTINUED | OUTPATIENT
Start: 2023-05-19 | End: 2023-05-19 | Stop reason: SDUPTHER

## 2023-05-19 RX ORDER — SODIUM CHLORIDE, SODIUM LACTATE, POTASSIUM CHLORIDE, CALCIUM CHLORIDE 600; 310; 30; 20 MG/100ML; MG/100ML; MG/100ML; MG/100ML
INJECTION, SOLUTION INTRAVENOUS CONTINUOUS
Status: DISCONTINUED | OUTPATIENT
Start: 2023-05-19 | End: 2023-05-19 | Stop reason: HOSPADM

## 2023-05-19 RX ORDER — OXYCODONE HYDROCHLORIDE 5 MG/1
5 TABLET ORAL ONCE
Status: COMPLETED | OUTPATIENT
Start: 2023-05-19 | End: 2023-05-19

## 2023-05-19 RX ORDER — ONDANSETRON 2 MG/ML
4 INJECTION INTRAMUSCULAR; INTRAVENOUS
Status: COMPLETED | OUTPATIENT
Start: 2023-05-19 | End: 2023-05-19

## 2023-05-19 RX ORDER — SODIUM CHLORIDE 0.9 % (FLUSH) 0.9 %
5-40 SYRINGE (ML) INJECTION EVERY 12 HOURS SCHEDULED
Status: DISCONTINUED | OUTPATIENT
Start: 2023-05-19 | End: 2023-05-19 | Stop reason: HOSPADM

## 2023-05-19 RX ORDER — LIDOCAINE HYDROCHLORIDE 20 MG/ML
INJECTION, SOLUTION EPIDURAL; INFILTRATION; INTRACAUDAL; PERINEURAL PRN
Status: DISCONTINUED | OUTPATIENT
Start: 2023-05-19 | End: 2023-05-19 | Stop reason: SDUPTHER

## 2023-05-19 RX ORDER — SODIUM CHLORIDE, SODIUM LACTATE, POTASSIUM CHLORIDE, CALCIUM CHLORIDE 600; 310; 30; 20 MG/100ML; MG/100ML; MG/100ML; MG/100ML
INJECTION, SOLUTION INTRAVENOUS CONTINUOUS PRN
Status: DISCONTINUED | OUTPATIENT
Start: 2023-05-19 | End: 2023-05-19 | Stop reason: SDUPTHER

## 2023-05-19 RX ORDER — SODIUM CHLORIDE 9 MG/ML
INJECTION, SOLUTION INTRAVENOUS PRN
Status: DISCONTINUED | OUTPATIENT
Start: 2023-05-19 | End: 2023-05-19 | Stop reason: HOSPADM

## 2023-05-19 RX ORDER — LIDOCAINE HYDROCHLORIDE 10 MG/ML
0.5 INJECTION, SOLUTION EPIDURAL; INFILTRATION; INTRACAUDAL; PERINEURAL ONCE
Status: DISCONTINUED | OUTPATIENT
Start: 2023-05-19 | End: 2023-05-19 | Stop reason: HOSPADM

## 2023-05-19 RX ORDER — GLYCOPYRROLATE 0.2 MG/ML
INJECTION INTRAMUSCULAR; INTRAVENOUS PRN
Status: DISCONTINUED | OUTPATIENT
Start: 2023-05-19 | End: 2023-05-19 | Stop reason: SDUPTHER

## 2023-05-19 RX ORDER — SODIUM CHLORIDE, SODIUM LACTATE, POTASSIUM CHLORIDE, CALCIUM CHLORIDE 600; 310; 30; 20 MG/100ML; MG/100ML; MG/100ML; MG/100ML
INJECTION, SOLUTION INTRAVENOUS CONTINUOUS PRN
Status: DISCONTINUED | OUTPATIENT
Start: 2023-05-19 | End: 2023-05-19

## 2023-05-19 RX ORDER — DEXAMETHASONE SODIUM PHOSPHATE 4 MG/ML
INJECTION, SOLUTION INTRA-ARTICULAR; INTRALESIONAL; INTRAMUSCULAR; INTRAVENOUS; SOFT TISSUE PRN
Status: DISCONTINUED | OUTPATIENT
Start: 2023-05-19 | End: 2023-05-19 | Stop reason: SDUPTHER

## 2023-05-19 RX ORDER — ONDANSETRON 2 MG/ML
INJECTION INTRAMUSCULAR; INTRAVENOUS PRN
Status: DISCONTINUED | OUTPATIENT
Start: 2023-05-19 | End: 2023-05-19 | Stop reason: SDUPTHER

## 2023-05-19 RX ORDER — VECURONIUM BROMIDE 1 MG/ML
INJECTION, POWDER, LYOPHILIZED, FOR SOLUTION INTRAVENOUS PRN
Status: DISCONTINUED | OUTPATIENT
Start: 2023-05-19 | End: 2023-05-19 | Stop reason: SDUPTHER

## 2023-05-19 RX ORDER — SUCCINYLCHOLINE/SOD CL,ISO/PF 200MG/10ML
SYRINGE (ML) INTRAVENOUS PRN
Status: DISCONTINUED | OUTPATIENT
Start: 2023-05-19 | End: 2023-05-19 | Stop reason: SDUPTHER

## 2023-05-19 RX ORDER — BUPIVACAINE HYDROCHLORIDE 2.5 MG/ML
INJECTION, SOLUTION INFILTRATION; PERINEURAL
Status: COMPLETED | OUTPATIENT
Start: 2023-05-19 | End: 2023-05-19

## 2023-05-19 RX ORDER — MEPERIDINE HYDROCHLORIDE 25 MG/ML
12.5 INJECTION INTRAMUSCULAR; INTRAVENOUS; SUBCUTANEOUS EVERY 5 MIN PRN
Status: DISCONTINUED | OUTPATIENT
Start: 2023-05-19 | End: 2023-05-19 | Stop reason: HOSPADM

## 2023-05-19 RX ORDER — MAGNESIUM SULFATE HEPTAHYDRATE 500 MG/ML
INJECTION, SOLUTION INTRAMUSCULAR; INTRAVENOUS PRN
Status: DISCONTINUED | OUTPATIENT
Start: 2023-05-19 | End: 2023-05-19 | Stop reason: SDUPTHER

## 2023-05-19 RX ORDER — KETAMINE HCL IN NACL, ISO-OSM 100MG/10ML
SYRINGE (ML) INJECTION PRN
Status: DISCONTINUED | OUTPATIENT
Start: 2023-05-19 | End: 2023-05-19 | Stop reason: SDUPTHER

## 2023-05-19 RX ORDER — SODIUM CHLORIDE 0.9 % (FLUSH) 0.9 %
5-40 SYRINGE (ML) INJECTION PRN
Status: DISCONTINUED | OUTPATIENT
Start: 2023-05-19 | End: 2023-05-19 | Stop reason: HOSPADM

## 2023-05-19 RX ORDER — PHENYLEPHRINE HCL IN 0.9% NACL 1 MG/10 ML
SYRINGE (ML) INTRAVENOUS PRN
Status: DISCONTINUED | OUTPATIENT
Start: 2023-05-19 | End: 2023-05-19 | Stop reason: SDUPTHER

## 2023-05-19 RX ORDER — MIDAZOLAM HYDROCHLORIDE 1 MG/ML
INJECTION INTRAMUSCULAR; INTRAVENOUS PRN
Status: DISCONTINUED | OUTPATIENT
Start: 2023-05-19 | End: 2023-05-19 | Stop reason: SDUPTHER

## 2023-05-19 RX ADMIN — FENTANYL CITRATE 50 MCG: 50 INJECTION, SOLUTION INTRAMUSCULAR; INTRAVENOUS at 08:54

## 2023-05-19 RX ADMIN — Medication 200 MCG: at 07:53

## 2023-05-19 RX ADMIN — Medication 20 MG: at 07:52

## 2023-05-19 RX ADMIN — KETOROLAC TROMETHAMINE 30 MG: 30 INJECTION, SOLUTION INTRAMUSCULAR at 09:42

## 2023-05-19 RX ADMIN — OXYCODONE HYDROCHLORIDE 5 MG: 5 TABLET ORAL at 11:06

## 2023-05-19 RX ADMIN — Medication 200 MCG: at 07:56

## 2023-05-19 RX ADMIN — Medication 160 MG: at 07:34

## 2023-05-19 RX ADMIN — HYDROMORPHONE HYDROCHLORIDE 0.5 MG: 2 INJECTION, SOLUTION INTRAMUSCULAR; INTRAVENOUS; SUBCUTANEOUS at 09:35

## 2023-05-19 RX ADMIN — Medication 100 MCG: at 08:27

## 2023-05-19 RX ADMIN — LIDOCAINE HYDROCHLORIDE 100 MG: 20 INJECTION, SOLUTION EPIDURAL; INFILTRATION; INTRACAUDAL; PERINEURAL at 07:34

## 2023-05-19 RX ADMIN — SODIUM CHLORIDE, POTASSIUM CHLORIDE, SODIUM LACTATE AND CALCIUM CHLORIDE: 600; 310; 30; 20 INJECTION, SOLUTION INTRAVENOUS at 07:28

## 2023-05-19 RX ADMIN — MAGNESIUM SULFATE HEPTAHYDRATE 1 G: 500 INJECTION, SOLUTION INTRAMUSCULAR; INTRAVENOUS at 07:45

## 2023-05-19 RX ADMIN — VECURONIUM BROMIDE 10 MG: 1 INJECTION, POWDER, LYOPHILIZED, FOR SOLUTION INTRAVENOUS at 07:45

## 2023-05-19 RX ADMIN — FENTANYL CITRATE 50 MCG: 50 INJECTION, SOLUTION INTRAMUSCULAR; INTRAVENOUS at 07:34

## 2023-05-19 RX ADMIN — HYDROMORPHONE HYDROCHLORIDE 0.5 MG: 2 INJECTION, SOLUTION INTRAMUSCULAR; INTRAVENOUS; SUBCUTANEOUS at 09:24

## 2023-05-19 RX ADMIN — MIDAZOLAM 2 MG: 1 INJECTION INTRAMUSCULAR; INTRAVENOUS at 07:24

## 2023-05-19 RX ADMIN — Medication 20 MG: at 08:44

## 2023-05-19 RX ADMIN — DEXMEDETOMIDINE HYDROCHLORIDE 6 MCG: 100 INJECTION, SOLUTION INTRAVENOUS at 08:45

## 2023-05-19 RX ADMIN — SODIUM CHLORIDE: 9 INJECTION, SOLUTION INTRAVENOUS at 07:28

## 2023-05-19 RX ADMIN — SUGAMMADEX 200 MG: 100 INJECTION, SOLUTION INTRAVENOUS at 08:49

## 2023-05-19 RX ADMIN — PROPOFOL 150 MG: 10 INJECTION, EMULSION INTRAVENOUS at 07:34

## 2023-05-19 RX ADMIN — ONDANSETRON 4 MG: 2 INJECTION INTRAMUSCULAR; INTRAVENOUS at 10:28

## 2023-05-19 RX ADMIN — ONDANSETRON 4 MG: 2 INJECTION INTRAMUSCULAR; INTRAVENOUS at 07:45

## 2023-05-19 RX ADMIN — DEXAMETHASONE SODIUM PHOSPHATE 8 MG: 4 INJECTION, SOLUTION INTRAMUSCULAR; INTRAVENOUS at 07:45

## 2023-05-19 RX ADMIN — Medication 100 MCG: at 08:31

## 2023-05-19 RX ADMIN — Medication 10 MG: at 08:49

## 2023-05-19 RX ADMIN — Medication 100 MCG: at 08:41

## 2023-05-19 RX ADMIN — CEFAZOLIN 2000 MG: 2 INJECTION, POWDER, FOR SOLUTION INTRAMUSCULAR; INTRAVENOUS at 07:31

## 2023-05-19 RX ADMIN — GLYCOPYRROLATE 0.2 MG: 0.2 INJECTION INTRAMUSCULAR; INTRAVENOUS at 07:53

## 2023-05-19 RX ADMIN — DEXMEDETOMIDINE HYDROCHLORIDE 10 MCG: 100 INJECTION, SOLUTION INTRAVENOUS at 08:00

## 2023-05-19 ASSESSMENT — PAIN DESCRIPTION - DESCRIPTORS
DESCRIPTORS: CRAMPING;PRESSURE
DESCRIPTORS: OTHER (COMMENT)
DESCRIPTORS: CRAMPING
DESCRIPTORS: PRESSURE

## 2023-05-19 ASSESSMENT — PAIN DESCRIPTION - LOCATION
LOCATION: ABDOMEN

## 2023-05-19 ASSESSMENT — PAIN - FUNCTIONAL ASSESSMENT: PAIN_FUNCTIONAL_ASSESSMENT: 0-10

## 2023-05-19 ASSESSMENT — PAIN SCALES - GENERAL
PAINLEVEL_OUTOF10: 9
PAINLEVEL_OUTOF10: 9
PAINLEVEL_OUTOF10: 7
PAINLEVEL_OUTOF10: 8

## 2023-05-19 NOTE — OP NOTE
Brian Fluke ring is seated properly around the cervix. The balloon is inflated with about 8 cc of saline and the uterus is evaluated for adequate mobility for the procedure. The Albright catheter is placed returning a medium amount of clear straw colored urine. Attention then is turned to the abdomen where the operative port sites are evaluated and infiltrated with approximately 3-4 ml each of .25% Marcaine. The midline port is first placed with the Verres needle and the insufflation of approximately 3 liters of CO2 gas. Under direct visualization, then the bilateral operative ports and the assistant's port are placed without incident. The abdominal and pelvic anatomy are reviewed with normal anatomy demonstrating no significant abnormalities is seen. The robot is docked in the usual fashion without complication. Starting on the left, the tube is dissected from the normal appearing mesosalpinx from the distal to proximal attachment of this tube to the uterus. The ovarian ligament is coagulated with the grasper and the ovary falls to the lateral pelvic side wall with normal vasculature maintained. The dissection is continued with the left round ligament, continues along the left by  the anterior and posterior leaves of the broad ligament and then identifying the left uterine artery and vein and incorporating the left cardinal ligament. All pedicles are secured with bipolar cautery and are noted to be hemostatic. The dissection then is performed in a similar manner on the right. Of note, an obvious \"powder-burn\" implant of endometriosis at one of three [employer] windows is easily incorporated in the dissection of the peritoneum over the right U/S ligament and this is removed with our specimen. No other implants of endometriosis are noted.   After the bilateral vascular pedicles are noted to be hemostatic, the bladder is swept clearly off the lower uterine segment and the Brian Fluke ring identifies the

## 2023-05-19 NOTE — PROGRESS NOTES
Pt awake resting in bed, VSS- on RA satting high 90s. X4 surgical incisions to abdomen CDI, soft. Fresh pad and underwear placed after patient able to void in bathroom. Pt tolerating POs- 5 mg oxycodone given to patient for pain. Family at bedside.  Phase one criteria met, will transition to phase two for discharge

## 2023-05-19 NOTE — DISCHARGE INSTRUCTIONS
film.    Do not apply liquid or ointment medications, soap, powders or lotions to the incision while the adhesive film is in place. You may shower 24 hours after your surgery and briefly wet the adhesive film. Do not sit in a tub of water or swim. Do not soak or scrub your incision. After showering, gently blot your incision dry. No tape or any type of bandage/covering is required over the adhesive film. SEDATION DISCHARGE INSTRUCTIONS    5/19/2023    Wear your seatbelt home. You are under the influence of drugs. Do not drink alcohol, drive, operate machinery, or make any important decisions or sign any legal documents for 24 hours  A responsible adult needs to be with you for 24 hours. You may experience lightheadedness, dizziness, nausea, heightened emotions and/or sleepiness following surgery. Rest at home today- increase activity as tolerated. Progress slowly to a regular diet and drink plenty of fluids unless your physician has instructed you otherwise. If nausea becomes a problem, call your physician. Coughing, sore throat, and muscle aches are other side effects of anesthesia and should improve with time. Do not drive or operate machinery while taking narcotics. ATTENTION FEMALE PATIENTS: if you use an oral contraceptive or birth control pill, you need to use an additional or alternative method for pregnancy prevention for the next thirty days. Medications given today may render your contraceptive ineffective for this cycle.

## 2023-05-19 NOTE — PROGRESS NOTES
Discussed discharge paperwork with pt, pts  and daughter- family verbalized understanding.  was able to safely get pt dressed and two PIVs were removed with no complications. Pt was wheeled to front lobby to be taken home by family. Pt sent home with extra pads and ice pack.  All questions answered at this time and number for Dr. Steele  office provided

## 2023-05-19 NOTE — DISCHARGE SUMMARY
Physician Discharge Summary     Patient ID:  Uvaldo Chacon  [id number]  60 y.o.  1980    Admit date: 5/19/2023    Discharge date:      Admitting Physician: Perri Ospina MD    Discharge Diagnoses: Menorrhagia with regular cycle [N92.0]    Discharged Condition: STABLE    Procedures Performed: ROBOTIC LAPAROSCOPIC ASSISTED VAGINAL HYSTERECTOMY WITH BILATERAL SALPINGECTOMY WITH RESECTION OF IMPLANTS OF ENDOMETRIOSIS AND LYSIS OF ADHESIONS. Hospital Course: Patient was admitted on the day of surgery, and underwent an uncomplicated procedure. See dictated op note. Discharge Exam:  Appears well, AVSS, abdomen soft, appropriately tender, nondistended, BS present, incisions clean dry, intact    Disposition: Good    Patient Instructions:    Activity: ambulate in house, no lifting or strenuous exercise for 6 weeks, no sex for 6-8 weeks and no driving while on analgesics    Diet: Regular    Wound Care: Keep wound clean and dry    Discharge Medication:      Medication List        CHANGE how you take these medications      Rybelsus 7 MG Tabs  Generic drug: Semaglutide  TAKE 1 TABLET BY MOUTH ONE TIME A DAY  What changed:   how much to take  how to take this  when to take this  additional instructions            CONTINUE taking these medications      albuterol sulfate  (90 Base) MCG/ACT inhaler  Commonly known as: Proventil HFA  Inhale 1 puff into the lungs every 4 hours as needed for Wheezing or Shortness of Breath     buPROPion 150 MG extended release tablet  Commonly known as: WELLBUTRIN XL  TAKE THREE TABLETS BY MOUTH EVERY MORNING AS DIRECTED     esomeprazole 20 MG delayed release capsule  Commonly known as: NexIUM  Take 1 capsule by mouth daily     furosemide 20 MG tablet  Commonly known as: LASIX  TAKE 1 TABLET BY MOUTH ONE TIME A DAY     ondansetron 4 MG tablet  Commonly known as: ZOFRAN  Take 1 tablet by mouth daily as needed for Nausea or Vomiting     rizatriptan 10 MG tablet  Commonly known as:

## 2023-05-19 NOTE — H&P
Update History & Physical    The patient's History and Physical of May 5, Per Nery Mendoza MD was reviewed with the patient and I examined the patient. There was no change. The surgical site was confirmed by the patient and me. Plan: The risks, benefits, expected outcome, and alternative to the recommended procedure have been discussed with the patient. Patient understands and wants to proceed with the procedure.      Electronically signed by Cornelia Espinoza MD on 5/19/2023 at 7:19 AM

## 2023-05-19 NOTE — ANESTHESIA PRE PROCEDURE
Department of Anesthesiology  Preprocedure Note       Name:  Junito Williamson   Age:  38 y.o.  :  1980                                          MRN:  [id number]         Date:  2023      Surgeon: Alex Huston):  Aris Santos MD    Procedure: Procedure(s):  ROBOTIC LAPAROSCOPIC ASSISTED VAGINAL HYSTERECTOMY WITH BILATERAL SALPINGECTOMY AND POSSIBLE UNILATERAL OOPHORECTOMY    Medications prior to admission:   Prior to Admission medications    Medication Sig Start Date End Date Taking?  Authorizing Provider   valACYclovir (VALTREX) 500 MG tablet TAKE 1 TABLET BY MOUTH ONE TIME A DAY 23   Anitha Arreola MD   furosemide (LASIX) 20 MG tablet TAKE 1 TABLET BY MOUTH ONE TIME A DAY 23   Anitha Arreola MD   buPROPion (WELLBUTRIN XL) 150 MG extended release tablet TAKE THREE TABLETS BY MOUTH EVERY MORNING AS DIRECTED 23   Anitha Arreola MD   esomeprazole ([address]) 20 MG delayed release capsule Take 1 capsule by mouth daily 23   Anitha Arreola MD   RYBELSUS 7 MG TABS TAKE 1 TABLET BY MOUTH ONE TIME A DAY  Patient taking differently: Patient taking for wgt loss 22   Anitha Arreola MD   ondansetron (ZOFRAN) 4 MG tablet Take 1 tablet by mouth daily as needed for Nausea or Vomiting 10/5/22   Andreas Garcia, APRN - CNP   rizatriptan (MAXALT) 10 MG tablet 1 at onset May repeat in 2 hours x2 within 24 hrs if needed for HA 22   Anitha Arreola MD   albuterol sulfate HFA (PROVENTIL HFA) 108 (90 Base) MCG/ACT inhaler Inhale 1 puff into the lungs every 4 hours as needed for Wheezing or Shortness of Breath 19   Anitha Arreola MD       Current medications:    Current Facility-Administered Medications   Medication Dose Route Frequency Provider Last Rate Last Admin    lactated ringers IV soln infusion   IntraVENous Continuous Aris Santos MD        lidocaine PF 1 % injection 0.5 mL  0.5 mL IntraDERmal Once Aris Santos MD        ceFAZolin (ANCEF) 2,000 mg in sodium chloride 0.9 % 50

## 2023-05-19 NOTE — ANESTHESIA POSTPROCEDURE EVALUATION
Department of Anesthesiology  Postprocedure Note    Patient: Armen Darling  MRN: [id number]  YOB: 1980  Date of evaluation: 5/19/2023      Procedure Summary     Date: 05/19/23 Room / Location: [facility] OR [address] [place] [address]    Anesthesia Start: 4078 Anesthesia Stop: 9366    Procedure: ROBOTIC LAPAROSCOPIC ASSISTED VAGINAL HYSTERECTOMY WITH BILATERAL SALPINGECTOMY (Bilateral: Abdomen) Diagnosis:       Menorrhagia with regular cycle      (Menorrhagia with regular cycle [N92.0])    Surgeons: Greg Posada MD Responsible Provider: Radha Pickard MD    Anesthesia Type: general ASA Status: 2          Anesthesia Type: No value filed.     Vanessa Phase I:      Vanessa Phase II:        Anesthesia Post Evaluation    Patient location during evaluation: PACU  Patient participation: complete - patient participated  Level of consciousness: awake  Airway patency: patent  Nausea & Vomiting: no vomiting and no nausea  Complications: no  Cardiovascular status: hemodynamically stable  Respiratory status: acceptable  Hydration status: stable  Multimodal analgesia pain management approach

## 2023-05-19 NOTE — PROGRESS NOTES
Pt to PACU from OR, arouses to pain. VSS- on 6L simple mask satting 100%. NSR on monitor. X4 surgical incisions to abdomen CDI, ice applied.  Will continue to monitor

## 2023-07-17 ENCOUNTER — NURSE ONLY (OUTPATIENT)
Dept: INTERNAL MEDICINE CLINIC | Age: 43
End: 2023-07-17
Payer: COMMERCIAL

## 2023-07-17 DIAGNOSIS — G43.001 MIGRAINE WITHOUT AURA AND WITH STATUS MIGRAINOSUS, NOT INTRACTABLE: Primary | ICD-10-CM

## 2023-07-17 DIAGNOSIS — G43.519 INTRACTABLE PERSISTENT MIGRAINE AURA WITHOUT CEREBRAL INFARCTION AND WITHOUT STATUS MIGRAINOSUS: Primary | ICD-10-CM

## 2023-07-17 PROCEDURE — 96372 THER/PROPH/DIAG INJ SC/IM: CPT | Performed by: INTERNAL MEDICINE

## 2023-07-17 RX ORDER — UBROGEPANT 100 MG/1
1 TABLET ORAL 2 TIMES DAILY PRN
Qty: 16 TABLET | Refills: 0 | Status: SHIPPED | OUTPATIENT
Start: 2023-07-17 | End: 2023-09-06 | Stop reason: ALTCHOICE

## 2023-07-17 RX ORDER — KETOROLAC TROMETHAMINE 30 MG/ML
60 INJECTION, SOLUTION INTRAMUSCULAR; INTRAVENOUS ONCE
Status: COMPLETED | OUTPATIENT
Start: 2023-07-17 | End: 2023-07-17

## 2023-07-17 RX ADMIN — KETOROLAC TROMETHAMINE 60 MG: 30 INJECTION, SOLUTION INTRAMUSCULAR; INTRAVENOUS at 12:18

## 2023-08-03 DIAGNOSIS — F41.9 ANXIETY: ICD-10-CM

## 2023-08-03 RX ORDER — ALPRAZOLAM 0.5 MG/1
TABLET ORAL
Qty: 15 TABLET | Refills: 0 | Status: SHIPPED | OUTPATIENT
Start: 2023-08-03 | End: 2023-09-02

## 2023-08-03 NOTE — TELEPHONE ENCOUNTER
Recent Visits  Date Type Provider Dept   05/05/23 Office Visit Emily Dennison MD [facility]   02/03/23 Office Visit Emily Dennison MD [facility]   11/14/22 Office Visit Emily Dennison MD [facility]   09/14/22 Office Visit Emily Dennison MD [facility]   Showing recent visits within past 540 days with a meds authorizing provider and meeting all other requirements  Future Appointments  No visits were found meeting these conditions.   Showing future appointments within next 150 days with a meds authorizing provider and meeting all other requirements     5/5/2023

## 2023-08-14 RX ORDER — TOPIRAMATE 25 MG/1
TABLET ORAL
Qty: 60 TABLET | Refills: 3 | OUTPATIENT
Start: 2023-08-14

## 2023-08-24 ENCOUNTER — OFFICE VISIT (OUTPATIENT)
Dept: INTERNAL MEDICINE CLINIC | Age: 43
End: 2023-08-24
Payer: COMMERCIAL

## 2023-08-24 VITALS
HEIGHT: 62 IN | BODY MASS INDEX: 24.84 KG/M2 | SYSTOLIC BLOOD PRESSURE: 118 MMHG | OXYGEN SATURATION: 99 % | HEART RATE: 74 BPM | WEIGHT: 135 LBS | DIASTOLIC BLOOD PRESSURE: 80 MMHG

## 2023-08-24 DIAGNOSIS — G43.519 INTRACTABLE PERSISTENT MIGRAINE AURA WITHOUT CEREBRAL INFARCTION AND WITHOUT STATUS MIGRAINOSUS: Primary | ICD-10-CM

## 2023-08-24 PROCEDURE — 99213 OFFICE O/P EST LOW 20 MIN: CPT | Performed by: NURSE PRACTITIONER

## 2023-08-24 PROCEDURE — 96372 THER/PROPH/DIAG INJ SC/IM: CPT | Performed by: NURSE PRACTITIONER

## 2023-08-24 RX ORDER — ONDANSETRON 4 MG/1
4 TABLET, FILM COATED ORAL DAILY PRN
Qty: 30 TABLET | Refills: 0 | Status: SHIPPED | OUTPATIENT
Start: 2023-08-24

## 2023-08-24 RX ORDER — KETOROLAC TROMETHAMINE 30 MG/ML
60 INJECTION, SOLUTION INTRAMUSCULAR; INTRAVENOUS ONCE
Status: COMPLETED | OUTPATIENT
Start: 2023-08-24 | End: 2023-08-24

## 2023-08-24 RX ADMIN — KETOROLAC TROMETHAMINE 60 MG: 30 INJECTION, SOLUTION INTRAMUSCULAR; INTRAVENOUS at 17:11

## 2023-08-24 ASSESSMENT — ENCOUNTER SYMPTOMS
NAUSEA: 1
PHOTOPHOBIA: 1
RESPIRATORY NEGATIVE: 1

## 2023-08-24 NOTE — PROGRESS NOTES
SUBJECTIVE:    Patient ID: Idalmis Fulton is a 37 y.o. female. CC: migraine    HPI: The patient presents to the office for an acute visit. Patient has a history of migraine headaches. Last month, she had a series of migraines which were intolerable. She was placed on new medication to prevent this but this was not covered by her insurance. Has been having intermittent migraines, likely migraine cycle. These are her typical headaches but occurring more frequently. She has associated light sensitivity. She has associated nausea. She has been using [place] which was working. Unfortunately, she has taken 3 doses of Ubrelvy over the past 2 days without improvement of her migraine. Topamax for migraine prevention but this caused facial twitching. She typically gets relief with Toradol and nausea medication. She took Zofran at home. Current Outpatient Medications   Medication Sig Dispense Refill    ALPRAZolam (XANAX) 0.5 MG tablet TAKE 1 TABLET BY MOUTH THREE TIMES DAILY FOR UP TO 31 DAYS AS NEEDED FOR SLEEP 15 tablet 0    Ubrogepant (UBRELVY) 100 MG TABS Take 1 tablet by mouth 2 times daily as needed (Headache) 16 tablet 0    valACYclovir (VALTREX) 500 MG tablet TAKE 1 TABLET BY MOUTH ONE TIME A DAY 90 tablet 1    furosemide (LASIX) 20 MG tablet TAKE 1 TABLET BY MOUTH ONE TIME A DAY 90 tablet 1    buPROPion (WELLBUTRIN XL) 150 MG extended release tablet TAKE THREE TABLETS BY MOUTH EVERY MORNING AS DIRECTED 270 tablet 1    esomeprazole (NEXIUM) 20 MG delayed release capsule Take 1 capsule by mouth daily 90 capsule 3    RYBELSUS 7 MG TABS TAKE 1 TABLET BY MOUTH ONE TIME A DAY (Patient taking differently: Patient taking for wgt loss) 90 tablet 1    albuterol sulfate HFA (PROVENTIL HFA) 108 (90 Base) MCG/ACT inhaler Inhale 1 puff into the lungs every 4 hours as needed for Wheezing or Shortness of Breath 1 Inhaler 5     No current facility-administered medications for this visit.           Review

## 2023-08-28 ENCOUNTER — HOSPITAL ENCOUNTER (INPATIENT)
Age: 43
LOS: 2 days | Discharge: HOME | DRG: 760 | End: 2023-08-30
Attending: STUDENT IN AN ORGANIZED HEALTH CARE EDUCATION/TRAINING PROGRAM | Admitting: STUDENT IN AN ORGANIZED HEALTH CARE EDUCATION/TRAINING PROGRAM
Payer: COMMERCIAL

## 2023-08-28 ENCOUNTER — APPOINTMENT (OUTPATIENT)
Dept: ULTRASOUND IMAGING | Age: 43
DRG: 760 | End: 2023-08-28
Payer: COMMERCIAL

## 2023-08-28 ENCOUNTER — APPOINTMENT (OUTPATIENT)
Dept: CT IMAGING | Age: 43
DRG: 760 | End: 2023-08-28
Payer: COMMERCIAL

## 2023-08-28 DIAGNOSIS — N83.209 HEMORRHAGIC OVARIAN CYST: Primary | ICD-10-CM

## 2023-08-28 DIAGNOSIS — R10.9 INTRACTABLE ABDOMINAL PAIN: ICD-10-CM

## 2023-08-28 PROBLEM — F39 MOOD DISORDER (HCC): Status: ACTIVE | Noted: 2023-08-28

## 2023-08-28 PROBLEM — N39.0 UTI (URINARY TRACT INFECTION): Status: ACTIVE | Noted: 2023-08-28

## 2023-08-28 LAB
ALBUMIN SERPL-MCNC: 3.8 G/DL (ref 3.4–5)
ALBUMIN/GLOB SERPL: 1.4 {RATIO} (ref 1.1–2.2)
ALP SERPL-CCNC: 58 U/L (ref 40–129)
ALT SERPL-CCNC: 12 U/L (ref 10–40)
ANION GAP SERPL CALCULATED.3IONS-SCNC: 11 MMOL/L (ref 3–16)
AST SERPL-CCNC: 12 U/L (ref 15–37)
BACTERIA GENITAL QL WET PREP: NORMAL
BACTERIA URNS QL MICRO: ABNORMAL /HPF
BASOPHILS # BLD: 0.1 K/UL (ref 0–0.2)
BASOPHILS NFR BLD: 0.6 %
BILIRUB SERPL-MCNC: 0.4 MG/DL (ref 0–1)
BILIRUB UR QL STRIP.AUTO: NEGATIVE
BUN SERPL-MCNC: 8 MG/DL (ref 7–20)
CALCIUM SERPL-MCNC: 8.4 MG/DL (ref 8.3–10.6)
CHLORIDE SERPL-SCNC: 105 MMOL/L (ref 99–110)
CLARITY UR: ABNORMAL
CLUE CELLS SPEC QL WET PREP: NORMAL
CO2 SERPL-SCNC: 23 MMOL/L (ref 21–32)
COLOR UR: YELLOW
CREAT SERPL-MCNC: 0.7 MG/DL (ref 0.6–1.1)
DEPRECATED RDW RBC AUTO: 12.7 % (ref 12.4–15.4)
EOSINOPHIL # BLD: 0.2 K/UL (ref 0–0.6)
EOSINOPHIL NFR BLD: 2.4 %
EPI CELLS #/AREA URNS AUTO: 5 /HPF (ref 0–5)
EPI CELLS SPEC QL WET PREP: NORMAL
GFR SERPLBLD CREATININE-BSD FMLA CKD-EPI: >60 ML/MIN/{1.73_M2}
GLUCOSE SERPL-MCNC: 81 MG/DL (ref 70–99)
GLUCOSE UR STRIP.AUTO-MCNC: NEGATIVE MG/DL
HCT VFR BLD AUTO: 39.4 % (ref 36–48)
HGB BLD-MCNC: 13.3 G/DL (ref 12–16)
HGB UR QL STRIP.AUTO: ABNORMAL
HYALINE CASTS #/AREA URNS AUTO: 0 /LPF (ref 0–8)
KETONES UR STRIP.AUTO-MCNC: ABNORMAL MG/DL
LEUKOCYTE ESTERASE UR QL STRIP.AUTO: ABNORMAL
LIPASE SERPL-CCNC: 27 U/L (ref 13–60)
LYMPHOCYTES # BLD: 1.4 K/UL (ref 1–5.1)
LYMPHOCYTES NFR BLD: 15 %
MCH RBC QN AUTO: 32.4 PG (ref 26–34)
MCHC RBC AUTO-ENTMCNC: 33.8 G/DL (ref 31–36)
MCV RBC AUTO: 96 FL (ref 80–100)
MONOCYTES # BLD: 0.6 K/UL (ref 0–1.3)
MONOCYTES NFR BLD: 6.7 %
NEUTROPHILS # BLD: 6.9 K/UL (ref 1.7–7.7)
NEUTROPHILS NFR BLD: 75.3 %
NITRITE UR QL STRIP.AUTO: NEGATIVE
PH UR STRIP.AUTO: 6.5 [PH] (ref 5–8)
PLATELET # BLD AUTO: 228 K/UL (ref 135–450)
PMV BLD AUTO: 9.6 FL (ref 5–10.5)
POTASSIUM SERPL-SCNC: 3.7 MMOL/L (ref 3.5–5.1)
PROT SERPL-MCNC: 6.5 G/DL (ref 6.4–8.2)
PROT UR STRIP.AUTO-MCNC: NEGATIVE MG/DL
RBC # BLD AUTO: 4.1 M/UL (ref 4–5.2)
RBC CLUMPS #/AREA URNS AUTO: 1 /HPF (ref 0–4)
RBC SPEC QL WET PREP: NORMAL
SODIUM SERPL-SCNC: 139 MMOL/L (ref 136–145)
SP GR UR STRIP.AUTO: 1.02 (ref 1–1.03)
SPECIMEN SOURCE FLD: NORMAL
T VAGINALIS GENITAL QL WET PREP: NORMAL
UA COMPLETE W REFLEX CULTURE PNL UR: YES
UA DIPSTICK W REFLEX MICRO PNL UR: YES
URN SPEC COLLECT METH UR: ABNORMAL
UROBILINOGEN UR STRIP-ACNC: 0.2 E.U./DL
WBC # BLD AUTO: 9.2 K/UL (ref 4–11)
WBC #/AREA URNS AUTO: 15 /HPF (ref 0–5)
WBC SPEC QL WET PREP: NORMAL
YEAST GENITAL QL WET PREP: NORMAL

## 2023-08-28 PROCEDURE — 36415 COLL VENOUS BLD VENIPUNCTURE: CPT

## 2023-08-28 PROCEDURE — 2580000003 HC RX 258: Performed by: PHYSICIAN ASSISTANT

## 2023-08-28 PROCEDURE — 96376 TX/PRO/DX INJ SAME DRUG ADON: CPT

## 2023-08-28 PROCEDURE — 1200000000 HC SEMI PRIVATE

## 2023-08-28 PROCEDURE — 99285 EMERGENCY DEPT VISIT HI MDM: CPT

## 2023-08-28 PROCEDURE — 6360000002 HC RX W HCPCS: Performed by: INTERNAL MEDICINE

## 2023-08-28 PROCEDURE — 80053 COMPREHEN METABOLIC PANEL: CPT

## 2023-08-28 PROCEDURE — 6360000004 HC RX CONTRAST MEDICATION: Performed by: PHYSICIAN ASSISTANT

## 2023-08-28 PROCEDURE — 6360000002 HC RX W HCPCS: Performed by: PHYSICIAN ASSISTANT

## 2023-08-28 PROCEDURE — 87086 URINE CULTURE/COLONY COUNT: CPT

## 2023-08-28 PROCEDURE — 81001 URINALYSIS AUTO W/SCOPE: CPT

## 2023-08-28 PROCEDURE — 96375 TX/PRO/DX INJ NEW DRUG ADDON: CPT

## 2023-08-28 PROCEDURE — 85025 COMPLETE CBC W/AUTO DIFF WBC: CPT

## 2023-08-28 PROCEDURE — 2580000003 HC RX 258: Performed by: INTERNAL MEDICINE

## 2023-08-28 PROCEDURE — 74177 CT ABD & PELVIS W/CONTRAST: CPT

## 2023-08-28 PROCEDURE — 93975 VASCULAR STUDY: CPT

## 2023-08-28 PROCEDURE — 87210 SMEAR WET MOUNT SALINE/INK: CPT

## 2023-08-28 PROCEDURE — 76830 TRANSVAGINAL US NON-OB: CPT

## 2023-08-28 PROCEDURE — 2580000003 HC RX 258: Performed by: STUDENT IN AN ORGANIZED HEALTH CARE EDUCATION/TRAINING PROGRAM

## 2023-08-28 PROCEDURE — 6370000000 HC RX 637 (ALT 250 FOR IP): Performed by: STUDENT IN AN ORGANIZED HEALTH CARE EDUCATION/TRAINING PROGRAM

## 2023-08-28 PROCEDURE — 87591 N.GONORRHOEAE DNA AMP PROB: CPT

## 2023-08-28 PROCEDURE — 84443 ASSAY THYROID STIM HORMONE: CPT

## 2023-08-28 PROCEDURE — 87491 CHLMYD TRACH DNA AMP PROBE: CPT

## 2023-08-28 PROCEDURE — 83690 ASSAY OF LIPASE: CPT

## 2023-08-28 PROCEDURE — 96374 THER/PROPH/DIAG INJ IV PUSH: CPT

## 2023-08-28 PROCEDURE — 6360000002 HC RX W HCPCS: Performed by: STUDENT IN AN ORGANIZED HEALTH CARE EDUCATION/TRAINING PROGRAM

## 2023-08-28 RX ORDER — ONDANSETRON 2 MG/ML
4 INJECTION INTRAMUSCULAR; INTRAVENOUS ONCE
Status: COMPLETED | OUTPATIENT
Start: 2023-08-28 | End: 2023-08-28

## 2023-08-28 RX ORDER — 0.9 % SODIUM CHLORIDE 0.9 %
1000 INTRAVENOUS SOLUTION INTRAVENOUS ONCE
Status: COMPLETED | OUTPATIENT
Start: 2023-08-28 | End: 2023-08-28

## 2023-08-28 RX ORDER — KETOROLAC TROMETHAMINE 30 MG/ML
15 INJECTION, SOLUTION INTRAMUSCULAR; INTRAVENOUS ONCE
Status: COMPLETED | OUTPATIENT
Start: 2023-08-28 | End: 2023-08-28

## 2023-08-28 RX ORDER — BUPROPION HYDROCHLORIDE 150 MG/1
300 TABLET ORAL EVERY MORNING
Status: DISCONTINUED | OUTPATIENT
Start: 2023-08-29 | End: 2023-08-30 | Stop reason: HOSPADM

## 2023-08-28 RX ORDER — HYDROMORPHONE HYDROCHLORIDE 1 MG/ML
1 INJECTION, SOLUTION INTRAMUSCULAR; INTRAVENOUS; SUBCUTANEOUS ONCE
Status: COMPLETED | OUTPATIENT
Start: 2023-08-28 | End: 2023-08-28

## 2023-08-28 RX ORDER — ACETAMINOPHEN 650 MG/1
650 SUPPOSITORY RECTAL EVERY 6 HOURS PRN
Status: DISCONTINUED | OUTPATIENT
Start: 2023-08-28 | End: 2023-08-30 | Stop reason: HOSPADM

## 2023-08-28 RX ORDER — MORPHINE SULFATE 4 MG/ML
4 INJECTION, SOLUTION INTRAMUSCULAR; INTRAVENOUS EVERY 30 MIN PRN
Status: DISCONTINUED | OUTPATIENT
Start: 2023-08-28 | End: 2023-08-29

## 2023-08-28 RX ORDER — SODIUM CHLORIDE 0.9 % (FLUSH) 0.9 %
5-40 SYRINGE (ML) INJECTION PRN
Status: DISCONTINUED | OUTPATIENT
Start: 2023-08-28 | End: 2023-08-30 | Stop reason: HOSPADM

## 2023-08-28 RX ORDER — ONDANSETRON 2 MG/ML
4 INJECTION INTRAMUSCULAR; INTRAVENOUS EVERY 6 HOURS PRN
Status: DISCONTINUED | OUTPATIENT
Start: 2023-08-28 | End: 2023-08-30 | Stop reason: HOSPADM

## 2023-08-28 RX ORDER — PANTOPRAZOLE SODIUM 40 MG/1
40 TABLET, DELAYED RELEASE ORAL
Status: DISCONTINUED | OUTPATIENT
Start: 2023-08-28 | End: 2023-08-30 | Stop reason: HOSPADM

## 2023-08-28 RX ORDER — ACETAMINOPHEN 325 MG/1
650 TABLET ORAL EVERY 6 HOURS PRN
Status: DISCONTINUED | OUTPATIENT
Start: 2023-08-28 | End: 2023-08-30 | Stop reason: HOSPADM

## 2023-08-28 RX ORDER — SODIUM CHLORIDE, SODIUM LACTATE, POTASSIUM CHLORIDE, CALCIUM CHLORIDE 600; 310; 30; 20 MG/100ML; MG/100ML; MG/100ML; MG/100ML
INJECTION, SOLUTION INTRAVENOUS CONTINUOUS
Status: DISCONTINUED | OUTPATIENT
Start: 2023-08-29 | End: 2023-08-30

## 2023-08-28 RX ORDER — FUROSEMIDE 20 MG/1
20 TABLET ORAL DAILY
Status: DISCONTINUED | OUTPATIENT
Start: 2023-08-28 | End: 2023-08-30

## 2023-08-28 RX ORDER — ONDANSETRON 2 MG/ML
4 INJECTION INTRAMUSCULAR; INTRAVENOUS EVERY 30 MIN PRN
Status: COMPLETED | OUTPATIENT
Start: 2023-08-28 | End: 2023-08-28

## 2023-08-28 RX ORDER — ALPRAZOLAM 0.5 MG/1
0.5 TABLET ORAL 3 TIMES DAILY PRN
Status: DISCONTINUED | OUTPATIENT
Start: 2023-08-28 | End: 2023-08-30 | Stop reason: HOSPADM

## 2023-08-28 RX ORDER — ALBUTEROL SULFATE 90 UG/1
1 AEROSOL, METERED RESPIRATORY (INHALATION) EVERY 4 HOURS PRN
Status: DISCONTINUED | OUTPATIENT
Start: 2023-08-28 | End: 2023-08-30 | Stop reason: HOSPADM

## 2023-08-28 RX ORDER — MAGNESIUM SULFATE IN WATER 40 MG/ML
2000 INJECTION, SOLUTION INTRAVENOUS PRN
Status: DISCONTINUED | OUTPATIENT
Start: 2023-08-28 | End: 2023-08-30 | Stop reason: HOSPADM

## 2023-08-28 RX ORDER — SODIUM CHLORIDE 0.9 % (FLUSH) 0.9 %
5-40 SYRINGE (ML) INJECTION EVERY 12 HOURS SCHEDULED
Status: DISCONTINUED | OUTPATIENT
Start: 2023-08-28 | End: 2023-08-30 | Stop reason: HOSPADM

## 2023-08-28 RX ORDER — ONDANSETRON 4 MG/1
4 TABLET, ORALLY DISINTEGRATING ORAL EVERY 8 HOURS PRN
Status: DISCONTINUED | OUTPATIENT
Start: 2023-08-28 | End: 2023-08-30 | Stop reason: HOSPADM

## 2023-08-28 RX ORDER — HYDROMORPHONE HYDROCHLORIDE 1 MG/ML
1 INJECTION, SOLUTION INTRAMUSCULAR; INTRAVENOUS; SUBCUTANEOUS
Status: DISCONTINUED | OUTPATIENT
Start: 2023-08-28 | End: 2023-08-30 | Stop reason: HOSPADM

## 2023-08-28 RX ORDER — POTASSIUM CHLORIDE 20 MEQ/1
40 TABLET, EXTENDED RELEASE ORAL PRN
Status: DISCONTINUED | OUTPATIENT
Start: 2023-08-28 | End: 2023-08-29

## 2023-08-28 RX ORDER — POLYETHYLENE GLYCOL 3350 17 G/17G
17 POWDER, FOR SOLUTION ORAL DAILY PRN
Status: DISCONTINUED | OUTPATIENT
Start: 2023-08-28 | End: 2023-08-30 | Stop reason: HOSPADM

## 2023-08-28 RX ORDER — SODIUM CHLORIDE 9 MG/ML
INJECTION, SOLUTION INTRAVENOUS PRN
Status: DISCONTINUED | OUTPATIENT
Start: 2023-08-28 | End: 2023-08-30 | Stop reason: HOSPADM

## 2023-08-28 RX ORDER — POTASSIUM CHLORIDE 7.45 MG/ML
10 INJECTION INTRAVENOUS PRN
Status: DISCONTINUED | OUTPATIENT
Start: 2023-08-28 | End: 2023-08-29

## 2023-08-28 RX ADMIN — HYDROMORPHONE HYDROCHLORIDE 1 MG: 1 INJECTION, SOLUTION INTRAMUSCULAR; INTRAVENOUS; SUBCUTANEOUS at 18:47

## 2023-08-28 RX ADMIN — ONDANSETRON 4 MG: 2 INJECTION INTRAMUSCULAR; INTRAVENOUS at 11:27

## 2023-08-28 RX ADMIN — ONDANSETRON 4 MG: 2 INJECTION INTRAMUSCULAR; INTRAVENOUS at 14:42

## 2023-08-28 RX ADMIN — PROMETHAZINE HYDROCHLORIDE 6.25 MG: 25 INJECTION INTRAMUSCULAR; INTRAVENOUS at 19:14

## 2023-08-28 RX ADMIN — SODIUM CHLORIDE, POTASSIUM CHLORIDE, SODIUM LACTATE AND CALCIUM CHLORIDE: 600; 310; 30; 20 INJECTION, SOLUTION INTRAVENOUS at 23:58

## 2023-08-28 RX ADMIN — MORPHINE SULFATE 4 MG: 4 INJECTION, SOLUTION INTRAMUSCULAR; INTRAVENOUS at 09:36

## 2023-08-28 RX ADMIN — CEFTRIAXONE SODIUM 1000 MG: 1 INJECTION, POWDER, FOR SOLUTION INTRAMUSCULAR; INTRAVENOUS at 20:11

## 2023-08-28 RX ADMIN — HYDROMORPHONE HYDROCHLORIDE 1 MG: 1 INJECTION, SOLUTION INTRAMUSCULAR; INTRAVENOUS; SUBCUTANEOUS at 22:40

## 2023-08-28 RX ADMIN — KETOROLAC TROMETHAMINE 15 MG: 30 INJECTION, SOLUTION INTRAMUSCULAR; INTRAVENOUS at 19:11

## 2023-08-28 RX ADMIN — SODIUM CHLORIDE 1000 ML: 9 INJECTION, SOLUTION INTRAVENOUS at 11:25

## 2023-08-28 RX ADMIN — PANTOPRAZOLE SODIUM 40 MG: 40 TABLET, DELAYED RELEASE ORAL at 20:14

## 2023-08-28 RX ADMIN — HYDROMORPHONE HYDROCHLORIDE 1 MG: 1 INJECTION, SOLUTION INTRAMUSCULAR; INTRAVENOUS; SUBCUTANEOUS at 12:19

## 2023-08-28 RX ADMIN — ONDANSETRON 4 MG: 2 INJECTION INTRAMUSCULAR; INTRAVENOUS at 09:36

## 2023-08-28 RX ADMIN — ONDANSETRON 4 MG: 2 INJECTION INTRAMUSCULAR; INTRAVENOUS at 22:40

## 2023-08-28 RX ADMIN — MORPHINE SULFATE 4 MG: 4 INJECTION, SOLUTION INTRAMUSCULAR; INTRAVENOUS at 11:27

## 2023-08-28 RX ADMIN — HYDROMORPHONE HYDROCHLORIDE 1 MG: 1 INJECTION, SOLUTION INTRAMUSCULAR; INTRAVENOUS; SUBCUTANEOUS at 15:07

## 2023-08-28 RX ADMIN — IOPAMIDOL 75 ML: 755 INJECTION, SOLUTION INTRAVENOUS at 09:24

## 2023-08-28 RX ADMIN — SODIUM CHLORIDE 1000 ML: 9 INJECTION, SOLUTION INTRAVENOUS at 13:37

## 2023-08-28 ASSESSMENT — PAIN DESCRIPTION - LOCATION
LOCATION: ABDOMEN
LOCATION: PELVIS
LOCATION: PELVIS
LOCATION: ABDOMEN

## 2023-08-28 ASSESSMENT — PAIN DESCRIPTION - DESCRIPTORS
DESCRIPTORS: ACHING;DISCOMFORT;CRAMPING
DESCRIPTORS: ACHING;DISCOMFORT;STABBING
DESCRIPTORS: SHARP
DESCRIPTORS: STABBING
DESCRIPTORS: SHARP

## 2023-08-28 ASSESSMENT — PAIN - FUNCTIONAL ASSESSMENT
PAIN_FUNCTIONAL_ASSESSMENT: PREVENTS OR INTERFERES SOME ACTIVE ACTIVITIES AND ADLS
PAIN_FUNCTIONAL_ASSESSMENT: PREVENTS OR INTERFERES SOME ACTIVE ACTIVITIES AND ADLS

## 2023-08-28 ASSESSMENT — PAIN SCALES - GENERAL
PAINLEVEL_OUTOF10: 7
PAINLEVEL_OUTOF10: 10
PAINLEVEL_OUTOF10: 6
PAINLEVEL_OUTOF10: 7
PAINLEVEL_OUTOF10: 7
PAINLEVEL_OUTOF10: 6
PAINLEVEL_OUTOF10: 10
PAINLEVEL_OUTOF10: 6
PAINLEVEL_OUTOF10: 8
PAINLEVEL_OUTOF10: 9

## 2023-08-28 ASSESSMENT — PAIN DESCRIPTION - ONSET: ONSET: ON-GOING

## 2023-08-28 ASSESSMENT — PAIN DESCRIPTION - PAIN TYPE: TYPE: ACUTE PAIN

## 2023-08-28 ASSESSMENT — PAIN DESCRIPTION - ORIENTATION
ORIENTATION: LOWER
ORIENTATION: MID
ORIENTATION: RIGHT;LEFT
ORIENTATION: LOWER

## 2023-08-28 ASSESSMENT — PAIN DESCRIPTION - FREQUENCY: FREQUENCY: CONTINUOUS

## 2023-08-28 NOTE — ACP (ADVANCE CARE PLANNING)
Advanced Care Planning Note. Purpose of Encounter: Advanced care planning in light of hemorrhagic ovarian cyst  Parties In Attendance: Patient, Devota Pouch,   Decisional Capacity: Yes  Subjective: pelvic pain  Objective: Cr 0.7  Goals of Care Determination: Patient/POA wishes to seek full treatment  Plan:  OB/GYN consult. Pain control. IVF. Code Status: Full code    Time spent on Advanced care Plannin minutes  Advanced Care Planning Documents: Completed advanced directives on chart, , Valente William, is the POA.     Gonzalo Triana DO  2023 3:13 PM

## 2023-08-28 NOTE — PROGRESS NOTES
Pharmacy Home Medication Reconciliation Note    A medication reconciliation has been completed for Joel Ackerman 1980    Pharmacy: [address] [place] [address]  Information provided by: patient and     The patient's home medication list is as follows: No current facility-administered medications on file prior to encounter. Current Outpatient Medications on File Prior to Encounter   Medication Sig Dispense Refill    ondansetron (ZOFRAN) 4 MG tablet Take 1 tablet by mouth daily as needed for Nausea or Vomiting 30 tablet 0    Rimegepant Sulfate 75 MG TBDP Take 75 mg by mouth every other day 8 tablet 0    ALPRAZolam (XANAX) 0.5 MG tablet TAKE 1 TABLET BY MOUTH THREE TIMES DAILY FOR UP TO 31 DAYS AS NEEDED FOR SLEEP 15 tablet 0    Ubrogepant (UBRELVY) 100 MG TABS Take 1 tablet by mouth 2 times daily as needed (Headache) 16 tablet 0    valACYclovir (VALTREX) 500 MG tablet TAKE 1 TABLET BY MOUTH ONE TIME A DAY 90 tablet 1    furosemide (LASIX) 20 MG tablet TAKE 1 TABLET BY MOUTH ONE TIME A DAY 90 tablet 1    buPROPion (WELLBUTRIN XL) 150 MG extended release tablet TAKE THREE TABLETS BY MOUTH EVERY MORNING AS DIRECTED (Patient taking differently: Take 2 tablets by mouth every morning) 270 tablet 1    esomeprazole (NEXIUM) 20 MG delayed release capsule Take 1 capsule by mouth daily 90 capsule 3    RYBELSUS 7 MG TABS TAKE 1 TABLET BY MOUTH ONE TIME A DAY (Patient not taking: Reported on 8/28/2023) 90 tablet 1    albuterol sulfate HFA (PROVENTIL HFA) 108 (90 Base) MCG/ACT inhaler Inhale 1 puff into the lungs every 4 hours as needed for Wheezing or Shortness of Breath 1 Inhaler 5       Patient is no longer taking Rybelsus. Of note, patient has not taken any home meds today; patient recently started on sample of Nurtec ODT 75 mg every other day for migraines. Timing of last doses updated. Thank you,  Tatianna Burnett

## 2023-08-28 NOTE — ED PROVIDER NOTES
Measure due to: Infection is not suspected    Chronic Conditions affecting care:    has a past medical history of Anxiety, Asthma, Depression, Ectopic pregnancy, Endometriosis, Headache, Kidney stones, and Menorrhagia with irregular cycle. CONSULTS: (Who and What was discussed)  [facility] HOSPITALIST  Dr. Shana Anna -hospitalist    Social Determinants Significantly [address] [facility] : None    Records Reviewed (External and Source)     CC/HPI Summary, DDx, ED Course, and Reassessment: Patient presented with severe lower abdominal and pelvic pain after intercourse 2 days ago. She initially had some bleeding but this is resolved. I did not visualize any obvious cuff tear, laceration, foreign body or bleeding on vaginal exam.  CT imaging was obtained that revealed possible colitis versus PID. Low suspicion for PID and patient is not concerned about any STDs. She does have a large amount of pain on bimanual exam.  Ultrasound imaging was obtained which reveals no obvious torsion but does reveal what appears to be hemorrhagic ovarian cyst.  She has had 2 doses of IV morphine along with IV Dilaudid and is still having severe lower abdominal pelvic pain. I discussed this with her gynecologist Dr. Bryan Levi who agrees patient should be admitted and observed overnight for pain control. Discussed with hospitalist who admit for consult with gynecology. Low suspicion for colitis, PID, obstruction, ovarian torsion or other emergent etiology. Do not believe any IV antibiotics are warranted at this time. Patient was stable at time of admission. Disposition Considerations (tests considered but not done, Admit vs D/C, Shared Decision Making, Pt Expectation of Test or Tx.):        I am the Primary Clinician of Record. FINAL IMPRESSION      1. Hemorrhagic ovarian cyst    2.  Intractable abdominal pain          DISPOSITION/PLAN     DISPOSITION Admitted 08/28/2023

## 2023-08-28 NOTE — PROGRESS NOTES
Patient seen in ED, room 09. Admission completed  to and through Psychosocial Assessment and at this time patient was becoming nauseated and asking for nausea medication. Admission nurse informed ER nurse that patient is nauseated and requesting nausea medication. The floor RN will need to start at Functional Screening and continue on with assessments in addition to completing the following : 4 Eyes Assessment, Immunizations, Covid Vaccines, Rights and Responsibilities, Orientation to room, Plan of Care, Education/Learning Assessment and Education Plan, white board, height and weight, pain assessment and head to toe assessment. Patient is alert. Patient is being admitted for Hemorrhagic ovarian cyst.  Home Medications as well as Outside Sources has been verbally reviewed with patient and updated as appropriate and is now Completed per [facility]. Plan of care updated if indicated. All questions answered. Family is at bedside: will attempt to complete admission as time allows and once patient nausea has subsided.

## 2023-08-28 NOTE — H&P
TO OB GYN  IP CONSULT TO HOSPITALIST    ASSESSMENT:    Active Hospital Problems    Diagnosis Date Noted    Hemorrhagic ovarian cyst [N83.209] 08/28/2023    Mood disorder ([address]) [F39] 08/28/2023    UTI (urinary tract infection) [N39.0] 08/28/2023    Asthma [J45.909] 07/09/2019         PLAN:  This is a 37 y.o. female who presented to [facility] and is being treated for:    Hemorrhagic ovarian cyst  -Transvaginal ultrasound showed 2.2 cm left ovarian cyst which likely reflects a hemorrhagic cyst  -Hemoglobin stable; trend  -Pain control with IV Dilaudid  -IVF  -N.p.o. midnight in anticipation for any procedure  -Daily labs; replace electrolytes as needed  -Gynecology consulted    UTI  -UA indicative of UTI  -Urine culture pending  -Rocephin    Mood disorder  -Continue home Wellbutrin and Xanax    Asthma  -Continue home as needed albuterol        DVT ppx: Contraindicated  GI ppx: PPI  Diet: ADULT DIET;  Regular  Diet NPO Exceptions are: Ice Chips, Sips of Water with Meds  Code Status: Full Code    This case was discussed with the ED provider and I agree that the patient should be admitted into the hospital.    PT/OT Eval Status: Not ordered    Disposition - home after medical stabilization and treatment       Rosi Leigh DO  8/28/2023  3:15 PM

## 2023-08-28 NOTE — ED NOTES
..ED TO INPATIENT SBAR HANDOFF    Patient Name: Judah Lopez   :  1980  37 y.o. MRN:  [id number]  Preferred Name  [facility]  ED Room #:  ED-0009/09  Family/Caregiver Present yes ()  Restraints no   Sitter no   Sepsis Risk Score Sepsis Risk Score: 0.29    Situation  Code Status: Full Code     Allergies: Patient has no known allergies. Weight: No data found. Arrived from: home  Chief Complaint:   Chief Complaint   Patient presents with    Abdominal Pain     Patient in with complaints of lower abdominal pain that began after intercourse Saturday night. States she feels something went wrong. She had hysterectomy in may. Does endorse bleeding following intercourse. Hospital Problem/Diagnosis:  Principal Problem:    Hemorrhagic ovarian cyst  Active Problems:    Asthma    Mood disorder ([address])    UTI (urinary tract infection)  Resolved Problems:    * No resolved hospital problems. *    Imaging:   US NON OB TRANSVAGINAL   Final Result   1. 2.2 cm left ovarian cyst which likely reflects a hemorrhagic cyst.  No   imaging follow-up is recommended. There is no left ovarian torsion. 2. No acute findings elsewhere in the pelvis with no free fluid or focal   fluid collection. The right ovary was not identified. 3. Prior hysterectomy. US DUP ABD PEL RETRO SCROT COMPLETE   Final Result   1. 2.2 cm left ovarian cyst which likely reflects a hemorrhagic cyst.  No   imaging follow-up is recommended. There is no left ovarian torsion. 2. No acute findings elsewhere in the pelvis with no free fluid or focal   fluid collection. The right ovary was not identified. 3. Prior hysterectomy. CT ABDOMEN PELVIS W IV CONTRAST Additional Contrast? None   Final Result   1. Trace pelvic ascites and mild mesenteric edema either due to acute colitis   or pelvic inflammatory disease.            Abnormal labs:   Abnormal Labs Reviewed   COMPREHENSIVE METABOLIC PANEL W/ REFLEX TO MG FOR LOW K - Abnormal;

## 2023-08-29 ENCOUNTER — ANESTHESIA (OUTPATIENT)
Dept: OPERATING ROOM | Age: 43
DRG: 760 | End: 2023-08-29
Payer: COMMERCIAL

## 2023-08-29 ENCOUNTER — ANESTHESIA EVENT (OUTPATIENT)
Dept: OPERATING ROOM | Age: 43
DRG: 760 | End: 2023-08-29
Payer: COMMERCIAL

## 2023-08-29 LAB
ALBUMIN SERPL-MCNC: 3.2 G/DL (ref 3.4–5)
ANION GAP SERPL CALCULATED.3IONS-SCNC: 9 MMOL/L (ref 3–16)
BACTERIA UR CULT: NORMAL
BASOPHILS # BLD: 0.1 K/UL (ref 0–0.2)
BASOPHILS NFR BLD: 0.7 %
BUN SERPL-MCNC: 5 MG/DL (ref 7–20)
CALCIUM SERPL-MCNC: 7.9 MG/DL (ref 8.3–10.6)
CHLORIDE SERPL-SCNC: 106 MMOL/L (ref 99–110)
CO2 SERPL-SCNC: 23 MMOL/L (ref 21–32)
CREAT SERPL-MCNC: <0.5 MG/DL (ref 0.6–1.1)
DEPRECATED RDW RBC AUTO: 12.5 % (ref 12.4–15.4)
EOSINOPHIL # BLD: 0.1 K/UL (ref 0–0.6)
EOSINOPHIL NFR BLD: 2.1 %
GFR SERPLBLD CREATININE-BSD FMLA CKD-EPI: >60 ML/MIN/{1.73_M2}
GLUCOSE SERPL-MCNC: 110 MG/DL (ref 70–99)
HCG UR QL: NEGATIVE
HCT VFR BLD AUTO: 35.3 % (ref 36–48)
HGB BLD-MCNC: 12 G/DL (ref 12–16)
LYMPHOCYTES # BLD: 1.7 K/UL (ref 1–5.1)
LYMPHOCYTES NFR BLD: 23.1 %
MAGNESIUM SERPL-MCNC: 2 MG/DL (ref 1.8–2.4)
MCH RBC QN AUTO: 32.5 PG (ref 26–34)
MCHC RBC AUTO-ENTMCNC: 34 G/DL (ref 31–36)
MCV RBC AUTO: 95.6 FL (ref 80–100)
MONOCYTES # BLD: 0.6 K/UL (ref 0–1.3)
MONOCYTES NFR BLD: 7.7 %
NEUTROPHILS # BLD: 4.8 K/UL (ref 1.7–7.7)
NEUTROPHILS NFR BLD: 66.4 %
PHOSPHATE SERPL-MCNC: 2.9 MG/DL (ref 2.5–4.9)
PLATELET # BLD AUTO: 209 K/UL (ref 135–450)
PMV BLD AUTO: 9.9 FL (ref 5–10.5)
POTASSIUM SERPL-SCNC: 3.4 MMOL/L (ref 3.5–5.1)
RBC # BLD AUTO: 3.69 M/UL (ref 4–5.2)
SODIUM SERPL-SCNC: 138 MMOL/L (ref 136–145)
TSH SERPL DL<=0.005 MIU/L-ACNC: 1.29 UIU/ML (ref 0.27–4.2)
WBC # BLD AUTO: 7.3 K/UL (ref 4–11)

## 2023-08-29 PROCEDURE — 84703 CHORIONIC GONADOTROPIN ASSAY: CPT

## 2023-08-29 PROCEDURE — 7100000001 HC PACU RECOVERY - ADDTL 15 MIN: Performed by: OBSTETRICS & GYNECOLOGY

## 2023-08-29 PROCEDURE — 6370000000 HC RX 637 (ALT 250 FOR IP): Performed by: STUDENT IN AN ORGANIZED HEALTH CARE EDUCATION/TRAINING PROGRAM

## 2023-08-29 PROCEDURE — 2580000003 HC RX 258: Performed by: OBSTETRICS & GYNECOLOGY

## 2023-08-29 PROCEDURE — 3600000004 HC SURGERY LEVEL 4 BASE: Performed by: OBSTETRICS & GYNECOLOGY

## 2023-08-29 PROCEDURE — 2580000003 HC RX 258: Performed by: STUDENT IN AN ORGANIZED HEALTH CARE EDUCATION/TRAINING PROGRAM

## 2023-08-29 PROCEDURE — 6360000002 HC RX W HCPCS: Performed by: NURSE ANESTHETIST, CERTIFIED REGISTERED

## 2023-08-29 PROCEDURE — 3600000012 HC SURGERY LEVEL 2 ADDTL 15MIN: Performed by: OBSTETRICS & GYNECOLOGY

## 2023-08-29 PROCEDURE — 0UQG7ZZ REPAIR VAGINA, VIA NATURAL OR ARTIFICIAL OPENING: ICD-10-PCS | Performed by: OBSTETRICS & GYNECOLOGY

## 2023-08-29 PROCEDURE — 6360000002 HC RX W HCPCS: Performed by: STUDENT IN AN ORGANIZED HEALTH CARE EDUCATION/TRAINING PROGRAM

## 2023-08-29 PROCEDURE — 80069 RENAL FUNCTION PANEL: CPT

## 2023-08-29 PROCEDURE — 6360000002 HC RX W HCPCS: Performed by: OBSTETRICS & GYNECOLOGY

## 2023-08-29 PROCEDURE — 83735 ASSAY OF MAGNESIUM: CPT

## 2023-08-29 PROCEDURE — 3600000002 HC SURGERY LEVEL 2 BASE: Performed by: OBSTETRICS & GYNECOLOGY

## 2023-08-29 PROCEDURE — 2500000003 HC RX 250 WO HCPCS: Performed by: NURSE ANESTHETIST, CERTIFIED REGISTERED

## 2023-08-29 PROCEDURE — 6360000002 HC RX W HCPCS: Performed by: INTERNAL MEDICINE

## 2023-08-29 PROCEDURE — 2580000003 HC RX 258: Performed by: INTERNAL MEDICINE

## 2023-08-29 PROCEDURE — 3700000000 HC ANESTHESIA ATTENDED CARE: Performed by: OBSTETRICS & GYNECOLOGY

## 2023-08-29 PROCEDURE — 1200000000 HC SEMI PRIVATE

## 2023-08-29 PROCEDURE — 7100000000 HC PACU RECOVERY - FIRST 15 MIN: Performed by: OBSTETRICS & GYNECOLOGY

## 2023-08-29 PROCEDURE — 3600000014 HC SURGERY LEVEL 4 ADDTL 15MIN: Performed by: OBSTETRICS & GYNECOLOGY

## 2023-08-29 PROCEDURE — 3700000001 HC ADD 15 MINUTES (ANESTHESIA): Performed by: OBSTETRICS & GYNECOLOGY

## 2023-08-29 PROCEDURE — 85025 COMPLETE CBC W/AUTO DIFF WBC: CPT

## 2023-08-29 PROCEDURE — 2709999900 HC NON-CHARGEABLE SUPPLY: Performed by: OBSTETRICS & GYNECOLOGY

## 2023-08-29 PROCEDURE — A4217 STERILE WATER/SALINE, 500 ML: HCPCS | Performed by: OBSTETRICS & GYNECOLOGY

## 2023-08-29 PROCEDURE — 36415 COLL VENOUS BLD VENIPUNCTURE: CPT

## 2023-08-29 RX ORDER — FENTANYL CITRATE 50 UG/ML
INJECTION, SOLUTION INTRAMUSCULAR; INTRAVENOUS PRN
Status: DISCONTINUED | OUTPATIENT
Start: 2023-08-29 | End: 2023-08-29 | Stop reason: SDUPTHER

## 2023-08-29 RX ORDER — MIDAZOLAM HYDROCHLORIDE 1 MG/ML
INJECTION INTRAMUSCULAR; INTRAVENOUS PRN
Status: DISCONTINUED | OUTPATIENT
Start: 2023-08-29 | End: 2023-08-29 | Stop reason: SDUPTHER

## 2023-08-29 RX ORDER — OXYCODONE HYDROCHLORIDE 5 MG/1
5 TABLET ORAL
Status: DISCONTINUED | OUTPATIENT
Start: 2023-08-29 | End: 2023-08-29 | Stop reason: HOSPADM

## 2023-08-29 RX ORDER — KETOROLAC TROMETHAMINE 30 MG/ML
INJECTION, SOLUTION INTRAMUSCULAR; INTRAVENOUS PRN
Status: DISCONTINUED | OUTPATIENT
Start: 2023-08-29 | End: 2023-08-29 | Stop reason: SDUPTHER

## 2023-08-29 RX ORDER — MEPERIDINE HYDROCHLORIDE 25 MG/ML
12.5 INJECTION INTRAMUSCULAR; INTRAVENOUS; SUBCUTANEOUS EVERY 5 MIN PRN
Status: DISCONTINUED | OUTPATIENT
Start: 2023-08-29 | End: 2023-08-29 | Stop reason: HOSPADM

## 2023-08-29 RX ORDER — HYDROMORPHONE HYDROCHLORIDE 2 MG/ML
0.5 INJECTION, SOLUTION INTRAMUSCULAR; INTRAVENOUS; SUBCUTANEOUS EVERY 5 MIN PRN
Status: DISCONTINUED | OUTPATIENT
Start: 2023-08-29 | End: 2023-08-29 | Stop reason: HOSPADM

## 2023-08-29 RX ORDER — HYDRALAZINE HYDROCHLORIDE 20 MG/ML
10 INJECTION INTRAMUSCULAR; INTRAVENOUS
Status: DISCONTINUED | OUTPATIENT
Start: 2023-08-29 | End: 2023-08-29 | Stop reason: HOSPADM

## 2023-08-29 RX ORDER — ONDANSETRON 2 MG/ML
4 INJECTION INTRAMUSCULAR; INTRAVENOUS
Status: DISCONTINUED | OUTPATIENT
Start: 2023-08-29 | End: 2023-08-29 | Stop reason: HOSPADM

## 2023-08-29 RX ORDER — PHENYLEPHRINE HCL IN 0.9% NACL 1 MG/10 ML
SYRINGE (ML) INTRAVENOUS PRN
Status: DISCONTINUED | OUTPATIENT
Start: 2023-08-29 | End: 2023-08-29 | Stop reason: SDUPTHER

## 2023-08-29 RX ORDER — MAGNESIUM HYDROXIDE 1200 MG/15ML
LIQUID ORAL CONTINUOUS PRN
Status: COMPLETED | OUTPATIENT
Start: 2023-08-29 | End: 2023-08-29

## 2023-08-29 RX ORDER — ONDANSETRON 2 MG/ML
INJECTION INTRAMUSCULAR; INTRAVENOUS PRN
Status: DISCONTINUED | OUTPATIENT
Start: 2023-08-29 | End: 2023-08-29 | Stop reason: SDUPTHER

## 2023-08-29 RX ORDER — PROPOFOL 10 MG/ML
INJECTION, EMULSION INTRAVENOUS PRN
Status: DISCONTINUED | OUTPATIENT
Start: 2023-08-29 | End: 2023-08-29 | Stop reason: SDUPTHER

## 2023-08-29 RX ORDER — LABETALOL HYDROCHLORIDE 5 MG/ML
10 INJECTION, SOLUTION INTRAVENOUS
Status: DISCONTINUED | OUTPATIENT
Start: 2023-08-29 | End: 2023-08-29 | Stop reason: HOSPADM

## 2023-08-29 RX ORDER — DEXAMETHASONE SODIUM PHOSPHATE 4 MG/ML
INJECTION, SOLUTION INTRA-ARTICULAR; INTRALESIONAL; INTRAMUSCULAR; INTRAVENOUS; SOFT TISSUE PRN
Status: DISCONTINUED | OUTPATIENT
Start: 2023-08-29 | End: 2023-08-29 | Stop reason: SDUPTHER

## 2023-08-29 RX ORDER — LIDOCAINE HYDROCHLORIDE 20 MG/ML
INJECTION, SOLUTION EPIDURAL; INFILTRATION; INTRACAUDAL; PERINEURAL PRN
Status: DISCONTINUED | OUTPATIENT
Start: 2023-08-29 | End: 2023-08-29 | Stop reason: SDUPTHER

## 2023-08-29 RX ADMIN — SODIUM CHLORIDE, POTASSIUM CHLORIDE, SODIUM LACTATE AND CALCIUM CHLORIDE: 600; 310; 30; 20 INJECTION, SOLUTION INTRAVENOUS at 13:48

## 2023-08-29 RX ADMIN — Medication 100 MCG: at 14:22

## 2023-08-29 RX ADMIN — PROMETHAZINE HYDROCHLORIDE 6.25 MG: 25 INJECTION INTRAMUSCULAR; INTRAVENOUS at 11:05

## 2023-08-29 RX ADMIN — Medication 100 MCG: at 14:15

## 2023-08-29 RX ADMIN — Medication 100 MCG: at 14:18

## 2023-08-29 RX ADMIN — ONDANSETRON 4 MG: 2 INJECTION INTRAMUSCULAR; INTRAVENOUS at 14:14

## 2023-08-29 RX ADMIN — FENTANYL CITRATE 50 MCG: 50 INJECTION, SOLUTION INTRAMUSCULAR; INTRAVENOUS at 14:07

## 2023-08-29 RX ADMIN — SODIUM CHLORIDE: 9 INJECTION, SOLUTION INTRAVENOUS at 15:40

## 2023-08-29 RX ADMIN — DEXAMETHASONE SODIUM PHOSPHATE 8 MG: 4 INJECTION, SOLUTION INTRAMUSCULAR; INTRAVENOUS at 14:14

## 2023-08-29 RX ADMIN — HYDROMORPHONE HYDROCHLORIDE 1 MG: 1 INJECTION, SOLUTION INTRAMUSCULAR; INTRAVENOUS; SUBCUTANEOUS at 08:30

## 2023-08-29 RX ADMIN — MIDAZOLAM 2 MG: 1 INJECTION INTRAMUSCULAR; INTRAVENOUS at 13:56

## 2023-08-29 RX ADMIN — CEFTRIAXONE SODIUM 1000 MG: 1 INJECTION, POWDER, FOR SOLUTION INTRAMUSCULAR; INTRAVENOUS at 15:45

## 2023-08-29 RX ADMIN — SODIUM CHLORIDE, PRESERVATIVE FREE 10 ML: 5 INJECTION INTRAVENOUS at 08:29

## 2023-08-29 RX ADMIN — ONDANSETRON 4 MG: 2 INJECTION INTRAMUSCULAR; INTRAVENOUS at 04:54

## 2023-08-29 RX ADMIN — KETOROLAC TROMETHAMINE 30 MG: 30 INJECTION, SOLUTION INTRAMUSCULAR; INTRAVENOUS at 14:33

## 2023-08-29 RX ADMIN — ALPRAZOLAM 0.5 MG: 0.5 TABLET ORAL at 12:41

## 2023-08-29 RX ADMIN — HYDROMORPHONE HYDROCHLORIDE 1 MG: 1 INJECTION, SOLUTION INTRAMUSCULAR; INTRAVENOUS; SUBCUTANEOUS at 20:43

## 2023-08-29 RX ADMIN — SODIUM CHLORIDE, POTASSIUM CHLORIDE, SODIUM LACTATE AND CALCIUM CHLORIDE: 600; 310; 30; 20 INJECTION, SOLUTION INTRAVENOUS at 15:28

## 2023-08-29 RX ADMIN — PROPOFOL 100 MG: 10 INJECTION, EMULSION INTRAVENOUS at 14:07

## 2023-08-29 RX ADMIN — POTASSIUM CHLORIDE 40 MEQ: 1500 TABLET, EXTENDED RELEASE ORAL at 08:27

## 2023-08-29 RX ADMIN — HYDROMORPHONE HYDROCHLORIDE 1 MG: 1 INJECTION, SOLUTION INTRAMUSCULAR; INTRAVENOUS; SUBCUTANEOUS at 11:06

## 2023-08-29 RX ADMIN — ONDANSETRON 4 MG: 2 INJECTION INTRAMUSCULAR; INTRAVENOUS at 20:37

## 2023-08-29 RX ADMIN — HYDROMORPHONE HYDROCHLORIDE 1 MG: 1 INJECTION, SOLUTION INTRAMUSCULAR; INTRAVENOUS; SUBCUTANEOUS at 04:54

## 2023-08-29 RX ADMIN — SODIUM CHLORIDE, POTASSIUM CHLORIDE, SODIUM LACTATE AND CALCIUM CHLORIDE: 600; 310; 30; 20 INJECTION, SOLUTION INTRAVENOUS at 08:40

## 2023-08-29 RX ADMIN — LIDOCAINE HYDROCHLORIDE 80 MG: 20 INJECTION, SOLUTION EPIDURAL; INFILTRATION; INTRACAUDAL; PERINEURAL at 14:07

## 2023-08-29 ASSESSMENT — PAIN SCALES - GENERAL
PAINLEVEL_OUTOF10: 7
PAINLEVEL_OUTOF10: 8
PAINLEVEL_OUTOF10: 0
PAINLEVEL_OUTOF10: 8
PAINLEVEL_OUTOF10: 0
PAINLEVEL_OUTOF10: 7
PAINLEVEL_OUTOF10: 5
PAINLEVEL_OUTOF10: 8
PAINLEVEL_OUTOF10: 9
PAINLEVEL_OUTOF10: 6

## 2023-08-29 ASSESSMENT — PAIN DESCRIPTION - ONSET
ONSET: ON-GOING
ONSET: ON-GOING

## 2023-08-29 ASSESSMENT — PAIN DESCRIPTION - ORIENTATION
ORIENTATION: MID;LEFT
ORIENTATION: MID

## 2023-08-29 ASSESSMENT — PAIN DESCRIPTION - PAIN TYPE
TYPE: ACUTE PAIN
TYPE: ACUTE PAIN

## 2023-08-29 ASSESSMENT — PAIN DESCRIPTION - LOCATION
LOCATION: ABDOMEN;PELVIS

## 2023-08-29 ASSESSMENT — PAIN - FUNCTIONAL ASSESSMENT
PAIN_FUNCTIONAL_ASSESSMENT: PREVENTS OR INTERFERES SOME ACTIVE ACTIVITIES AND ADLS
PAIN_FUNCTIONAL_ASSESSMENT: NONE - DENIES PAIN
PAIN_FUNCTIONAL_ASSESSMENT: PREVENTS OR INTERFERES SOME ACTIVE ACTIVITIES AND ADLS

## 2023-08-29 ASSESSMENT — PAIN DESCRIPTION - FREQUENCY
FREQUENCY: CONTINUOUS
FREQUENCY: CONTINUOUS

## 2023-08-29 ASSESSMENT — PAIN DESCRIPTION - DESCRIPTORS
DESCRIPTORS: ACHING;DISCOMFORT;CRAMPING
DESCRIPTORS: ACHING;DISCOMFORT;CRAMPING

## 2023-08-29 NOTE — CONSULTS
Department of Gynecology  Consult Note      Reason for Consult:  Asked to see patient well known to me with history of robotic hysterectomy May of 2023  Requesting Physician:  Hospitalist Group    CHIEF COMPLAINT:   Pain    History obtained from patient, with  present    HISTORY OF PRESENT ILLNESS:     The patient is a 37 y.o. female with significant past medical history  who presents with of sudden onset pelvic and lower abdominal pain after intercourse on Saturday while camping in [place]. Patient was noted as abrupt and then persistent requiring eventual attention in the ER here. Note per ER attending read and his evaluation discussed with me personally last PM    Past Medical History:        Diagnosis Date    Anxiety     Asthma     Depression     Ectopic pregnancy     Endometriosis     Headache     Kidney stones     Menorrhagia with irregular cycle      Past Surgical History:        Procedure Laterality Date    APPENDECTOMY      ECTOPIC PREGNANCY SURGERY      ENDOMETRIAL ABLATION      HYSTERECTOMY, VAGINAL Bilateral 5/19/2023    ROBOTIC LAPAROSCOPIC ASSISTED VAGINAL HYSTERECTOMY WITH BILATERAL SALPINGECTOMY performed by Musa Sanders MD at [facility] for endometriosis    [facility]TER [address]       Past Gynecological History:    1. Last menstrual period:  prior to hysterectomy  2. Menses: always problematic  3. Contraception: None  4. Sexually transmitted disease history: none        A.  Number of sexual partners in the last 6 months: 1    meds:  Current Facility-Administered Medications:     morphine injection 4 mg, 4 mg, IntraVENous, Q30 Min PRN, Jaleesa Saldaña PA-C, 4 mg at 08/28/23 1127    albuterol sulfate HFA (PROVENTIL;VENTOLIN;PROAIR) 108 (90 Base) MCG/ACT inhaler 1 puff, 1 puff, Inhalation, Q4H PRN, Rolan Alpha, DO    ALPRAZolam [place]) tablet 0.5 mg, 0.5 mg, Oral, TID PRN, Rolan Alpha, DO, 0.5 mg at 08/29/23 1241

## 2023-08-29 NOTE — PROGRESS NOTES
Patient instructed to be NPO at this time, patient verbalized understanding.  Electronically signed by Glenn Calhoun RN on 8/28/2023 at 11:58 PM

## 2023-08-29 NOTE — ANESTHESIA POSTPROCEDURE EVALUATION
Department of Anesthesiology  Postprocedure Note    Patient: Esdras Case  MRN: [id number]  YOB: 1980  Date of evaluation: 8/29/2023      Procedure Summary     Date: 08/29/23 Room / Location: [address]    Anesthesia Start: 1400 Anesthesia Stop:     Procedure: VAGINAL EXAMINATION UNDER ANESTHESIA, REPAIR OF VAGINAL CUFF (Vagina ) Diagnosis:       Pelvic pain      (Pelvic pain [R10.2])    Surgeons: Matti Cottrell MD Responsible Provider: Carlos Randle MD    Anesthesia Type: general ASA Status: 2          Anesthesia Type: No value filed.     Vanessa Phase I: Vanessa Score: 10    Vanessa Phase II:        Anesthesia Post Evaluation    Patient location during evaluation: PACU  Patient participation: complete - patient participated  Level of consciousness: awake  Airway patency: patent  Nausea & Vomiting: no vomiting and no nausea  Complications: no  Cardiovascular status: hemodynamically stable  Respiratory status: acceptable  Hydration status: stable  Multimodal analgesia pain management approach  Pain management: adequate

## 2023-08-29 NOTE — PROGRESS NOTES
Pt transferred back to room 471, bedside report to [facility]. VSS. Pt family waiting in room upon arrival. Pt awake and alert.

## 2023-08-29 NOTE — OP NOTE
Gynecology Operative Report      DATE OF PROCEDURE: 8/29/2023     SURGEON: Samia Drake MD    ASSISTANT: None    PREOPERATIVE DIAGNOSIS: Hemorrhagic ovarian cyst [N83.209]  Intractable abdominal pain [R10.9]    POSTOPERATIVE DIAGNOSIS:  Hemorrhagic ovarian cyst [N83.209]  Intractable abdominal pain [R10.9] vagina cuff laceration without dehiscence,    OPERATION: VAGINAL EXAMINATION UNDER ANESTHESIA, REPAIR OF VAGINAL CUFF    ANESTHESIA: General via LMA    ESTIMATED BLOOD LOSS:  less than 50      COMPLICATIONS: None     SPECIMENS: none    HISTORY: The patient is a 37y.o. year old female with history of above preop diagnosis. I explained the risk, benefits, expected outcome, and alternatives to the procedure. Patient understands and is in agreement. OPERATIVE COURSE:  The patient was seen and identified outside the operative suite, in which the operative site was marked as appropriate by patient, surgeon, staff, and anesthesia. The patient was then taken into the operative suite, transferred to the operative table with all bony prominences and neurovascular structures well padded and protected. The patient was sedated under the care of the anesthesia team. The operative site was prepped and draped in standard sterile fashion. The weighted speculum is placed and noted to be inadequate for visualization of the upper vagina. With the Gr retractors, the upper is seen with a noted laceration of the area consistent with a post hysterectomy scar involving the mucosa only for the bilateral US ligament attachment sites. There is further noted a intact submucosa with evidence of healthy granulation tissue and, most importantly, no evidence of dehiscence of any intraperitoneal structures. This wound is closed with a running suture of 0-Vicryl starting on the left and ending on the right. At the close of the procedure, the wound is hemostatic and intact and with normal support of the upper vagina.   All sponge,

## 2023-08-29 NOTE — CARE COORDINATION
Discharge Planning Note:    Chart reviewed and it appears that patient has minimal needs for discharge at this time. Discussed with patient and requested that case management be notified if discharge needs are identified.     - Current discharge plan is for the patient to return home. - PCP: Bina Nguyễn    Case management will continue to follow progress and update discharge plan as needed.       Risk of Readmission Score: 9%    VLADISLAV King RN    [employer]  Phone: [phone]

## 2023-08-29 NOTE — PROGRESS NOTES
Hospitalist Progress Note      Name:  James Miranda /Age/Sex: 1980  (37 y.o. female)   MRN & CSN:  [id number] & [id number] Encounter Date/Time: 2023 8:56 AM EDT   Location:  [address] [place] [id number] PCP: Abran Hauser MD     [facility] Day: 2    Subjective:   Chief Complaint:   Chief Complaint   Patient presents with    Abdominal Pain     Patient in with complaints of lower abdominal pain that began after intercourse Saturday night. States she feels something went wrong. She had hysterectomy in may. Does endorse bleeding following intercourse. James Miranda is a 37 y.o. female with a past medical history of hysterectomy and kidney stones who presented with abdominal pain and vaginal bleeding following intercourse with . Imaging noted hemorrhagic ovarian cysts. Also with abnormal u/a and started on rocephin for UTI. Interval History: Today, resting in bed. Continues with abd pain. No diarrhea or constipation. No CP or SOB. Feels poorly with poor oral intake last several days. GYN evaluated and considering surgical options. Remains NPO    Independently reviewed interval ancillary notes from obgyn. Assessment and Recommendations   Problem List  Principal Problem:    Hemorrhagic ovarian cyst  Active Problems:    Asthma    Mood disorder ([address])    UTI (urinary tract infection)  Resolved Problems:    * No resolved hospital problems.  *     Assessment and Plan:    Hemorrhagic ovarian cyst  -Transvaginal ultrasound showed 2.2 cm left ovarian cyst which likely reflects a hemorrhagic cyst  -Hemoglobin 12.3-->12 today    -Pain control with IV Dilaudid, reports adequate control, continues to require  -Keep NPO for possibly procedure  - Continue IVF and increase to 100/hr, switch to LR  -Daily labs; replace electrolytes as needed  -Gynecology consulted- Called and LM with Dr. Albert Retana to discuss case     UTI  -UA indicative of UTI  -Rocephin I continued, will

## 2023-08-29 NOTE — PROGRESS NOTES
Pt arousable to voice, oral airway d/c, simple mask left in place. Dr Karri Waters at bedside to speak with pt.

## 2023-08-29 NOTE — ANESTHESIA PRE PROCEDURE
Department of Anesthesiology  Preprocedure Note       Name:  Amari Cervantes   Age:  37 y.o.  :  1980                                          MRN:  [id number]         Date:  2023      Surgeon: Naman De Los Santos):  Nabeel Brown MD    Procedure: Procedure(s):  VAGINAL EXAMINATION UNDER ANESTHESIA, POSSIBLE REPAIR OF VAGINAL CUFF    Medications prior to admission:   Prior to Admission medications    Medication Sig Start Date End Date Taking? Authorizing Provider   ondansetron (ZOFRAN) 4 MG tablet Take 1 tablet by mouth daily as needed for Nausea or Vomiting 23   Thierno Patricio APRN - VAHID   Rimegepant Sulfate 75 MG TBDP Take 75 mg by mouth every other day 23   RICA Mccullough - CNP   ALPRAZolam Mardelle Dollar) 0.5 MG tablet TAKE 1 TABLET BY MOUTH THREE TIMES DAILY FOR UP TO 31 DAYS AS NEEDED FOR SLEEP 8/3/23 9/2/23  Lidia Lujan MD   Ubrogepant (UBRELVY) 100 MG TABS Take 1 tablet by mouth 2 times daily as needed (Headache) 23   Lidia Ljuan MD   valACYclovir (VALTREX) 500 MG tablet TAKE 1 TABLET BY MOUTH ONE TIME A DAY 23   Lidia Lujan MD   furosemide (LASIX) 20 MG tablet TAKE 1 TABLET BY MOUTH ONE TIME A DAY 23   Lidia Lujan MD   buPROPion (WELLBUTRIN XL) 150 MG extended release tablet TAKE THREE TABLETS BY MOUTH EVERY MORNING AS DIRECTED  Patient taking differently: Take 2 tablets by mouth every morning 23   Lidia Lujan MD   esomeprazole (NEXIUM) 20 MG delayed release capsule Take 1 capsule by mouth daily 23   Lidia Lujan MD   RYBELSUS 7 MG TABS TAKE 1 TABLET BY MOUTH ONE TIME A DAY  Patient not taking: Reported on 2023   Lidia Lujan MD   albuterol sulfate HFA (PROVENTIL HFA) 108 (90 Base) MCG/ACT inhaler Inhale 1 puff into the lungs every 4 hours as needed for Wheezing or Shortness of Breath 19   Lidia Lujan MD       Current medications:    No current facility-administered medications for this visit.      No current outpatient

## 2023-08-29 NOTE — PROGRESS NOTES
Pt arrived from OR, report from crna/rn. Pt had vaginal cuff repair, aylin pad in place, no obvious vaginal bleeding. Pt asleep non responsive upon arrival , respirations even unlabored, simple mask 6 liters and oral airway in place. Abdomen soft and flat.

## 2023-08-29 NOTE — PROGRESS NOTES
Shift assessment completed and charted. VSS. A/o x4. Standard safety measures in place. Bed locked and in lowest position, call light within reach. SpO2 > 90% on RA. SCD's in place. Dilaudid as needed for pain. Strict I/o. Pt stable and denied needs when writer left room.

## 2023-08-30 VITALS
WEIGHT: 135 LBS | TEMPERATURE: 97.9 F | DIASTOLIC BLOOD PRESSURE: 78 MMHG | HEIGHT: 62 IN | HEART RATE: 98 BPM | SYSTOLIC BLOOD PRESSURE: 123 MMHG | RESPIRATION RATE: 18 BRPM | OXYGEN SATURATION: 100 % | BODY MASS INDEX: 24.84 KG/M2

## 2023-08-30 LAB
ANION GAP SERPL CALCULATED.3IONS-SCNC: 8 MMOL/L (ref 3–16)
BUN SERPL-MCNC: 6 MG/DL (ref 7–20)
C TRACH DNA CVX QL NAA+PROBE: NEGATIVE
CALCIUM SERPL-MCNC: 8.9 MG/DL (ref 8.3–10.6)
CHLORIDE SERPL-SCNC: 108 MMOL/L (ref 99–110)
CO2 SERPL-SCNC: 24 MMOL/L (ref 21–32)
CREAT SERPL-MCNC: 0.5 MG/DL (ref 0.6–1.1)
DEPRECATED RDW RBC AUTO: 12.1 % (ref 12.4–15.4)
GFR SERPLBLD CREATININE-BSD FMLA CKD-EPI: >60 ML/MIN/{1.73_M2}
GLUCOSE SERPL-MCNC: 96 MG/DL (ref 70–99)
HCT VFR BLD AUTO: 35.1 % (ref 36–48)
HGB BLD-MCNC: 12 G/DL (ref 12–16)
MCH RBC QN AUTO: 32.6 PG (ref 26–34)
MCHC RBC AUTO-ENTMCNC: 34.1 G/DL (ref 31–36)
MCV RBC AUTO: 95.6 FL (ref 80–100)
N GONORRHOEA DNA CERV MUCUS QL NAA+PROBE: NEGATIVE
PLATELET # BLD AUTO: 234 K/UL (ref 135–450)
PMV BLD AUTO: 9.5 FL (ref 5–10.5)
POTASSIUM SERPL-SCNC: 4.8 MMOL/L (ref 3.5–5.1)
RBC # BLD AUTO: 3.67 M/UL (ref 4–5.2)
SODIUM SERPL-SCNC: 140 MMOL/L (ref 136–145)
WBC # BLD AUTO: 8.2 K/UL (ref 4–11)

## 2023-08-30 PROCEDURE — 85027 COMPLETE CBC AUTOMATED: CPT

## 2023-08-30 PROCEDURE — 80048 BASIC METABOLIC PNL TOTAL CA: CPT

## 2023-08-30 PROCEDURE — 6360000002 HC RX W HCPCS: Performed by: OBSTETRICS & GYNECOLOGY

## 2023-08-30 PROCEDURE — 6370000000 HC RX 637 (ALT 250 FOR IP): Performed by: OBSTETRICS & GYNECOLOGY

## 2023-08-30 PROCEDURE — 36415 COLL VENOUS BLD VENIPUNCTURE: CPT

## 2023-08-30 PROCEDURE — 2580000003 HC RX 258: Performed by: OBSTETRICS & GYNECOLOGY

## 2023-08-30 PROCEDURE — 6370000000 HC RX 637 (ALT 250 FOR IP): Performed by: NURSE PRACTITIONER

## 2023-08-30 RX ORDER — POTASSIUM CHLORIDE 20 MEQ/1
40 TABLET, EXTENDED RELEASE ORAL ONCE
Status: COMPLETED | OUTPATIENT
Start: 2023-08-30 | End: 2023-08-30

## 2023-08-30 RX ORDER — HYDROCODONE BITARTRATE AND ACETAMINOPHEN 5; 325 MG/1; MG/1
1 TABLET ORAL EVERY 6 HOURS PRN
Status: DISCONTINUED | OUTPATIENT
Start: 2023-08-30 | End: 2023-08-30 | Stop reason: HOSPADM

## 2023-08-30 RX ORDER — HYDROCODONE BITARTRATE AND ACETAMINOPHEN 5; 325 MG/1; MG/1
1 TABLET ORAL EVERY 8 HOURS PRN
Qty: 9 TABLET | Refills: 0 | Status: SHIPPED | OUTPATIENT
Start: 2023-08-30 | End: 2023-09-02

## 2023-08-30 RX ORDER — SULFAMETHOXAZOLE AND TRIMETHOPRIM 800; 160 MG/1; MG/1
1 TABLET ORAL 2 TIMES DAILY
Qty: 10 TABLET | Refills: 0 | Status: SHIPPED | OUTPATIENT
Start: 2023-08-30 | End: 2023-09-04

## 2023-08-30 RX ADMIN — HYDROCODONE BITARTRATE AND ACETAMINOPHEN 1 TABLET: 5; 325 TABLET ORAL at 12:25

## 2023-08-30 RX ADMIN — HYDROMORPHONE HYDROCHLORIDE 1 MG: 1 INJECTION, SOLUTION INTRAMUSCULAR; INTRAVENOUS; SUBCUTANEOUS at 08:22

## 2023-08-30 RX ADMIN — HYDROMORPHONE HYDROCHLORIDE 1 MG: 1 INJECTION, SOLUTION INTRAMUSCULAR; INTRAVENOUS; SUBCUTANEOUS at 02:51

## 2023-08-30 RX ADMIN — POTASSIUM CHLORIDE 40 MEQ: 1500 TABLET, EXTENDED RELEASE ORAL at 10:44

## 2023-08-30 RX ADMIN — ONDANSETRON 4 MG: 2 INJECTION INTRAMUSCULAR; INTRAVENOUS at 08:22

## 2023-08-30 RX ADMIN — BUPROPION HYDROCHLORIDE 300 MG: 150 TABLET, EXTENDED RELEASE ORAL at 08:22

## 2023-08-30 RX ADMIN — PANTOPRAZOLE SODIUM 40 MG: 40 TABLET, DELAYED RELEASE ORAL at 06:41

## 2023-08-30 RX ADMIN — SODIUM CHLORIDE, PRESERVATIVE FREE 10 ML: 5 INJECTION INTRAVENOUS at 08:25

## 2023-08-30 ASSESSMENT — PAIN SCALES - GENERAL
PAINLEVEL_OUTOF10: 9
PAINLEVEL_OUTOF10: 0
PAINLEVEL_OUTOF10: 6
PAINLEVEL_OUTOF10: 7

## 2023-08-30 ASSESSMENT — PAIN DESCRIPTION - ORIENTATION
ORIENTATION: MID
ORIENTATION: LOWER
ORIENTATION: MID

## 2023-08-30 ASSESSMENT — PAIN DESCRIPTION - DESCRIPTORS
DESCRIPTORS: CRAMPING;DISCOMFORT
DESCRIPTORS: CRAMPING;DISCOMFORT

## 2023-08-30 ASSESSMENT — PAIN DESCRIPTION - LOCATION
LOCATION: ABDOMEN
LOCATION: ABDOMEN;PELVIS
LOCATION: ABDOMEN;PELVIS

## 2023-08-30 NOTE — PLAN OF CARE
Problem: Discharge Planning  Goal: Discharge to home or other facility with appropriate resources  8/30/2023 0912 by Beatriz Pearson RN  Outcome: Progressing  8/30/2023 0750 by Kamilah Sullivan RN  Outcome: Progressing     Problem: Pain  Goal: Verbalizes/displays adequate comfort level or baseline comfort level  8/30/2023 0912 by Beatriz Pearson RN  Outcome: Progressing  8/30/2023 0750 by Kamilah Sullivan RN  Outcome: Progressing     Problem: Safety - Adult  Goal: Free from fall injury  8/30/2023 0912 by Beatriz Pearson RN  Outcome: Progressing  8/30/2023 0750 by Kamilah Sullivan RN  Outcome: Progressing     Problem: ABCDS Injury Assessment  Goal: Absence of physical injury  8/30/2023 0912 by Beatriz Pearson RN  Outcome: Progressing  8/30/2023 0750 by Kamilah Sullivan RN  Outcome: Progressing     Problem: Skin/Tissue Integrity  Goal: Absence of new skin breakdown  Description: 1. Monitor for areas of redness and/or skin breakdown  2. Assess vascular access sites hourly  3. Every 4-6 hours minimum:  Change oxygen saturation probe site  4. Every 4-6 hours:  If on nasal continuous positive airway pressure, respiratory therapy assess nares and determine need for appliance change or resting period.   8/30/2023 0912 by Beatriz Pearson RN  Outcome: Progressing  8/30/2023 0750 by Kamilah Sullivan RN  Outcome: Progressing

## 2023-08-30 NOTE — PROGRESS NOTES
Physician Discharge Summary     Patient ID:  Gia Lindsey  [id number]  66 y.o.  1980    Admit date: 8/28/2023    Discharge date:      Admitting Physician: Jonathan Dunn MD    Discharge Diagnoses: Hemorrhagic ovarian cyst [N83.209]  Intractable abdominal pain [R10.9]    Discharged Condition: STABLE    Procedures Performed: repair of vaginal cuff laceration    Hospital Course: See dictated op note. Discharge Exam:  Appears well, AVSS, abdomen soft, appropriately tender, nondistended, BS present, incisions clean dry, intact    Disposition: Good    Patient Instructions:   I spent approximately 10 minutes face to face with patient and with  present reviewing findings at time of surgery and the procedure for repair. She has responded well to the repair and is approved for discharge per admitting physician.    Activity: ambulate in house, no lifting or strenuous exercise for 6 weeks, no sex for 6-8 weeks and no driving while on analgesics    Diet: Regular    Wound Care: not indicated    Discharge Medication:      Medication List        ASK your doctor about these medications      albuterol sulfate  (90 Base) MCG/ACT inhaler  Commonly known as: Proventil HFA  Inhale 1 puff into the lungs every 4 hours as needed for Wheezing or Shortness of Breath     ALPRAZolam 0.5 MG tablet  Commonly known as: XANAX  TAKE 1 TABLET BY MOUTH THREE TIMES DAILY FOR UP TO 31 DAYS AS NEEDED FOR SLEEP     buPROPion 150 MG extended release tablet  Commonly known as: WELLBUTRIN XL  TAKE THREE TABLETS BY MOUTH EVERY MORNING AS DIRECTED     esomeprazole 20 MG delayed release capsule  Commonly known as: NexIUM  Take 1 capsule by mouth daily     furosemide 20 MG tablet  Commonly known as: LASIX  TAKE 1 TABLET BY MOUTH ONE TIME A DAY     ondansetron 4 MG tablet  Commonly known as: ZOFRAN  Take 1 tablet by mouth daily as needed for Nausea or Vomiting     Rimegepant Sulfate 75 MG Tbdp  Take 75 mg by mouth every other day

## 2023-08-30 NOTE — DISCHARGE INSTRUCTIONS
ambulate in house, no lifting or strenuous exercise for 6 weeks, no sex for 6-8 weeks and no driving while on analgesics    Call Dr. Beatris Chery office for appt and to clarify if he would like you on oral antibiotics after surgery. You did not have a UTI.

## 2023-08-30 NOTE — DISCHARGE SUMMARY
CONTINUE taking these medications      albuterol sulfate  (90 Base) MCG/ACT inhaler  Commonly known as: Proventil HFA  Inhale 1 puff into the lungs every 4 hours as needed for Wheezing or Shortness of Breath     ALPRAZolam 0.5 MG tablet  Commonly known as: XANAX  TAKE 1 TABLET BY MOUTH THREE TIMES DAILY FOR UP TO 31 DAYS AS NEEDED FOR SLEEP     esomeprazole 20 MG delayed release capsule  Commonly known as: NexIUM  Take 1 capsule by mouth daily     furosemide 20 MG tablet  Commonly known as: LASIX  TAKE 1 TABLET BY MOUTH ONE TIME A DAY     ondansetron 4 MG tablet  Commonly known as: ZOFRAN  Take 1 tablet by mouth daily as needed for Nausea or Vomiting     Rimegepant Sulfate 75 MG Tbdp  Take 75 mg by mouth every other day     Ubrelvy 100 MG Tabs  Generic drug: Ubrogepant  Take 1 tablet by mouth 2 times daily as needed (Headache)     valACYclovir 500 MG tablet  Commonly known as: VALTREX  TAKE 1 TABLET BY MOUTH ONE TIME A DAY            STOP taking these medications      Rybelsus 7 MG Tabs  Generic drug: Semaglutide               Where to Get Your Medications        You can get these medications from any pharmacy    Bring a paper prescription for each of these medications  HYDROcodone-acetaminophen 5-325 MG per tablet       Spent 35 minutes in discharge process.     Signed:  RICA Almanzar CNP     8/30/2023 2:51 PM

## 2023-09-06 ENCOUNTER — OFFICE VISIT (OUTPATIENT)
Dept: INTERNAL MEDICINE CLINIC | Age: 43
End: 2023-09-06
Payer: COMMERCIAL

## 2023-09-06 VITALS
TEMPERATURE: 97.1 F | BODY MASS INDEX: 24.69 KG/M2 | HEART RATE: 97 BPM | WEIGHT: 135 LBS | DIASTOLIC BLOOD PRESSURE: 74 MMHG | SYSTOLIC BLOOD PRESSURE: 128 MMHG | OXYGEN SATURATION: 99 %

## 2023-09-06 DIAGNOSIS — Z09 HOSPITAL DISCHARGE FOLLOW-UP: ICD-10-CM

## 2023-09-06 DIAGNOSIS — S39.013D: Primary | ICD-10-CM

## 2023-09-06 DIAGNOSIS — G43.011 INTRACTABLE MIGRAINE WITHOUT AURA AND WITH STATUS MIGRAINOSUS: ICD-10-CM

## 2023-09-06 PROCEDURE — 1111F DSCHRG MED/CURRENT MED MERGE: CPT | Performed by: INTERNAL MEDICINE

## 2023-09-06 PROCEDURE — 99213 OFFICE O/P EST LOW 20 MIN: CPT | Performed by: INTERNAL MEDICINE

## 2023-09-06 RX ORDER — RIMEGEPANT SULFATE 75 MG/75MG
1 TABLET, ORALLY DISINTEGRATING ORAL EVERY OTHER DAY
Qty: 30 TABLET | Refills: 3
Start: 2023-09-06

## 2023-09-06 ASSESSMENT — PATIENT HEALTH QUESTIONNAIRE - PHQ9
3. TROUBLE FALLING OR STAYING ASLEEP: 0
8. MOVING OR SPEAKING SO SLOWLY THAT OTHER PEOPLE COULD HAVE NOTICED. OR THE OPPOSITE, BEING SO FIGETY OR RESTLESS THAT YOU HAVE BEEN MOVING AROUND A LOT MORE THAN USUAL: 0
SUM OF ALL RESPONSES TO PHQ QUESTIONS 1-9: 0
10. IF YOU CHECKED OFF ANY PROBLEMS, HOW DIFFICULT HAVE THESE PROBLEMS MADE IT FOR YOU TO DO YOUR WORK, TAKE CARE OF THINGS AT HOME, OR GET ALONG WITH OTHER PEOPLE: 0
2. FEELING DOWN, DEPRESSED OR HOPELESS: 0
SUM OF ALL RESPONSES TO PHQ9 QUESTIONS 1 & 2: 0
4. FEELING TIRED OR HAVING LITTLE ENERGY: 0
SUM OF ALL RESPONSES TO PHQ QUESTIONS 1-9: 0
5. POOR APPETITE OR OVEREATING: 0
SUM OF ALL RESPONSES TO PHQ QUESTIONS 1-9: 0
1. LITTLE INTEREST OR PLEASURE IN DOING THINGS: 0
7. TROUBLE CONCENTRATING ON THINGS, SUCH AS READING THE NEWSPAPER OR WATCHING TELEVISION: 0
9. THOUGHTS THAT YOU WOULD BE BETTER OFF DEAD, OR OF HURTING YOURSELF: 0
6. FEELING BAD ABOUT YOURSELF - OR THAT YOU ARE A FAILURE OR HAVE LET YOURSELF OR YOUR FAMILY DOWN: 0
SUM OF ALL RESPONSES TO PHQ QUESTIONS 1-9: 0

## 2023-09-06 NOTE — PROGRESS NOTES
Post-Discharge Transitional Care  Follow Up      Jose Dutta   YOB: 1980    Date of Office Visit:  9/6/2023  Date of Hospital Admission: 8/28/23  Date of Hospital Discharge: 8/30/23  Risk of hospital readmission (high >=14%. Medium >=10%) :Readmission Risk Score: 8.2      Care management risk score Rising risk (score 2-5) and Complex Care (Scores >=6): No Risk Score On File     Non face to face  following discharge, date last encounter closed (first attempt may have been earlier): *No documented post hospital discharge outreach found in the last 14 days    Call initiated 2 business days of discharge: *No response recorded in the last 14 days    ASSESSMENT/PLAN:   Tear of vaginal muscle, subsequent encounter  Intractable migraine without aura and with status migrainosus  -     Rimegepant Sulfate (NURTEC) 75 MG TBDP; Take 1 tablet by mouth every other day, Disp-30 tablet, [id number]  [address] discharge follow-up  -     LA DISCHARGE MEDS RECONCILED W/ CURRENT OUTPATIENT MED LIST  -     CBC with Auto Differential      Medical Decision Making: moderate complexity  Return in about 4 months (around 1/6/2024) for Vginal per shiv carreon. On this date 9/6/2023 I have spent 90 minutes reviewing previous notes, test results and face to face with the patient discussing the diagnosis and importance of compliance with the treatment plan as well as documenting on the day of the visit. Subjective:   HPI:  Follow up of Hospital problems/diagnosis(es): Vaginal tear  Inpatient course: Discharge summary reviewed- see chart. Interval history/Current status: Patient was recently discharged from hospital after suffering a vaginal tear after rough sexual intercourse. Patient had bleeding and extreme pain after the event occurred and went to the emergency room.   The wound was originally thought to be related to an ovarian hemorrhagic cyst, however the cyst is still there and there is no evidence of

## 2023-09-07 LAB
BASOPHILS # BLD: 0.1 K/UL (ref 0–0.2)
BASOPHILS NFR BLD: 0.9 %
DEPRECATED RDW RBC AUTO: 12.2 % (ref 12.4–15.4)
EOSINOPHIL # BLD: 0.2 K/UL (ref 0–0.6)
EOSINOPHIL NFR BLD: 3.6 %
HCT VFR BLD AUTO: 41.8 % (ref 36–48)
HGB BLD-MCNC: 14.8 G/DL (ref 12–16)
LYMPHOCYTES # BLD: 1.9 K/UL (ref 1–5.1)
LYMPHOCYTES NFR BLD: 31.4 %
MCH RBC QN AUTO: 33.7 PG (ref 26–34)
MCHC RBC AUTO-ENTMCNC: 35.4 G/DL (ref 31–36)
MCV RBC AUTO: 95.1 FL (ref 80–100)
MONOCYTES # BLD: 0.5 K/UL (ref 0–1.3)
MONOCYTES NFR BLD: 8.7 %
NEUTROPHILS # BLD: 3.4 K/UL (ref 1.7–7.7)
NEUTROPHILS NFR BLD: 55.4 %
PLATELET # BLD AUTO: 297 K/UL (ref 135–450)
PMV BLD AUTO: 9.6 FL (ref 5–10.5)
RBC # BLD AUTO: 4.4 M/UL (ref 4–5.2)
WBC # BLD AUTO: 6.1 K/UL (ref 4–11)

## 2023-09-07 ASSESSMENT — ENCOUNTER SYMPTOMS
DIARRHEA: 0
BLOOD IN STOOL: 0
CONSTIPATION: 0
COUGH: 0
SHORTNESS OF BREATH: 0

## 2023-09-12 DIAGNOSIS — Z00.00 ROUTINE ADULT HEALTH MAINTENANCE: Primary | ICD-10-CM

## 2023-09-13 ENCOUNTER — HOSPITAL ENCOUNTER (OUTPATIENT)
Dept: MAMMOGRAPHY | Age: 43
Discharge: HOME | End: 2023-09-13
Payer: COMMERCIAL

## 2023-09-13 ENCOUNTER — APPOINTMENT (OUTPATIENT)
Dept: ULTRASOUND IMAGING | Age: 43
End: 2023-09-13
Payer: COMMERCIAL

## 2023-09-13 ENCOUNTER — HOSPITAL ENCOUNTER (EMERGENCY)
Age: 43
Discharge: HOME | End: 2023-09-14
Attending: STUDENT IN AN ORGANIZED HEALTH CARE EDUCATION/TRAINING PROGRAM
Payer: COMMERCIAL

## 2023-09-13 ENCOUNTER — APPOINTMENT (OUTPATIENT)
Dept: CT IMAGING | Age: 43
End: 2023-09-13
Payer: COMMERCIAL

## 2023-09-13 VITALS — HEIGHT: 62 IN | BODY MASS INDEX: 24.84 KG/M2 | WEIGHT: 135 LBS

## 2023-09-13 DIAGNOSIS — R10.30 LOWER ABDOMINAL PAIN: Primary | ICD-10-CM

## 2023-09-13 DIAGNOSIS — Z12.31 VISIT FOR SCREENING MAMMOGRAM: ICD-10-CM

## 2023-09-13 DIAGNOSIS — Z00.00 ROUTINE ADULT HEALTH MAINTENANCE: ICD-10-CM

## 2023-09-13 DIAGNOSIS — R50.9 FEVER IN ADULT: ICD-10-CM

## 2023-09-13 DIAGNOSIS — K59.00 CONSTIPATION, UNSPECIFIED CONSTIPATION TYPE: ICD-10-CM

## 2023-09-13 DIAGNOSIS — K52.9 COLITIS: ICD-10-CM

## 2023-09-13 LAB
ALBUMIN SERPL-MCNC: 4.1 G/DL (ref 3.4–5)
ALBUMIN/GLOB SERPL: 1.4 {RATIO} (ref 1.1–2.2)
ALP SERPL-CCNC: 62 U/L (ref 40–129)
ALT SERPL-CCNC: 14 U/L (ref 10–40)
ANION GAP SERPL CALCULATED.3IONS-SCNC: 14 MMOL/L (ref 3–16)
AST SERPL-CCNC: 25 U/L (ref 15–37)
BASOPHILS # BLD: 0.1 K/UL (ref 0–0.2)
BASOPHILS NFR BLD: 0.8 %
BILIRUB SERPL-MCNC: 0.4 MG/DL (ref 0–1)
BILIRUB UR QL STRIP.AUTO: NEGATIVE
BUN SERPL-MCNC: 11 MG/DL (ref 7–20)
CALCIUM SERPL-MCNC: 9.4 MG/DL (ref 8.3–10.6)
CHLORIDE SERPL-SCNC: 103 MMOL/L (ref 99–110)
CHOLEST SERPL-MCNC: 170 MG/DL (ref 0–199)
CLARITY UR: CLEAR
CO2 SERPL-SCNC: 20 MMOL/L (ref 21–32)
COLOR UR: YELLOW
CREAT SERPL-MCNC: 0.6 MG/DL (ref 0.6–1.1)
DEPRECATED RDW RBC AUTO: 12 % (ref 12.4–15.4)
EOSINOPHIL # BLD: 0.2 K/UL (ref 0–0.6)
EOSINOPHIL NFR BLD: 2.3 %
GFR SERPLBLD CREATININE-BSD FMLA CKD-EPI: >60 ML/MIN/{1.73_M2}
GLUCOSE SERPL-MCNC: 90 MG/DL (ref 70–99)
GLUCOSE UR STRIP.AUTO-MCNC: NEGATIVE MG/DL
HCG SERPL QL: NEGATIVE
HCT VFR BLD AUTO: 39.9 % (ref 36–48)
HDLC SERPL-MCNC: 65 MG/DL (ref 40–60)
HGB BLD-MCNC: 13.8 G/DL (ref 12–16)
HGB UR QL STRIP.AUTO: NEGATIVE
KETONES UR STRIP.AUTO-MCNC: NEGATIVE MG/DL
LDL CHOLESTEROL CALCULATED: 83 MG/DL
LEUKOCYTE ESTERASE UR QL STRIP.AUTO: NEGATIVE
LIPASE SERPL-CCNC: 40 U/L (ref 13–60)
LYMPHOCYTES # BLD: 2.2 K/UL (ref 1–5.1)
LYMPHOCYTES NFR BLD: 32.2 %
MCH RBC QN AUTO: 32.4 PG (ref 26–34)
MCHC RBC AUTO-ENTMCNC: 34.6 G/DL (ref 31–36)
MCV RBC AUTO: 93.7 FL (ref 80–100)
MONOCYTES # BLD: 0.4 K/UL (ref 0–1.3)
MONOCYTES NFR BLD: 6.1 %
NEUTROPHILS # BLD: 4 K/UL (ref 1.7–7.7)
NEUTROPHILS NFR BLD: 58.6 %
NITRITE UR QL STRIP.AUTO: NEGATIVE
PH UR STRIP.AUTO: 7.5 [PH] (ref 5–8)
PLATELET # BLD AUTO: 330 K/UL (ref 135–450)
PMV BLD AUTO: 9.3 FL (ref 5–10.5)
POTASSIUM SERPL-SCNC: 4.1 MMOL/L (ref 3.5–5.1)
PROT SERPL-MCNC: 7 G/DL (ref 6.4–8.2)
PROT UR STRIP.AUTO-MCNC: NEGATIVE MG/DL
RBC # BLD AUTO: 4.26 M/UL (ref 4–5.2)
SODIUM SERPL-SCNC: 137 MMOL/L (ref 136–145)
SP GR UR STRIP.AUTO: 1.01 (ref 1–1.03)
TRIGL SERPL-MCNC: 111 MG/DL (ref 0–150)
UA COMPLETE W REFLEX CULTURE PNL UR: NORMAL
UA DIPSTICK W REFLEX MICRO PNL UR: NORMAL
URN SPEC COLLECT METH UR: NORMAL
UROBILINOGEN UR STRIP-ACNC: 0.2 E.U./DL
VLDLC SERPL CALC-MCNC: 22 MG/DL
WBC # BLD AUTO: 6.8 K/UL (ref 4–11)

## 2023-09-13 PROCEDURE — 96372 THER/PROPH/DIAG INJ SC/IM: CPT

## 2023-09-13 PROCEDURE — 96374 THER/PROPH/DIAG INJ IV PUSH: CPT

## 2023-09-13 PROCEDURE — 80053 COMPREHEN METABOLIC PANEL: CPT

## 2023-09-13 PROCEDURE — 93975 VASCULAR STUDY: CPT

## 2023-09-13 PROCEDURE — 85025 COMPLETE CBC W/AUTO DIFF WBC: CPT

## 2023-09-13 PROCEDURE — 76856 US EXAM PELVIC COMPLETE: CPT

## 2023-09-13 PROCEDURE — 6360000002 HC RX W HCPCS

## 2023-09-13 PROCEDURE — 99285 EMERGENCY DEPT VISIT HI MDM: CPT

## 2023-09-13 PROCEDURE — 96375 TX/PRO/DX INJ NEW DRUG ADDON: CPT

## 2023-09-13 PROCEDURE — 77063 BREAST TOMOSYNTHESIS BI: CPT

## 2023-09-13 PROCEDURE — 74177 CT ABD & PELVIS W/CONTRAST: CPT

## 2023-09-13 PROCEDURE — A4216 STERILE WATER/SALINE, 10 ML: HCPCS | Performed by: STUDENT IN AN ORGANIZED HEALTH CARE EDUCATION/TRAINING PROGRAM

## 2023-09-13 PROCEDURE — 84703 CHORIONIC GONADOTROPIN ASSAY: CPT

## 2023-09-13 PROCEDURE — 2580000003 HC RX 258: Performed by: STUDENT IN AN ORGANIZED HEALTH CARE EDUCATION/TRAINING PROGRAM

## 2023-09-13 PROCEDURE — 36415 COLL VENOUS BLD VENIPUNCTURE: CPT

## 2023-09-13 PROCEDURE — 81003 URINALYSIS AUTO W/O SCOPE: CPT

## 2023-09-13 PROCEDURE — 96361 HYDRATE IV INFUSION ADD-ON: CPT

## 2023-09-13 PROCEDURE — 83690 ASSAY OF LIPASE: CPT

## 2023-09-13 PROCEDURE — 6360000002 HC RX W HCPCS: Performed by: STUDENT IN AN ORGANIZED HEALTH CARE EDUCATION/TRAINING PROGRAM

## 2023-09-13 PROCEDURE — 2500000003 HC RX 250 WO HCPCS: Performed by: STUDENT IN AN ORGANIZED HEALTH CARE EDUCATION/TRAINING PROGRAM

## 2023-09-13 PROCEDURE — 6360000004 HC RX CONTRAST MEDICATION: Performed by: STUDENT IN AN ORGANIZED HEALTH CARE EDUCATION/TRAINING PROGRAM

## 2023-09-13 RX ORDER — ONDANSETRON 2 MG/ML
4 INJECTION INTRAMUSCULAR; INTRAVENOUS ONCE
Status: COMPLETED | OUTPATIENT
Start: 2023-09-13 | End: 2023-09-13

## 2023-09-13 RX ORDER — DIPHENHYDRAMINE HYDROCHLORIDE 50 MG/ML
50 INJECTION INTRAMUSCULAR; INTRAVENOUS ONCE
Status: COMPLETED | OUTPATIENT
Start: 2023-09-13 | End: 2023-09-13

## 2023-09-13 RX ORDER — EPINEPHRINE 1 MG/ML
INJECTION, SOLUTION INTRAMUSCULAR; SUBCUTANEOUS
Status: COMPLETED
Start: 2023-09-13 | End: 2023-09-13

## 2023-09-13 RX ORDER — EPINEPHRINE 1 MG/ML
0.3 INJECTION, SOLUTION INTRAMUSCULAR; SUBCUTANEOUS ONCE
Status: COMPLETED | OUTPATIENT
Start: 2023-09-13 | End: 2023-09-13

## 2023-09-13 RX ORDER — EPINEPHRINE 1 MG/ML
0.3 INJECTION, SOLUTION INTRAMUSCULAR; SUBCUTANEOUS ONCE
Status: DISCONTINUED | OUTPATIENT
Start: 2023-09-13 | End: 2023-09-13

## 2023-09-13 RX ORDER — DEXAMETHASONE SODIUM PHOSPHATE 4 MG/ML
10 INJECTION, SOLUTION INTRA-ARTICULAR; INTRALESIONAL; INTRAMUSCULAR; INTRAVENOUS; SOFT TISSUE ONCE
Status: COMPLETED | OUTPATIENT
Start: 2023-09-13 | End: 2023-09-13

## 2023-09-13 RX ORDER — 0.9 % SODIUM CHLORIDE 0.9 %
1000 INTRAVENOUS SOLUTION INTRAVENOUS ONCE
Status: COMPLETED | OUTPATIENT
Start: 2023-09-13 | End: 2023-09-13

## 2023-09-13 RX ORDER — MORPHINE SULFATE 4 MG/ML
4 INJECTION, SOLUTION INTRAMUSCULAR; INTRAVENOUS ONCE
Status: COMPLETED | OUTPATIENT
Start: 2023-09-13 | End: 2023-09-13

## 2023-09-13 RX ORDER — HYDROMORPHONE HYDROCHLORIDE 1 MG/ML
1 INJECTION, SOLUTION INTRAMUSCULAR; INTRAVENOUS; SUBCUTANEOUS ONCE
Status: COMPLETED | OUTPATIENT
Start: 2023-09-13 | End: 2023-09-13

## 2023-09-13 RX ADMIN — ONDANSETRON 4 MG: 2 INJECTION INTRAMUSCULAR; INTRAVENOUS at 17:50

## 2023-09-13 RX ADMIN — SODIUM CHLORIDE 1000 ML: 9 INJECTION, SOLUTION INTRAVENOUS at 17:49

## 2023-09-13 RX ADMIN — IOPAMIDOL 75 ML: 755 INJECTION, SOLUTION INTRAVENOUS at 18:27

## 2023-09-13 RX ADMIN — DIPHENHYDRAMINE HYDROCHLORIDE 50 MG: 50 INJECTION, SOLUTION INTRAMUSCULAR; INTRAVENOUS at 18:13

## 2023-09-13 RX ADMIN — FAMOTIDINE 20 MG: 10 INJECTION, SOLUTION INTRAVENOUS at 18:11

## 2023-09-13 RX ADMIN — EPINEPHRINE 0.3 MG: 1 INJECTION, SOLUTION INTRAMUSCULAR; SUBCUTANEOUS at 18:12

## 2023-09-13 RX ADMIN — DEXAMETHASONE SODIUM PHOSPHATE 10 MG: 4 INJECTION, SOLUTION INTRAMUSCULAR; INTRAVENOUS at 18:11

## 2023-09-13 RX ADMIN — EPINEPHRINE 0.3 MG: 1 INJECTION INTRAMUSCULAR; INTRAVENOUS; SUBCUTANEOUS at 18:12

## 2023-09-13 RX ADMIN — MORPHINE SULFATE 4 MG: 4 INJECTION, SOLUTION INTRAMUSCULAR; INTRAVENOUS at 17:51

## 2023-09-13 RX ADMIN — HYDROMORPHONE HYDROCHLORIDE 1 MG: 1 INJECTION, SOLUTION INTRAMUSCULAR; INTRAVENOUS; SUBCUTANEOUS at 20:35

## 2023-09-13 ASSESSMENT — PAIN DESCRIPTION - LOCATION: LOCATION: HIP

## 2023-09-13 ASSESSMENT — PAIN SCALES - GENERAL: PAINLEVEL_OUTOF10: 10

## 2023-09-14 VITALS
OXYGEN SATURATION: 91 % | TEMPERATURE: 100.4 F | SYSTOLIC BLOOD PRESSURE: 117 MMHG | RESPIRATION RATE: 14 BRPM | HEART RATE: 72 BPM | DIASTOLIC BLOOD PRESSURE: 81 MMHG

## 2023-09-14 PROCEDURE — 6360000002 HC RX W HCPCS: Performed by: EMERGENCY MEDICINE

## 2023-09-14 PROCEDURE — 96375 TX/PRO/DX INJ NEW DRUG ADDON: CPT

## 2023-09-14 PROCEDURE — 96376 TX/PRO/DX INJ SAME DRUG ADON: CPT

## 2023-09-14 PROCEDURE — 6370000000 HC RX 637 (ALT 250 FOR IP): Performed by: STUDENT IN AN ORGANIZED HEALTH CARE EDUCATION/TRAINING PROGRAM

## 2023-09-14 RX ORDER — OXYCODONE HYDROCHLORIDE 5 MG/1
5 TABLET ORAL EVERY 8 HOURS PRN
Qty: 10 TABLET | Refills: 0 | Status: SHIPPED | OUTPATIENT
Start: 2023-09-14 | End: 2023-09-21

## 2023-09-14 RX ORDER — AMOXICILLIN AND CLAVULANATE POTASSIUM 875; 125 MG/1; MG/1
1 TABLET, FILM COATED ORAL EVERY 12 HOURS SCHEDULED
Status: CANCELLED | OUTPATIENT
Start: 2023-09-14

## 2023-09-14 RX ORDER — HYDROMORPHONE HYDROCHLORIDE 1 MG/ML
0.5 INJECTION, SOLUTION INTRAMUSCULAR; INTRAVENOUS; SUBCUTANEOUS ONCE
Status: COMPLETED | OUTPATIENT
Start: 2023-09-14 | End: 2023-09-14

## 2023-09-14 RX ORDER — ONDANSETRON 2 MG/ML
INJECTION INTRAMUSCULAR; INTRAVENOUS
Status: DISCONTINUED
Start: 2023-09-14 | End: 2023-09-14 | Stop reason: HOSPADM

## 2023-09-14 RX ORDER — DROPERIDOL 2.5 MG/ML
0.62 INJECTION, SOLUTION INTRAMUSCULAR; INTRAVENOUS ONCE
Status: COMPLETED | OUTPATIENT
Start: 2023-09-14 | End: 2023-09-14

## 2023-09-14 RX ORDER — MELOXICAM 7.5 MG/1
7.5 TABLET ORAL DAILY
Qty: 5 TABLET | Refills: 0 | Status: SHIPPED | OUTPATIENT
Start: 2023-09-14 | End: 2023-09-19

## 2023-09-14 RX ORDER — ACETAMINOPHEN 500 MG
1000 TABLET ORAL 3 TIMES DAILY
Qty: 30 TABLET | Refills: 0 | Status: SHIPPED | OUTPATIENT
Start: 2023-09-14 | End: 2023-09-19

## 2023-09-14 RX ORDER — AMOXICILLIN AND CLAVULANATE POTASSIUM 875; 125 MG/1; MG/1
1 TABLET, FILM COATED ORAL 2 TIMES DAILY
Qty: 14 TABLET | Refills: 0 | Status: SHIPPED | OUTPATIENT
Start: 2023-09-14 | End: 2023-09-21

## 2023-09-14 RX ORDER — PROCHLORPERAZINE MALEATE 5 MG/1
5 TABLET ORAL EVERY 12 HOURS PRN
Qty: 5 TABLET | Refills: 0 | Status: SHIPPED | OUTPATIENT
Start: 2023-09-14

## 2023-09-14 RX ORDER — ONDANSETRON 4 MG/1
4 TABLET, ORALLY DISINTEGRATING ORAL EVERY 8 HOURS PRN
Qty: 10 TABLET | Refills: 0 | Status: SHIPPED | OUTPATIENT
Start: 2023-09-14

## 2023-09-14 RX ADMIN — DROPERIDOL 0.62 MG: 2.5 INJECTION, SOLUTION INTRAMUSCULAR; INTRAVENOUS at 01:08

## 2023-09-14 RX ADMIN — POLYETHYLENE GLYCOL-3350 AND ELECTROLYTES 2000 ML: 236; 6.74; 5.86; 2.97; 22.74 POWDER, FOR SOLUTION ORAL at 00:00

## 2023-09-14 RX ADMIN — HYDROMORPHONE HYDROCHLORIDE 0.5 MG: 1 INJECTION, SOLUTION INTRAMUSCULAR; INTRAVENOUS; SUBCUTANEOUS at 01:09

## 2023-09-14 ASSESSMENT — PAIN DESCRIPTION - LOCATION: LOCATION: ABDOMEN

## 2023-09-14 ASSESSMENT — PAIN SCALES - GENERAL
PAINLEVEL_OUTOF10: 6
PAINLEVEL_OUTOF10: 4

## 2023-09-14 NOTE — DISCHARGE INSTRUCTIONS
Your prescription has been sent to [facility]. Drink the container of medication for constipation that you were given tonight over the course of the next 24-36 hours. Be sure to contact your primary care provider's office to arrange for a follow up visit. If you have any new or worsening issues after going home don't hesitate to return here for reevaluation at any time 24/7!

## 2023-09-14 NOTE — ED NOTES
Pt noted to have rashes to left arm after IV medication administration, MD notified, orders per [facility], RN  09/13/23 5002

## 2023-09-15 ENCOUNTER — CARE COORDINATION (OUTPATIENT)
Dept: OTHER | Facility: CLINIC | Age: 43
End: 2023-09-15

## 2023-09-15 NOTE — CARE COORDINATION
Ambulatory Care Manager [facility]) contacted the patient to introduce the Associate Care Management Program related to ER visit on 9/13-14/23. ACM reviewed and updated CC Protocol , medication , and education . Patient is 38 yo female taken to the ER from work on 9/13/23 due to sudden onset of severe abdominal pain. Patient with history of surgery on 8/29/23 to repair vaginal tear and hemorrhagic cyst.   In ER  patient had labs, CT of abdomen and Pelvis, and US. Patient was diagnosed with Constipation and Stasis,Colitis and partial ileus. Patient was treated in the ER with multiple pain meds before relief obtained. She was given a soap suds enema. Also provided with Golytely  to complete at home. Patient was monitored in the ER and was discharged home on 9/14/23 am with prescriptions for Augmentin, Mobic, Roxicodone, Zofran and Compazine, and was instructed to complete entie bottle of Golytely and follow up with OBGYN and PCP. At call today patient reports she is feeling better, denies pain, states she has completed half the Golytely and plans to complete remainder at home later today. States she has prescriptions and is taking medications as directed with no side effects or problems. 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 She has scheduled follow up next week with GYN and PCP. Reviewed Medications, Red Flag Symptoms and DC instructions, patient verbalized understanding. Provided Education:  Discussed red flags and appropriate site of care based on symptoms and resources available to patient including: PCP  Specialist  When to call [address]. Importance and benefits of: Follow up with PCP and specialist, medication adherence, self monitoring and reporting of symptoms. Plan:  Patient states she feels she has all necessary care in place, appreciative of call but does not identify any further ACM needs.    AC provided contact information for

## 2023-09-20 NOTE — ED PROVIDER NOTES
Care transferred to me from Dr. Parish Hinson at shift change. Ms. Shyla Aj does not appear acutely ill on reevaluation. She reports that her presenting symptoms have improved and are well-controlled now. On my exam abdomen is soft, NT, ND, +BS  CT imaging is suspicious for both colitis and constipation. Her ED evaluation results do not show other acute or emergent findings. Discussed exam findings, test results, Dxs, and expected clinical course with her at length. Also discussed the R/B of ongoing inpatient versus outpatient management of her conditions. She prefers outpatient management and would like to be DC home now. Agree that this is reasonable. Dr. Parish Hinson already ordered a 2,000cc polyethylene glycol solution fo her - advised that she can continue to use this at home for constipation. Began a course of amox/clav for colitis. Return precautions reviewed in detail and outpatient follow up advised. US DUP ABD PEL RETRO SCROT COMPLETE   Final Result   1. No suspicious left ovarian mass or torsion. 2. Right ovary not seen. 3. Hysterectomy. US PELVIS COMPLETE   Final Result   1. No suspicious left ovarian mass or torsion. 2. Right ovary not seen. 3. Hysterectomy. CT ABDOMEN PELVIS W IV CONTRAST Additional Contrast? None   Final Result   There is a moderate to severely elevated stool burden, suggesting   constipation and stasis. There is mild mural thickening of the large bowel, especially in the region   of the transverse colon. That probably reflects primary infectious or   inflammatory colitis, but a component of stercoral colitis remains in the   differential.           1. Lower abdominal pain    2. Fever in adult    3. Colitis    4.  Constipation, unspecified constipation type      Discharge Medication List as of 9/14/2023  2:17 AM        START taking these medications    Details   amoxicillin-clavulanate (AUGMENTIN) 875-125 MG per tablet Take 1 tablet by mouth 2 times daily for

## 2023-09-27 PROBLEM — N39.0 UTI (URINARY TRACT INFECTION): Status: RESOLVED | Noted: 2023-08-28 | Resolved: 2023-09-27

## 2023-09-28 DIAGNOSIS — B00.2 RECURRENT ORAL HERPES SIMPLEX: ICD-10-CM

## 2023-09-29 RX ORDER — VALACYCLOVIR HYDROCHLORIDE 500 MG/1
500 TABLET, FILM COATED ORAL DAILY
Qty: 90 TABLET | Refills: 1 | Status: SHIPPED | OUTPATIENT
Start: 2023-09-29

## 2023-09-29 NOTE — TELEPHONE ENCOUNTER
Recent Visits  Date Type Provider Dept   09/06/23 Office Visit Jie Valderrama MD [facility]   05/05/23 Office Visit Jie Valderrama MD [facility]   02/03/23 Office Visit Jie Valderrama MD [facility]   11/14/22 Office Visit Jie Valderrama MD [facility]   09/14/22 Office Visit Jie Valderrama MD [facility]   Showing recent visits within past 540 days with a meds authorizing provider and meeting all other requirements  Future Appointments  No visits were found meeting these conditions.   Showing future appointments within next 150 days with a meds authorizing provider and meeting all other requirements     9/6/2023

## 2023-10-10 DIAGNOSIS — Z23 FLU VACCINE NEED: Primary | ICD-10-CM

## 2023-10-10 PROCEDURE — 90674 CCIIV4 VAC NO PRSV 0.5 ML IM: CPT | Performed by: NURSE PRACTITIONER

## 2023-10-10 PROCEDURE — 90471 IMMUNIZATION ADMIN: CPT | Performed by: NURSE PRACTITIONER

## 2023-10-17 ENCOUNTER — HOSPITAL ENCOUNTER (OUTPATIENT)
Dept: WOMENS IMAGING | Age: 43
Discharge: HOME | End: 2023-10-17
Payer: COMMERCIAL

## 2023-10-17 ENCOUNTER — HOSPITAL ENCOUNTER (OUTPATIENT)
Dept: ULTRASOUND IMAGING | Age: 43
Discharge: HOME | End: 2023-10-17
Payer: COMMERCIAL

## 2023-10-17 DIAGNOSIS — R92.8 ABNORMAL MAMMOGRAM: ICD-10-CM

## 2023-10-17 PROCEDURE — G0279 TOMOSYNTHESIS, MAMMO: HCPCS

## 2023-10-17 PROCEDURE — 76642 ULTRASOUND BREAST LIMITED: CPT

## 2023-10-27 DIAGNOSIS — L03.115 CELLULITIS OF RIGHT LOWER EXTREMITY: Primary | ICD-10-CM

## 2023-10-27 RX ORDER — BUPROPION HYDROCHLORIDE 150 MG/1
300 TABLET ORAL EVERY MORNING
Qty: 180 TABLET | Refills: 1 | Status: SHIPPED | OUTPATIENT
Start: 2023-10-27

## 2023-10-27 RX ORDER — SULFAMETHOXAZOLE AND TRIMETHOPRIM 800; 160 MG/1; MG/1
1 TABLET ORAL 2 TIMES DAILY
Qty: 20 TABLET | Refills: 0 | Status: SHIPPED | OUTPATIENT
Start: 2023-10-27 | End: 2023-11-06

## 2023-10-27 RX ORDER — FUROSEMIDE 20 MG/1
20 TABLET ORAL DAILY
Qty: 90 TABLET | Refills: 1 | Status: SHIPPED | OUTPATIENT
Start: 2023-10-27

## 2023-10-31 ENCOUNTER — E-VISIT (OUTPATIENT)
Dept: PRIMARY CARE CLINIC | Age: 43
End: 2023-10-31
Payer: COMMERCIAL

## 2023-10-31 DIAGNOSIS — U07.1 COVID: Primary | ICD-10-CM

## 2023-10-31 PROCEDURE — 99422 OL DIG E/M SVC 11-20 MIN: CPT | Performed by: NURSE PRACTITIONER

## 2023-10-31 ASSESSMENT — LIFESTYLE VARIABLES
SMOKING_YEARS: 15
SMOKING_STATUS: NO, BUT I USED TO SMOKE
PACKS_PER_DAY: 1

## 2023-10-31 NOTE — PROGRESS NOTES
Reviewed questionnaire    Reviewed meds/allergies    Dx Covid    Plan Rx given for paxlovid, follow up with PCP if no improvement    Time spent on visit 11 min

## 2023-11-24 DIAGNOSIS — F41.9 ANXIETY: ICD-10-CM

## 2023-11-26 NOTE — TELEPHONE ENCOUNTER
Recent Visits  Date Type Provider Dept   09/06/23 Office Visit Kerrie Monroe MD [facility]   05/05/23 Office Visit Kerrie Monroe MD [facility]   02/03/23 Office Visit Kerrie Monroe MD [facility]   11/14/22 Office Visit Kerrie Monroe MD [facility]   09/14/22 Office Visit Kerrie Monroe MD [facility]   Showing recent visits within past 540 days with a meds authorizing provider and meeting all other requirements  Future Appointments  No visits were found meeting these conditions.   Showing future appointments within next 150 days with a meds authorizing provider and meeting all other requirements     9/6/2023

## 2023-11-27 RX ORDER — ALPRAZOLAM 0.5 MG/1
TABLET ORAL
Qty: 15 TABLET | Refills: 1 | Status: SHIPPED | OUTPATIENT
Start: 2023-11-27 | End: 2023-12-27

## 2024-01-12 ENCOUNTER — HOSPITAL ENCOUNTER (OUTPATIENT)
Dept: CT IMAGING | Age: 44
Discharge: HOME | End: 2024-01-12
Attending: INTERNAL MEDICINE
Payer: COMMERCIAL

## 2024-01-12 ENCOUNTER — OFFICE VISIT (OUTPATIENT)
Dept: INTERNAL MEDICINE CLINIC | Age: 44
End: 2024-01-12
Payer: COMMERCIAL

## 2024-01-12 ENCOUNTER — HOSPITAL ENCOUNTER (OUTPATIENT)
Age: 44
Discharge: HOME | End: 2024-01-12
Attending: INTERNAL MEDICINE
Payer: COMMERCIAL

## 2024-01-12 VITALS
DIASTOLIC BLOOD PRESSURE: 76 MMHG | TEMPERATURE: 97.4 F | HEART RATE: 97 BPM | OXYGEN SATURATION: 98 % | SYSTOLIC BLOOD PRESSURE: 120 MMHG

## 2024-01-12 DIAGNOSIS — R10.11 RIGHT UPPER QUADRANT ABDOMINAL PAIN: ICD-10-CM

## 2024-01-12 DIAGNOSIS — R10.11 RIGHT UPPER QUADRANT ABDOMINAL PAIN: Primary | ICD-10-CM

## 2024-01-12 DIAGNOSIS — F33.1 MODERATE EPISODE OF RECURRENT MAJOR DEPRESSIVE DISORDER (HCC): ICD-10-CM

## 2024-01-12 PROBLEM — F39 MOOD DISORDER (HCC): Status: RESOLVED | Noted: 2023-08-28 | Resolved: 2024-01-12

## 2024-01-12 LAB
ALBUMIN SERPL-MCNC: 4.5 G/DL (ref 3.4–5)
ALBUMIN/GLOB SERPL: 1.6 {RATIO} (ref 1.1–2.2)
ALP SERPL-CCNC: 56 U/L (ref 40–129)
ALT SERPL-CCNC: 15 U/L (ref 10–40)
ANION GAP SERPL CALCULATED.3IONS-SCNC: 13 MMOL/L (ref 3–16)
AST SERPL-CCNC: 14 U/L (ref 15–37)
BASOPHILS # BLD: 0.1 K/UL (ref 0–0.2)
BASOPHILS NFR BLD: 1.1 %
BILIRUB SERPL-MCNC: 0.3 MG/DL (ref 0–1)
BUN SERPL-MCNC: 8 MG/DL (ref 7–20)
CALCIUM SERPL-MCNC: 9.5 MG/DL (ref 8.3–10.6)
CHLORIDE SERPL-SCNC: 102 MMOL/L (ref 99–110)
CO2 SERPL-SCNC: 23 MMOL/L (ref 21–32)
CREAT SERPL-MCNC: 0.6 MG/DL (ref 0.6–1.1)
DEPRECATED RDW RBC AUTO: 12.5 % (ref 12.4–15.4)
EOSINOPHIL # BLD: 0.3 K/UL (ref 0–0.6)
EOSINOPHIL NFR BLD: 4.5 %
GFR SERPLBLD CREATININE-BSD FMLA CKD-EPI: >60 ML/MIN/{1.73_M2}
GLUCOSE SERPL-MCNC: 92 MG/DL (ref 70–99)
HCT VFR BLD AUTO: 42.1 % (ref 36–48)
HGB BLD-MCNC: 14.8 G/DL (ref 12–16)
LIPASE SERPL-CCNC: 27 U/L (ref 13–60)
LYMPHOCYTES # BLD: 2.3 K/UL (ref 1–5.1)
LYMPHOCYTES NFR BLD: 38.7 %
MCH RBC QN AUTO: 33.4 PG (ref 26–34)
MCHC RBC AUTO-ENTMCNC: 35.2 G/DL (ref 31–36)
MCV RBC AUTO: 94.9 FL (ref 80–100)
MONOCYTES # BLD: 0.4 K/UL (ref 0–1.3)
MONOCYTES NFR BLD: 6.2 %
NEUTROPHILS # BLD: 2.9 K/UL (ref 1.7–7.7)
NEUTROPHILS NFR BLD: 49.5 %
PLATELET # BLD AUTO: 279 K/UL (ref 135–450)
PMV BLD AUTO: 9.2 FL (ref 5–10.5)
POTASSIUM SERPL-SCNC: 3.6 MMOL/L (ref 3.5–5.1)
PROT SERPL-MCNC: 7.4 G/DL (ref 6.4–8.2)
RBC # BLD AUTO: 4.44 M/UL (ref 4–5.2)
SODIUM SERPL-SCNC: 138 MMOL/L (ref 136–145)
WBC # BLD AUTO: 5.9 K/UL (ref 4–11)

## 2024-01-12 PROCEDURE — 74160 CT ABDOMEN W/CONTRAST: CPT

## 2024-01-12 PROCEDURE — 80053 COMPREHEN METABOLIC PANEL: CPT

## 2024-01-12 PROCEDURE — 83690 ASSAY OF LIPASE: CPT

## 2024-01-12 PROCEDURE — 99213 OFFICE O/P EST LOW 20 MIN: CPT | Performed by: INTERNAL MEDICINE

## 2024-01-12 PROCEDURE — 36415 COLL VENOUS BLD VENIPUNCTURE: CPT

## 2024-01-12 PROCEDURE — 6360000004 HC RX CONTRAST MEDICATION: Performed by: INTERNAL MEDICINE

## 2024-01-12 PROCEDURE — 85025 COMPLETE CBC W/AUTO DIFF WBC: CPT

## 2024-01-12 RX ADMIN — IOHEXOL 50 ML: 240 INJECTION, SOLUTION INTRATHECAL; INTRAVASCULAR; INTRAVENOUS; ORAL at 19:15

## 2024-01-12 RX ADMIN — IOPAMIDOL 75 ML: 755 INJECTION, SOLUTION INTRAVENOUS at 19:16

## 2024-01-12 ASSESSMENT — PATIENT HEALTH QUESTIONNAIRE - PHQ9
SUM OF ALL RESPONSES TO PHQ QUESTIONS 1-9: 0
8. MOVING OR SPEAKING SO SLOWLY THAT OTHER PEOPLE COULD HAVE NOTICED. OR THE OPPOSITE, BEING SO FIGETY OR RESTLESS THAT YOU HAVE BEEN MOVING AROUND A LOT MORE THAN USUAL: 0
SUM OF ALL RESPONSES TO PHQ QUESTIONS 1-9: 0
10. IF YOU CHECKED OFF ANY PROBLEMS, HOW DIFFICULT HAVE THESE PROBLEMS MADE IT FOR YOU TO DO YOUR WORK, TAKE CARE OF THINGS AT HOME, OR GET ALONG WITH OTHER PEOPLE: 0
2. FEELING DOWN, DEPRESSED OR HOPELESS: 0
3. TROUBLE FALLING OR STAYING ASLEEP: 0
1. LITTLE INTEREST OR PLEASURE IN DOING THINGS: 0
6. FEELING BAD ABOUT YOURSELF - OR THAT YOU ARE A FAILURE OR HAVE LET YOURSELF OR YOUR FAMILY DOWN: 0
5. POOR APPETITE OR OVEREATING: 0
SUM OF ALL RESPONSES TO PHQ9 QUESTIONS 1 & 2: 0
9. THOUGHTS THAT YOU WOULD BE BETTER OFF DEAD, OR OF HURTING YOURSELF: 0
4. FEELING TIRED OR HAVING LITTLE ENERGY: 0
SUM OF ALL RESPONSES TO PHQ QUESTIONS 1-9: 0
SUM OF ALL RESPONSES TO PHQ QUESTIONS 1-9: 0
7. TROUBLE CONCENTRATING ON THINGS, SUCH AS READING THE NEWSPAPER OR WATCHING TELEVISION: 0

## 2024-01-12 NOTE — PROGRESS NOTES
Chief Complaint   Patient presents with    Bloated     2 week history    Abdominal Pain       Assessment/Plan:  Brooke was seen today for bloated and abdominal pain.    Diagnoses and all orders for this visit:    Right upper quadrant abdominal pain  -     Comprehensive Metabolic Panel  -     CBC with Auto Differential  -     Lipase  -     CT ABDOMEN W CONTRAST; Future    Moderate episode of recurrent major depressive disorder (HCC)  Comments:  She is still taking Wellbutrin        Vitals:    01/12/24 1612   BP: 120/76   Pulse: 97   Temp: 97.4 °F (36.3 °C)   SpO2: 98%       Physical Exam  Vitals reviewed.   Constitutional:       General: She is not in acute distress.     Appearance: She is well-developed. She is not diaphoretic.   HENT:      Head: Normocephalic and atraumatic.   Pulmonary:      Effort: Pulmonary effort is normal.   Abdominal:      General: Abdomen is flat.      Tenderness: There is abdominal tenderness (Right upper quadrant and epigastric tenderness to palpation).   Neurological:      Mental Status: She is alert and oriented to person, place, and time.      Cranial Nerves: No cranial nerve deficit.   Psychiatric:         Behavior: Behavior normal.         Thought Content: Thought content normal.         Judgment: Judgment normal.             1/12/2024     4:12 PM 9/6/2023     1:48 PM 5/5/2023     3:50 PM 2/3/2023     9:00 AM 2/3/2023     8:59 AM 1/30/2023     1:00 PM 11/14/2022     2:34 PM   PHQ Scores   PHQ2 Score 0 0 0 0 0 0 0   PHQ9 Score 0 0 0 0 0 0 0     Interpretation of Total Score Depression Severity: 1-4 = Minimal depression, 5-9 = Mild depression, 10-14 = Moderate depression, 15-19 = Moderately severe depression, 20-27 = Severe depression    Dileep Kelly MD  FACP

## 2024-01-15 DIAGNOSIS — R10.13 EPIGASTRIC ABDOMINAL PAIN: Primary | ICD-10-CM

## 2024-02-09 ENCOUNTER — E-VISIT (OUTPATIENT)
Dept: PRIMARY CARE CLINIC | Age: 44
End: 2024-02-09
Payer: COMMERCIAL

## 2024-02-09 DIAGNOSIS — B34.9 ACUTE VIRAL SYNDROME: Primary | ICD-10-CM

## 2024-02-09 PROCEDURE — 99422 OL DIG E/M SVC 11-20 MIN: CPT | Performed by: NURSE PRACTITIONER

## 2024-02-09 RX ORDER — OSELTAMIVIR PHOSPHATE 75 MG/1
75 CAPSULE ORAL 2 TIMES DAILY
Qty: 10 CAPSULE | Refills: 0 | Status: SHIPPED | OUTPATIENT
Start: 2024-02-09 | End: 2024-02-14

## 2024-02-09 ASSESSMENT — LIFESTYLE VARIABLES
SMOKING_STATUS: NO, BUT I USED TO SMOKE
SMOKING_YEARS: 15
PACKS_PER_DAY: 1

## 2024-02-09 NOTE — PROGRESS NOTES
Brooke Gomez (1980) initiated an asynchronous digital communication through [employer]eo.    HPI: per patient questionnaire     Exam: not applicable    Diagnoses and all orders for this visit:  Diagnoses and all orders for this visit:    Acute viral syndrome    Other orders  -     oseltamivir (TAMIFLU) 75 MG capsule; Take 1 capsule by mouth 2 times daily for 5 days      Exposed to influenza a and b. Antiviral sent. Supportive care measures provided. F/u with pcp as needed     Time: EV2 - 11-20 minutes were spent on the digital evaluation and management of this patient. 18 min     RICA Holguin - CNP

## 2024-02-28 DIAGNOSIS — G43.011 INTRACTABLE MIGRAINE WITHOUT AURA AND WITH STATUS MIGRAINOSUS: ICD-10-CM

## 2024-02-28 NOTE — TELEPHONE ENCOUNTER
Patient calling the office with reports of Ubrelvy and Nurtec not being covered.    Patient requesting a refill of Maxalt be sent to pharmacy.Medication:   Medication:   Requested Prescriptions     Pending Prescriptions Disp Refills    rizatriptan (MAXALT) 10 MG tablet 9 tablet 3     Si at onset May repeat in 2 hours x2 within 24 hrs if needed for HA       Last Filled:  23    Patient Phone Number: [phone] (home)     Last appt: Visit date not found   Next appt: Visit date not found

## 2024-02-29 RX ORDER — RIZATRIPTAN BENZOATE 10 MG/1
TABLET ORAL
Qty: 9 TABLET | Refills: 3 | Status: SHIPPED | OUTPATIENT
Start: 2024-02-29

## 2024-03-28 DIAGNOSIS — B00.2 RECURRENT ORAL HERPES SIMPLEX: ICD-10-CM

## 2024-03-28 NOTE — TELEPHONE ENCOUNTER
Recent Visits  Date Type Provider Dept   01/12/24 Office Visit Dileep Kelly MD [facility]   09/06/23 Office Visit Dileep Kelly MD [facility]   05/05/23 Office Visit Dileep Kelly MD [facility]   02/03/23 Office Visit Dileep Kelly MD [facility]   11/14/22 Office Visit Dileep Kelly MD [facility]   Showing recent visits within past 540 days with a meds authorizing provider and meeting all other requirements  Future Appointments  No visits were found meeting these conditions.  Showing future appointments within next 150 days with a meds authorizing provider and meeting all other requirements     1/12/2024

## 2024-03-29 RX ORDER — VALACYCLOVIR HYDROCHLORIDE 500 MG/1
500 TABLET, FILM COATED ORAL DAILY
Qty: 90 TABLET | Refills: 1 | Status: SHIPPED | OUTPATIENT
Start: 2024-03-29

## 2024-04-03 DIAGNOSIS — N63.20 MASS OF LEFT BREAST, UNSPECIFIED QUADRANT: Primary | ICD-10-CM

## 2024-04-05 DIAGNOSIS — N63.20 MASS OF LEFT BREAST, UNSPECIFIED QUADRANT: Primary | ICD-10-CM

## 2024-04-09 ENCOUNTER — HOSPITAL ENCOUNTER (OUTPATIENT)
Dept: NUCLEAR MEDICINE | Age: 44
Discharge: HOME | End: 2024-04-09
Attending: INTERNAL MEDICINE
Payer: COMMERCIAL

## 2024-04-09 VITALS — BODY MASS INDEX: 24.69 KG/M2 | WEIGHT: 135 LBS

## 2024-04-09 DIAGNOSIS — R10.13 EPIGASTRIC ABDOMINAL PAIN: ICD-10-CM

## 2024-04-09 PROCEDURE — 6360000004 HC RX CONTRAST MEDICATION: Performed by: INTERNAL MEDICINE

## 2024-04-09 PROCEDURE — A9537 TC99M MEBROFENIN: HCPCS | Performed by: INTERNAL MEDICINE

## 2024-04-09 PROCEDURE — 2580000003 HC RX 258: Performed by: INTERNAL MEDICINE

## 2024-04-09 PROCEDURE — 3430000000 HC RX DIAGNOSTIC RADIOPHARMACEUTICAL: Performed by: INTERNAL MEDICINE

## 2024-04-09 PROCEDURE — 78227 HEPATOBIL SYST IMAGE W/DRUG: CPT

## 2024-04-09 RX ORDER — SINCALIDE 5 UG/5ML
0.02 INJECTION, POWDER, LYOPHILIZED, FOR SOLUTION INTRAVENOUS ONCE
Status: COMPLETED | OUTPATIENT
Start: 2024-04-09 | End: 2024-04-09

## 2024-04-09 RX ORDER — SODIUM CHLORIDE 9 MG/ML
INJECTION, SOLUTION INTRAVENOUS ONCE
Status: COMPLETED | OUTPATIENT
Start: 2024-04-09 | End: 2024-04-09

## 2024-04-09 RX ORDER — SODIUM CHLORIDE 0.9 % (FLUSH) 0.9 %
5-40 SYRINGE (ML) INJECTION PRN
Status: DISCONTINUED | OUTPATIENT
Start: 2024-04-09 | End: 2024-04-10 | Stop reason: HOSPADM

## 2024-04-09 RX ADMIN — SODIUM CHLORIDE: 9 INJECTION, SOLUTION INTRAVENOUS at 13:52

## 2024-04-09 RX ADMIN — SINCALIDE 1.22 MCG: 5 INJECTION, POWDER, LYOPHILIZED, FOR SOLUTION INTRAVENOUS at 13:51

## 2024-04-09 RX ADMIN — SODIUM CHLORIDE, PRESERVATIVE FREE 10 ML: 5 INJECTION INTRAVENOUS at 12:52

## 2024-04-09 RX ADMIN — Medication 4.82 MILLICURIE: at 12:52

## 2024-05-02 ENCOUNTER — OFFICE VISIT (OUTPATIENT)
Dept: INTERNAL MEDICINE CLINIC | Age: 44
End: 2024-05-02
Payer: COMMERCIAL

## 2024-05-02 VITALS
OXYGEN SATURATION: 99 % | TEMPERATURE: 99.5 F | HEART RATE: 100 BPM | DIASTOLIC BLOOD PRESSURE: 84 MMHG | WEIGHT: 136 LBS | BODY MASS INDEX: 25.03 KG/M2 | HEIGHT: 62 IN | SYSTOLIC BLOOD PRESSURE: 122 MMHG

## 2024-05-02 DIAGNOSIS — J02.9 PHARYNGITIS, UNSPECIFIED ETIOLOGY: Primary | ICD-10-CM

## 2024-05-02 PROCEDURE — 99213 OFFICE O/P EST LOW 20 MIN: CPT | Performed by: NURSE PRACTITIONER

## 2024-05-02 RX ORDER — LIDOCAINE HYDROCHLORIDE 20 MG/ML
15 SOLUTION OROPHARYNGEAL PRN
Qty: 500 ML | Refills: 0 | Status: SHIPPED | OUTPATIENT
Start: 2024-05-02

## 2024-05-02 RX ORDER — METHYLPREDNISOLONE 4 MG/1
TABLET ORAL
Qty: 1 KIT | Refills: 0 | Status: SHIPPED | OUTPATIENT
Start: 2024-05-02 | End: 2024-05-08

## 2024-05-02 SDOH — ECONOMIC STABILITY: INCOME INSECURITY: HOW HARD IS IT FOR YOU TO PAY FOR THE VERY BASICS LIKE FOOD, HOUSING, MEDICAL CARE, AND HEATING?: NOT HARD AT ALL

## 2024-05-02 SDOH — ECONOMIC STABILITY: FOOD INSECURITY: WITHIN THE PAST 12 MONTHS, THE FOOD YOU BOUGHT JUST DIDN'T LAST AND YOU DIDN'T HAVE MONEY TO GET MORE.: NEVER TRUE

## 2024-05-02 SDOH — ECONOMIC STABILITY: FOOD INSECURITY: WITHIN THE PAST 12 MONTHS, YOU WORRIED THAT YOUR FOOD WOULD RUN OUT BEFORE YOU GOT MONEY TO BUY MORE.: NEVER TRUE

## 2024-05-02 ASSESSMENT — ENCOUNTER SYMPTOMS
RESPIRATORY NEGATIVE: 1
SORE THROAT: 1
SINUS PRESSURE: 0
SINUS PAIN: 0

## 2024-05-02 NOTE — PROGRESS NOTES
SUBJECTIVE:    Patient ID: Brooke Gomez is a 43 y.o. female.    CC: Sore throat, ear pain    HPI: The patient presents to the office for an acute visit.    3-day history of sore throat and ear pain.  Sore throat has been severe.  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  She has a low-grade temp.  Some sinus tenderness.  No sinus congestion.  No lower respiratory symptoms.  She has tried treatments at home and taking pain medication at home because of the severity of her sore throat.  Voice is normal and she feels she is projecting her voice normally.      Current Outpatient Medications   Medication Sig Dispense Refill    methylPREDNISolone (MEDROL, ALFREDO,) 4 MG tablet Take as directed 1 kit 0    lidocaine viscous hcl (XYLOCAINE) 2 % SOLN solution Take 15 mLs by mouth as needed for Irritation or Pain 500 mL 0    valACYclovir (VALTREX) 500 MG tablet TAKE ONE TABLET BY MOUTH ONCE A DAY 90 tablet 1    rizatriptan (MAXALT) 10 MG tablet 1 at onset May repeat in 2 hours x2 within 24 hrs if needed for HA 9 tablet 3    esomeprazole (NEXIUM) 20 MG delayed release capsule Take 1 capsule by mouth daily 90 capsule 3    furosemide (LASIX) 20 MG tablet TAKE ONE TABLET BY MOUTH ONE TIME A DAY 90 tablet 1    buPROPion (WELLBUTRIN XL) 150 MG extended release tablet Take 2 tablets by mouth every morning 180 tablet 1    albuterol sulfate HFA (PROVENTIL HFA) 108 (90 Base) MCG/ACT inhaler Inhale 1 puff into the lungs every 4 hours as needed for Wheezing or Shortness of Breath 1 Inhaler 5     No current facility-administered medications for this visit.          Review of Systems   Constitutional:  Positive for chills and fever.   HENT:  Positive for congestion, ear pain and sore throat. Negative for hearing loss, sinus pressure and sinus pain.    Respiratory: Negative.           OBJECTIVE:  Physical Exam  Constitutional:       General: She is not in acute distress.

## 2024-05-13 ENCOUNTER — HOSPITAL ENCOUNTER (EMERGENCY)
Age: 44
Discharge: HOME | End: 2024-05-13
Attending: EMERGENCY MEDICINE
Payer: COMMERCIAL

## 2024-05-13 ENCOUNTER — NURSE ONLY (OUTPATIENT)
Dept: INTERNAL MEDICINE CLINIC | Age: 44
End: 2024-05-13
Payer: COMMERCIAL

## 2024-05-13 VITALS
RESPIRATION RATE: 16 BRPM | TEMPERATURE: 97.9 F | HEART RATE: 87 BPM | SYSTOLIC BLOOD PRESSURE: 126 MMHG | DIASTOLIC BLOOD PRESSURE: 89 MMHG | HEIGHT: 62 IN | OXYGEN SATURATION: 100 % | BODY MASS INDEX: 25.36 KG/M2 | WEIGHT: 137.8 LBS

## 2024-05-13 DIAGNOSIS — G43.011 INTRACTABLE MIGRAINE WITHOUT AURA AND WITH STATUS MIGRAINOSUS: Primary | ICD-10-CM

## 2024-05-13 DIAGNOSIS — G44.209 ACUTE NON INTRACTABLE TENSION-TYPE HEADACHE: Primary | ICD-10-CM

## 2024-05-13 PROCEDURE — 6360000002 HC RX W HCPCS

## 2024-05-13 PROCEDURE — 96372 THER/PROPH/DIAG INJ SC/IM: CPT | Performed by: NURSE PRACTITIONER

## 2024-05-13 PROCEDURE — 6370000000 HC RX 637 (ALT 250 FOR IP): Performed by: EMERGENCY MEDICINE

## 2024-05-13 PROCEDURE — 2580000003 HC RX 258: Performed by: EMERGENCY MEDICINE

## 2024-05-13 PROCEDURE — 96374 THER/PROPH/DIAG INJ IV PUSH: CPT

## 2024-05-13 PROCEDURE — 96375 TX/PRO/DX INJ NEW DRUG ADDON: CPT

## 2024-05-13 PROCEDURE — 99284 EMERGENCY DEPT VISIT MOD MDM: CPT

## 2024-05-13 PROCEDURE — 6360000002 HC RX W HCPCS: Performed by: EMERGENCY MEDICINE

## 2024-05-13 RX ORDER — 0.9 % SODIUM CHLORIDE 0.9 %
1000 INTRAVENOUS SOLUTION INTRAVENOUS ONCE
Status: COMPLETED | OUTPATIENT
Start: 2024-05-13 | End: 2024-05-13

## 2024-05-13 RX ORDER — METOCLOPRAMIDE HYDROCHLORIDE 5 MG/ML
INJECTION INTRAMUSCULAR; INTRAVENOUS
Status: COMPLETED
Start: 2024-05-13 | End: 2024-05-13

## 2024-05-13 RX ORDER — DEXAMETHASONE SODIUM PHOSPHATE 10 MG/ML
10 INJECTION INTRAMUSCULAR; INTRAVENOUS ONCE
Status: COMPLETED | OUTPATIENT
Start: 2024-05-13 | End: 2024-05-13

## 2024-05-13 RX ORDER — METOCLOPRAMIDE HYDROCHLORIDE 5 MG/ML
10 INJECTION INTRAMUSCULAR; INTRAVENOUS ONCE
Status: COMPLETED | OUTPATIENT
Start: 2024-05-13 | End: 2024-05-13

## 2024-05-13 RX ORDER — DIPHENHYDRAMINE HYDROCHLORIDE 50 MG/ML
25 INJECTION INTRAMUSCULAR; INTRAVENOUS ONCE
Status: COMPLETED | OUTPATIENT
Start: 2024-05-13 | End: 2024-05-13

## 2024-05-13 RX ORDER — KETOROLAC TROMETHAMINE 30 MG/ML
60 INJECTION, SOLUTION INTRAMUSCULAR; INTRAVENOUS ONCE
Status: COMPLETED | OUTPATIENT
Start: 2024-05-13 | End: 2024-05-13

## 2024-05-13 RX ORDER — PROCHLORPERAZINE EDISYLATE 5 MG/ML
10 INJECTION INTRAMUSCULAR; INTRAVENOUS ONCE
Status: DISCONTINUED | OUTPATIENT
Start: 2024-05-13 | End: 2024-05-13 | Stop reason: HOSPADM

## 2024-05-13 RX ORDER — ACETAMINOPHEN 325 MG/1
650 TABLET ORAL ONCE
Status: COMPLETED | OUTPATIENT
Start: 2024-05-13 | End: 2024-05-13

## 2024-05-13 RX ADMIN — KETOROLAC TROMETHAMINE 60 MG: 30 INJECTION, SOLUTION INTRAMUSCULAR; INTRAVENOUS at 08:30

## 2024-05-13 RX ADMIN — DIPHENHYDRAMINE HYDROCHLORIDE 25 MG: 50 INJECTION INTRAMUSCULAR; INTRAVENOUS at 09:15

## 2024-05-13 RX ADMIN — DEXAMETHASONE SODIUM PHOSPHATE 10 MG: 10 INJECTION INTRAMUSCULAR; INTRAVENOUS at 09:16

## 2024-05-13 RX ADMIN — METOCLOPRAMIDE HYDROCHLORIDE 10 MG: 5 INJECTION INTRAMUSCULAR; INTRAVENOUS at 09:24

## 2024-05-13 RX ADMIN — ACETAMINOPHEN 650 MG: 325 TABLET ORAL at 09:16

## 2024-05-13 RX ADMIN — SODIUM CHLORIDE 1000 ML: 9 INJECTION, SOLUTION INTRAVENOUS at 09:16

## 2024-05-13 RX ADMIN — METOCLOPRAMIDE 10 MG: 5 INJECTION, SOLUTION INTRAMUSCULAR; INTRAVENOUS at 09:24

## 2024-05-13 ASSESSMENT — ENCOUNTER SYMPTOMS
BACK PAIN: 0
NAUSEA: 0
RHINORRHEA: 0
CONSTIPATION: 0
DIARRHEA: 0
EYE PAIN: 0
ABDOMINAL PAIN: 0
COUGH: 0
VOMITING: 0
EYE REDNESS: 0

## 2024-05-13 ASSESSMENT — PAIN SCALES - GENERAL
PAINLEVEL_OUTOF10: 0
PAINLEVEL_OUTOF10: 8

## 2024-05-13 ASSESSMENT — PAIN DESCRIPTION - DESCRIPTORS: DESCRIPTORS: DISCOMFORT

## 2024-05-13 ASSESSMENT — PAIN DESCRIPTION - LOCATION: LOCATION: HEAD

## 2024-05-13 ASSESSMENT — PAIN - FUNCTIONAL ASSESSMENT: PAIN_FUNCTIONAL_ASSESSMENT: 0-10

## 2024-05-13 ASSESSMENT — PAIN DESCRIPTION - PAIN TYPE: TYPE: ACUTE PAIN

## 2024-05-13 NOTE — ED PROVIDER NOTES
are mis-transcribed.)    Robina Ocasio DO (electronically signed)  Attending Emergency Physician        Robina Ocasio DO  05/13/24 7501

## 2024-05-14 ENCOUNTER — CARE COORDINATION (OUTPATIENT)
Dept: OTHER | Facility: CLINIC | Age: 44
End: 2024-05-14

## 2024-05-14 RX ORDER — DIPHENHYDRAMINE HCL 25 MG
25 CAPSULE ORAL EVERY 6 HOURS PRN
COMMUNITY

## 2024-05-14 NOTE — CARE COORDINATION
Dileep GUO MD   furosemide (LASIX) 20 MG tablet TAKE ONE TABLET BY MOUTH ONE TIME A DAY Yes Dileep Kelly MD   buPROPion (WELLBUTRIN XL) 150 MG extended release tablet Take 2 tablets by mouth every morning Yes Dileep Kelly MD   albuterol sulfate HFA (PROVENTIL HFA) 108 (90 Base) MCG/ACT inhaler Inhale 1 puff into the lungs every 4 hours as needed for Wheezing or Shortness of Breath Yes Dileep Kelly MD        Care Transitions ED Follow Up    Care Transitions Interventions    Pharmacist: Completed    Schedule Follow Up Appointment with Physician: Declined  Do you have any ongoing symptoms?: No   Did you call your PCP prior to going to the ED?: Yes - Advised to go to the ED   Do you have all of your prescriptions and are they filled?: Yes   Were you discharged with any Home Care or Post Acute Services or do you currently have any active services?: No         Do you have any needs or concerns that I can assist you with?: Yes   Identified Barriers: Other

## 2024-05-15 ENCOUNTER — TELEPHONE (OUTPATIENT)
Dept: PHARMACY | Facility: CLINIC | Age: 44
End: 2024-05-15

## 2024-05-15 NOTE — TELEPHONE ENCOUNTER
Received a referral:  from Care Coordinator to review patient’s medications. Called patient to schedule a time to speak with a pharmacist over the telephone.   Spoke to patient and advised them of the above message.  Patient verified understanding and scheduled their appointment: tomorrow at 9AM      Sahyy Fong CPhT.   Population Health Clinical   [facility]  Toll free: [phone]    For Pharmacy Admin Tracking Only    Program: [employer]  CPA in place:  No  Recommendation Provided To: Patient/Caregiver: 1 via Telephone  Intervention Detail: Scheduled Appointment  Intervention Accepted By: Patient/Caregiver: 1  Gap Closed?: Yes   Time Spent (min): 15

## 2024-05-15 NOTE — TELEPHONE ENCOUNTER
----- Message from Blanca Masters RN sent at 5/14/2024  5:54 PM EDT -----  Patient presented to ED on 5/13/24 due to inability to relieve migraine with home meds and Toradol.  Patient states unable to obtain many meds for migraines due to insurance.  Discussed step therapy.  Please outreach patient to assist.  Patient states has taken Topamax previously, reports adverse effects such as facial twitching.  Patient has Maxalt in the home but is taking 4 tablets per week.  Thank you.    Blanca Masters RN

## 2024-05-16 ENCOUNTER — TELEPHONE (OUTPATIENT)
Dept: PHARMACY | Facility: CLINIC | Age: 44
End: 2024-05-16

## 2024-05-16 NOTE — TELEPHONE ENCOUNTER
CLINICAL PHARMACY NOTE - Medication Review    Brooke Gomez is a 43 y.o. female referred to a clinical pharmacy specialist by care coordination given their history of migraines.    2 attempts made to reach patient by telephone for scheduled medication review. Left voice message for patient to return clinician's phone call to [phone] opt 1.    Not sure of patient's insurance, however here is a summary of headache treatments/prophylaxis.     Headache Treatment    Non-Specialty Medications: Acetaminophen   - Acetaminophen/Aspirin/Caffeine   - NSAIDs (ASA, Ibuprofen, Naproxen, Diclofenac)   - Dihydroergotamine (Spray, Injection) & Ergotamine tartrate (PO, Rectal)   - Serotonin 5-HT1B,1D Receptor Agonists (\"Triptans\")   ? Sumatriptan (Imitrex) PO, SubQ, Nasal   ? Zolmitriptan (Zomig) PO, ODT, Nasal   ? Naratriptan (Amerge) PO   ? Rizatriptan (Maxalt) PO, ODT   ? Almotriptan (Axert) PO   ? Frovatriptan (Frova) PO   ? Eletriptan (Relpax) PO     - Serotonin 5-HT1F Receptor Agonist   ? Lasmiditan (Reyvow) PO     - Anti-emetics (dopamine antagonist) plus diphenhydramine for akathisia/acute dystonic reactions   1. Prochlorperazine   2. Metoclopramide   3. Haloperidol, Chlorpromazine, Droperidol, Ondansetron (QTc-prolonging agents)     - Preventative   ? Beta-adrenergic Blockers- propranolol   ? Antidepressants (3o TCA, SNRI, amitriptyline, duloxetine, venlafaxine)   ? Anticonvulsants (Topiramate, Valproic Acid)     Specialty Medications:   - Emgality, Aimovig, Ajovy, Vyepti, Botox  - oral meds: Nurtec ODT, Ubrelvy, Qulipta    Thank you,  Latoya Hickey, PharmD, [facility]  [facility]  [facility] Clinical Pharmacist  Department: [phone]     For Pharmacy Admin Tracking Only    Program: [employer]  Time Spent (min): 10

## 2024-05-20 ENCOUNTER — TELEPHONE (OUTPATIENT)
Dept: INTERNAL MEDICINE CLINIC | Age: 44
End: 2024-05-20

## 2024-05-20 NOTE — TELEPHONE ENCOUNTER
Call placed to pt to let her know that Dr. Kelly wants to see her in office as opposed to e-visit. No answer. LVM for pt to call office back to schedule appt.

## 2024-05-21 ENCOUNTER — OFFICE VISIT (OUTPATIENT)
Dept: INTERNAL MEDICINE CLINIC | Age: 44
End: 2024-05-21
Payer: COMMERCIAL

## 2024-05-21 VITALS
OXYGEN SATURATION: 98 % | BODY MASS INDEX: 24.84 KG/M2 | HEART RATE: 63 BPM | DIASTOLIC BLOOD PRESSURE: 76 MMHG | HEIGHT: 62 IN | SYSTOLIC BLOOD PRESSURE: 110 MMHG | WEIGHT: 135 LBS

## 2024-05-21 DIAGNOSIS — J02.0 STREP PHARYNGITIS: Primary | ICD-10-CM

## 2024-05-21 PROCEDURE — 99213 OFFICE O/P EST LOW 20 MIN: CPT | Performed by: NURSE PRACTITIONER

## 2024-05-21 RX ORDER — AMOXICILLIN AND CLAVULANATE POTASSIUM 875; 125 MG/1; MG/1
1 TABLET, FILM COATED ORAL 2 TIMES DAILY
Qty: 20 TABLET | Refills: 0 | Status: SHIPPED | OUTPATIENT
Start: 2024-05-21 | End: 2024-05-31

## 2024-05-21 ASSESSMENT — ENCOUNTER SYMPTOMS
SORE THROAT: 1
COUGH: 0
SHORTNESS OF BREATH: 0
TROUBLE SWALLOWING: 0
RESPIRATORY NEGATIVE: 1
WHEEZING: 0

## 2024-05-22 ENCOUNTER — CARE COORDINATION (OUTPATIENT)
Dept: OTHER | Facility: CLINIC | Age: 44
End: 2024-05-22

## 2024-05-22 NOTE — CARE COORDINATION
Ambulatory Care Coordination Note    ACM outreached patient for care management follow up call regarding migraines, medications, Pharmacy Care Coordination, cc protocol. Patient unable to talk at this time.  Will return call to this [facility] Friday afternoon    Plan for follow-up call in 10-14 days    Future Appointments   Date Time Provider Department Center   6/10/2024  7:30 AM [facility] MAMMO RM 3 [facility]   6/10/2024  8:15 AM [facility] US RM 4 [facility]   7/1/2024 10:15 AM Dileep Kelly MD [facility]  [facility] SHARPE

## 2024-05-31 ENCOUNTER — CARE COORDINATION (OUTPATIENT)
Dept: OTHER | Facility: CLINIC | Age: 44
End: 2024-05-31

## 2024-05-31 NOTE — CARE COORDINATION
Ambulatory Care Coordination Note    ACM attempted to reach patient for care management follow up call regarding migraine, nausea, medication, care coordination needs, cc protocol, appt with PCP and neurology. VM greeting started then call answered and disconnected by answering party.     Plan for follow up call in one month.     Future Appointments   Date Time Provider Department Center   6/4/2024  1:00 PM Albino Obrien, APRN - CNP [facility] - DYD   6/10/2024  7:30 AM [facility] MAMMO RM 3 [facility] WOMENS [facility]   6/10/2024  8:15 AM [facility] US RM 4 [facility] ULTRA [facility]   7/1/2024 10:15 AM Dileep Kelly MD [facility]  [facility] - DYRAYSA

## 2024-06-04 ENCOUNTER — OFFICE VISIT (OUTPATIENT)
Dept: INTERNAL MEDICINE CLINIC | Age: 44
End: 2024-06-04
Payer: COMMERCIAL

## 2024-06-04 VITALS
HEIGHT: 62 IN | HEART RATE: 96 BPM | WEIGHT: 136 LBS | DIASTOLIC BLOOD PRESSURE: 70 MMHG | SYSTOLIC BLOOD PRESSURE: 98 MMHG | BODY MASS INDEX: 25.03 KG/M2 | OXYGEN SATURATION: 97 %

## 2024-06-04 DIAGNOSIS — F33.1 MODERATE EPISODE OF RECURRENT MAJOR DEPRESSIVE DISORDER (HCC): ICD-10-CM

## 2024-06-04 DIAGNOSIS — E88.810 METABOLIC SYNDROME: ICD-10-CM

## 2024-06-04 DIAGNOSIS — Z00.00 ANNUAL PHYSICAL EXAM: Primary | ICD-10-CM

## 2024-06-04 DIAGNOSIS — B00.2 RECURRENT ORAL HERPES SIMPLEX: ICD-10-CM

## 2024-06-04 DIAGNOSIS — Z12.11 COLON CANCER SCREENING: ICD-10-CM

## 2024-06-04 DIAGNOSIS — G43.011 INTRACTABLE MIGRAINE WITHOUT AURA AND WITH STATUS MIGRAINOSUS: ICD-10-CM

## 2024-06-04 PROCEDURE — 99214 OFFICE O/P EST MOD 30 MIN: CPT | Performed by: NURSE PRACTITIONER

## 2024-06-04 RX ORDER — VALACYCLOVIR HYDROCHLORIDE 500 MG/1
500 TABLET, FILM COATED ORAL DAILY
Qty: 90 TABLET | Refills: 3 | Status: SHIPPED | OUTPATIENT
Start: 2024-06-04

## 2024-06-04 RX ORDER — PROPRANOLOL HCL 60 MG
60 CAPSULE, EXTENDED RELEASE 24HR ORAL DAILY
Qty: 90 CAPSULE | Refills: 3 | Status: SHIPPED | OUTPATIENT
Start: 2024-06-04

## 2024-06-04 RX ORDER — BUPROPION HYDROCHLORIDE 150 MG/1
300 TABLET ORAL EVERY MORNING
Qty: 180 TABLET | Refills: 3 | Status: SHIPPED | OUTPATIENT
Start: 2024-06-04

## 2024-06-04 RX ORDER — ALPRAZOLAM 0.5 MG/1
0.5 TABLET ORAL NIGHTLY PRN
COMMUNITY

## 2024-06-04 RX ORDER — FUROSEMIDE 20 MG/1
20 TABLET ORAL DAILY
Qty: 90 TABLET | Refills: 3 | Status: SHIPPED | OUTPATIENT
Start: 2024-06-04

## 2024-06-04 RX ORDER — METFORMIN HYDROCHLORIDE 500 MG/1
500 TABLET, EXTENDED RELEASE ORAL DAILY
Qty: 90 TABLET | Refills: 1 | Status: SHIPPED | OUTPATIENT
Start: 2024-06-04

## 2024-06-04 RX ORDER — METFORMIN HYDROCHLORIDE 500 MG/1
500 TABLET, EXTENDED RELEASE ORAL
Qty: 30 TABLET | Refills: 5 | Status: SHIPPED | OUTPATIENT
Start: 2024-06-04 | End: 2024-06-04

## 2024-06-04 ASSESSMENT — ENCOUNTER SYMPTOMS
RESPIRATORY NEGATIVE: 1
GASTROINTESTINAL NEGATIVE: 1

## 2024-06-04 NOTE — PROGRESS NOTES
rizatriptan (MAXALT) 10 MG tablet 1 at onset May repeat in 2 hours x2 within 24 hrs if needed for HA 9 tablet 3    esomeprazole (NEXIUM) 20 MG delayed release capsule Take 1 capsule by mouth daily 90 capsule 3    furosemide (LASIX) 20 MG tablet TAKE ONE TABLET BY MOUTH ONE TIME A DAY 90 tablet 1    buPROPion (WELLBUTRIN XL) 150 MG extended release tablet Take 2 tablets by mouth every morning 180 tablet 1    albuterol sulfate HFA (PROVENTIL HFA) 108 (90 Base) MCG/ACT inhaler Inhale 1 puff into the lungs every 4 hours as needed for Wheezing or Shortness of Breath 1 Inhaler 5    diphenhydrAMINE (BENADRYL) 25 MG capsule Take 1 capsule by mouth every 6 hours as needed for Itching       No current facility-administered medications for this visit.           Review of Systems   Constitutional: Negative.    Respiratory: Negative.     Cardiovascular: Negative.    Gastrointestinal: Negative.    Genitourinary: Negative.    Musculoskeletal: Negative.    Skin: Negative.    Neurological:  Positive for headaches.   Psychiatric/Behavioral: Negative.           OBJECTIVE:  Physical Exam  Vitals reviewed.   Constitutional:       General: She is not in acute distress.     Appearance: She is well-developed. She is not diaphoretic.   HENT:      Head: Normocephalic and atraumatic.   Eyes:      General: No scleral icterus.     Conjunctiva/sclera: Conjunctivae normal.   Neck:      Vascular: No JVD.   Cardiovascular:      Rate and Rhythm: Normal rate and regular rhythm.   Pulmonary:      Effort: Pulmonary effort is normal. No respiratory distress.      Breath sounds: Normal breath sounds. No wheezing.   Abdominal:      General: There is no distension.      Palpations: Abdomen is soft.      Tenderness: There is no abdominal tenderness. There is no guarding or rebound.   Musculoskeletal:         General: Normal range of motion.      Cervical back: Neck supple.   Skin:     General: Skin is warm and dry.   Neurological:      Mental Status: She

## 2024-06-12 ENCOUNTER — TELEPHONE (OUTPATIENT)
Dept: INTERNAL MEDICINE CLINIC | Age: 44
End: 2024-06-12

## 2024-06-12 DIAGNOSIS — B37.9 YEAST INFECTION: Primary | ICD-10-CM

## 2024-06-12 RX ORDER — FLUCONAZOLE 150 MG/1
150 TABLET ORAL
Qty: 2 TABLET | Refills: 0 | Status: SHIPPED | OUTPATIENT
Start: 2024-06-12 | End: 2024-06-18

## 2024-06-12 NOTE — TELEPHONE ENCOUNTER
Pt experiencing yeast infection symptoms from recent antibiotic treatment, tried and failed OTC meds. She would like Diflucan sent to pharmacy...

## 2024-06-27 ENCOUNTER — CARE COORDINATION (OUTPATIENT)
Dept: OTHER | Facility: CLINIC | Age: 44
End: 2024-06-27

## 2024-06-27 NOTE — CARE COORDINATION
Ambulatory Care Coordination Note     6/27/2024 9:51 AM     Patient outreached by this ACM today to perform care management follow up . Patient at work and unable to talk.  Patient to outreach this ACM at patients convenience     This ACM will close program and sign off if return call is not received.

## 2024-07-05 ENCOUNTER — CARE COORDINATION (OUTPATIENT)
Dept: OTHER | Facility: CLINIC | Age: 44
End: 2024-07-05

## 2024-07-05 NOTE — CARE COORDINATION
Ambulatory Care Coordination Note     7/5/2024 11:17 AM        Return call not received.  Patient closed (unable to reach patient) from the High Risk Care Management program on 7/5/2024.  No further Ambulatory Care Manager follow up scheduled.

## 2024-07-11 ENCOUNTER — ENROLLMENT (OUTPATIENT)
Dept: PHARMACY | Age: 44
End: 2024-07-11

## 2024-07-11 DIAGNOSIS — G43.011 INTRACTABLE MIGRAINE WITHOUT AURA AND WITH STATUS MIGRAINOSUS: Primary | ICD-10-CM

## 2024-07-17 ENCOUNTER — TELEPHONE (OUTPATIENT)
Dept: INTERNAL MEDICINE CLINIC | Age: 44
End: 2024-07-17

## 2024-07-17 NOTE — TELEPHONE ENCOUNTER
The medication is APPROVED THRU 01/13/2025    If this requires a response please respond to the pool ( P [facility] MEDICATION PRE-AUTH).      Thank you please advise patient.

## 2024-07-17 NOTE — TELEPHONE ENCOUNTER
Submitted PA for Erenumab-aooe 70MG/ML   Via CM  (Key: [id number]) STATUS: PENDING.    Follow up done daily; if no decision with in three days we will refax.  If another three days goes by with no decision will call the insurance for status.

## 2024-07-18 ENCOUNTER — TELEPHONE (OUTPATIENT)
Dept: PHARMACY | Age: 44
End: 2024-07-18

## 2024-07-18 NOTE — TELEPHONE ENCOUNTER
Specialty Medication Service    Date: 7/18/2024  Patient's Name: Brooke Gomez YOB: 1980            _____________________________________________________________________________________________    Spoke with patient to schedule PharmD initial appointment for Specialty Medication Services. Patient scheduled 07/23/24 at 4 pm.  Most recent office notes from Micah in patient chart.    Jannie Rivera CPhT  Pharmacy   Specialty Medication Services   Phone: [phone] option 4    For Pharmacy Admin Tracking Only    Program: [facility]  CPA in place:  No  Recommendation Provided To: Patient/Caregiver: 1 via Telephone  Intervention Detail: Scheduled Appointment  Intervention Accepted By: Patient/Caregiver: 1  Gap Closed?:    Time Spent (min): 15

## 2024-07-23 ENCOUNTER — PHARMACY VISIT (OUTPATIENT)
Dept: PHARMACY | Age: 44
End: 2024-07-23

## 2024-07-23 DIAGNOSIS — G43.011 INTRACTABLE MIGRAINE WITHOUT AURA AND WITH STATUS MIGRAINOSUS: Primary | ICD-10-CM

## 2024-07-23 NOTE — PROGRESS NOTES
[facility] MD visit.   Referral - Specialist to [facility] referral received on 7/11/2024 and is in referral tab.   [facility] medical director initial visit - Patient due for initial [facility] medical director visit. Visit scheduled with Dr. Martines for 8/13/2024 at 4:00 PM.   Follow-up visit - Patient is due for six month pharmD follow up on 1/23/2025. Patient requests a phone call for scheduling closer to visit date, will outreach as appropriate.   Patient has completed [facility] consent form and Telehealth consent form. No questions in regard to consent form. Patient completed via [employer].    Patient has been updated in [facility] therapy management program.     PLAN  Goals of therapy, common side effects, medication storage, and administration reviewed with patient.  Initiate Aimovig 70mg every 30 days as prescribed  Recommended lab monitoring: none at this time  Recommended vaccinations: Prevnar (asthma)  Non pharmacotherapy recommendations: Get enough sleep; Reduce stress; Drink plenty of water; Avoid triggers; Regular physical exercise  Keep all scheduled appointments. Return to clinic in 6 months or call with any questions or concerns.      Keily Beard PharmD  Ambulatory Clinical Pharmacist   Specialty Medication Services   Phone: [phone] option 4  7/23/2024 5:23 PM    For Pharmacy Admin Tracking Only    Program: [facility]  CPA in place:  No  Recommendation Provided To: Provider: 1 via Note to Provider, Patient/Caregiver: 2 via Virtual Visit, and Pharmacy: 1  Intervention Detail: Benefit Assistance, Refill(s) Provided, Scheduled Appointment, and Vaccine Recommended/Administered  Intervention Accepted By: Provider: 1, Patient/Caregiver: 2, and Pharmacy: 1   Time Spent (min):  90    Brooke Gomez was evaluated through a synchronous (real-time) audio encounter. Patient identification was verified at the start of the visit. She (or guardian if applicable) is aware that this is a billable service, which includes applicable co-pays. This

## 2024-08-01 DIAGNOSIS — E88.810 METABOLIC SYNDROME: ICD-10-CM

## 2024-08-01 RX ORDER — METFORMIN HYDROCHLORIDE 500 MG/1
1000 TABLET, EXTENDED RELEASE ORAL
Qty: 180 TABLET | Refills: 3 | Status: SHIPPED | OUTPATIENT
Start: 2024-08-01

## 2024-08-02 NOTE — PROGRESS NOTES
Patient __X_ reached   _____not reached-preop instructions left on voice mail_____________      DATE__8/12/24______ TIME__1110______ARRIVAL__0940  FEC_______      Nothing to eat or drink after midnight the night before,except for what the prep instructions call for.If you do not have the instructions or do not understand them please contact your doctors office.    Follow any instructions your doctors office has given you including what medications to take the AM of your procedure and which ones to hold.You may use your inhalers - bring rescue inhalers with you DOS.If you take a long acting insulin the charito prior please cut the dose in half and take no diabetic medications that AM.If you take a weekly injection for diabetes or weight loss do not take for one week prior.If you have already taken your injection this week contact your surgeon. Follow specific doctors office instructions regarding blood thinners and if they want you to hold and for how long. If you are on a blood thinner and have no instructions please contact the office and ask.    Dress comfortably,bring your insurance card,picture ID,and a complete list of medications, including supplements.    You must have a responsible adult to stay with you during the procedure,drive you home and stay with you.    [facility] phone number [phone] for any questions.      OTHER INSTRUCTIONS(if applicable)_______none__________________________________________________        VISITOR POLICY(subject to change)    Current visitor policy is 2 visitors per patient.No children allowed. Mask at discretion of facility.  Visiting hours are 8a-8p. Overnight visitors will be at the discretion of the nurse. All policies are subject to change.

## 2024-08-12 ENCOUNTER — ANESTHESIA EVENT (OUTPATIENT)
Dept: ENDOSCOPY | Age: 44
End: 2024-08-12
Payer: COMMERCIAL

## 2024-08-12 ENCOUNTER — ANESTHESIA (OUTPATIENT)
Dept: ENDOSCOPY | Age: 44
End: 2024-08-12
Payer: COMMERCIAL

## 2024-08-12 ENCOUNTER — HOSPITAL ENCOUNTER (OUTPATIENT)
Age: 44
Setting detail: OUTPATIENT SURGERY
Discharge: HOME | End: 2024-08-12
Attending: INTERNAL MEDICINE | Admitting: INTERNAL MEDICINE
Payer: COMMERCIAL

## 2024-08-12 VITALS
WEIGHT: 140 LBS | DIASTOLIC BLOOD PRESSURE: 78 MMHG | OXYGEN SATURATION: 100 % | HEART RATE: 81 BPM | TEMPERATURE: 97.7 F | HEIGHT: 62 IN | SYSTOLIC BLOOD PRESSURE: 110 MMHG | BODY MASS INDEX: 25.76 KG/M2 | RESPIRATION RATE: 20 BRPM

## 2024-08-12 DIAGNOSIS — Z86.010 HISTORY OF COLON POLYPS: ICD-10-CM

## 2024-08-12 PROCEDURE — 88305 TISSUE EXAM BY PATHOLOGIST: CPT

## 2024-08-12 PROCEDURE — 6360000002 HC RX W HCPCS: Performed by: NURSE ANESTHETIST, CERTIFIED REGISTERED

## 2024-08-12 PROCEDURE — 3700000000 HC ANESTHESIA ATTENDED CARE: Performed by: INTERNAL MEDICINE

## 2024-08-12 PROCEDURE — 7100000010 HC PHASE II RECOVERY - FIRST 15 MIN: Performed by: INTERNAL MEDICINE

## 2024-08-12 PROCEDURE — 3700000001 HC ADD 15 MINUTES (ANESTHESIA): Performed by: INTERNAL MEDICINE

## 2024-08-12 PROCEDURE — 2580000003 HC RX 258: Performed by: NURSE ANESTHETIST, CERTIFIED REGISTERED

## 2024-08-12 PROCEDURE — 2500000003 HC RX 250 WO HCPCS: Performed by: NURSE ANESTHETIST, CERTIFIED REGISTERED

## 2024-08-12 PROCEDURE — 3609010600 HC COLONOSCOPY POLYPECTOMY SNARE/COLD BIOPSY: Performed by: INTERNAL MEDICINE

## 2024-08-12 PROCEDURE — 2580000003 HC RX 258: Performed by: INTERNAL MEDICINE

## 2024-08-12 PROCEDURE — 2709999900 HC NON-CHARGEABLE SUPPLY: Performed by: INTERNAL MEDICINE

## 2024-08-12 PROCEDURE — 7100000011 HC PHASE II RECOVERY - ADDTL 15 MIN: Performed by: INTERNAL MEDICINE

## 2024-08-12 RX ORDER — SODIUM CHLORIDE 9 MG/ML
INJECTION, SOLUTION INTRAVENOUS CONTINUOUS PRN
Status: DISCONTINUED | OUTPATIENT
Start: 2024-08-12 | End: 2024-08-12 | Stop reason: SDUPTHER

## 2024-08-12 RX ORDER — SODIUM CHLORIDE 9 MG/ML
INJECTION, SOLUTION INTRAVENOUS CONTINUOUS
Status: DISCONTINUED | OUTPATIENT
Start: 2024-08-12 | End: 2024-08-12 | Stop reason: HOSPADM

## 2024-08-12 RX ORDER — LIDOCAINE HYDROCHLORIDE 20 MG/ML
INJECTION, SOLUTION EPIDURAL; INFILTRATION; INTRACAUDAL; PERINEURAL PRN
Status: DISCONTINUED | OUTPATIENT
Start: 2024-08-12 | End: 2024-08-12 | Stop reason: SDUPTHER

## 2024-08-12 RX ORDER — PROPOFOL 10 MG/ML
INJECTION, EMULSION INTRAVENOUS CONTINUOUS PRN
Status: DISCONTINUED | OUTPATIENT
Start: 2024-08-12 | End: 2024-08-12 | Stop reason: SDUPTHER

## 2024-08-12 RX ADMIN — SODIUM CHLORIDE: 9 INJECTION, SOLUTION INTRAVENOUS at 10:25

## 2024-08-12 RX ADMIN — PROPOFOL 70 MG: 10 INJECTION, EMULSION INTRAVENOUS at 10:47

## 2024-08-12 RX ADMIN — LIDOCAINE HYDROCHLORIDE 100 MG: 20 INJECTION, SOLUTION EPIDURAL; INFILTRATION; INTRACAUDAL; PERINEURAL at 10:47

## 2024-08-12 RX ADMIN — PROPOFOL 140 MCG/KG/MIN: 10 INJECTION, EMULSION INTRAVENOUS at 10:46

## 2024-08-12 RX ADMIN — PROPOFOL 30 MG: 10 INJECTION, EMULSION INTRAVENOUS at 10:49

## 2024-08-12 RX ADMIN — SODIUM CHLORIDE: 9 INJECTION, SOLUTION INTRAVENOUS at 10:01

## 2024-08-12 ASSESSMENT — ENCOUNTER SYMPTOMS: SHORTNESS OF BREATH: 0

## 2024-08-12 ASSESSMENT — PAIN - FUNCTIONAL ASSESSMENT
PAIN_FUNCTIONAL_ASSESSMENT: 0-10
PAIN_FUNCTIONAL_ASSESSMENT: NONE - DENIES PAIN

## 2024-08-12 NOTE — ANESTHESIA PRE PROCEDURE
Department of Anesthesiology  Preprocedure Note       Name:  Brooke Gomez   Age:  44 y.o.  :  1980                                          MRN:  [id number]         Date:  2024      Surgeon: Surgeon(s):  Elia Daly MD    Procedure: Procedure(s):  COLONOSCOPY DIAGNOSTIC    Medications prior to admission:   Prior to Admission medications    Medication Sig Start Date End Date Taking? Authorizing Provider   Erenumab-aooe 70 MG/ML SOAJ Inject 70 mg into the skin every 30 days 24  Yes Albino Obrien APRN - CNP   ALPRAZolam (XANAX) 0.5 MG tablet Take 1 tablet by mouth nightly as needed for Sleep (PRN).   Yes Provider, MD Ge   buPROPion (WELLBUTRIN XL) 150 MG extended release tablet Take 2 tablets by mouth every morning 24  Yes Albino Obrien APRN - CNP   furosemide (LASIX) 20 MG tablet Take 1 tablet by mouth daily 24  Yes Albino Obrien APRN - CNP   valACYclovir (VALTREX) 500 MG tablet Take 1 tablet by mouth daily 24  Yes Albino Obrien APRN - CNP   rizatriptan (MAXALT) 10 MG tablet 1 at onset May repeat in 2 hours x2 within 24 hrs if needed for HA 24  Yes Dileep Kelly MD   esomeprazole (NEXIUM) 20 MG delayed release capsule Take 1 capsule by mouth daily 24  Yes Dileep Kelly MD   albuterol sulfate HFA (PROVENTIL HFA) 108 (90 Base) MCG/ACT inhaler Inhale 1 puff into the lungs every 4 hours as needed for Wheezing or Shortness of Breath 19  Yes Dileep Kelly MD   metFORMIN (GLUCOPHAGE-XR) 500 MG extended release tablet Take 2 tablets by mouth daily (with breakfast)  Patient not taking: Reported on 2024   Albino Obrien APRN - CNP       Current medications:    No current facility-administered medications for this encounter.       Allergies:  No Known Allergies    Problem List:    Patient Active Problem List   Diagnosis Code    Atopy Z88.9    Anxiety F41.9    Asthma J45.909    Irregular heartbeat I49.9    Tachycardia R00.0

## 2024-08-12 NOTE — H&P
Gastroenterology Note                 Pre-operative History and Physical    Patient: Brooke Gomez  : 1980  CSN:     History Obtained From:   Patient or guardian.      HISTORY OF PRESENT ILLNESS:    The patient is a 44 y.o. female here for Endoscopy.      Past Medical History:    Past Medical History:   Diagnosis Date    Anxiety     Asthma     Depression     Ectopic pregnancy     Endometriosis     Headache     Kidney stones     Menorrhagia with irregular cycle      Past Surgical History:    Past Surgical History:   Procedure Laterality Date    APPENDECTOMY      ECTOPIC PREGNANCY SURGERY      ENDOMETRIAL ABLATION      HYSTERECTOMY (CERVIX STATUS UNKNOWN)      HYSTERECTOMY, VAGINAL Bilateral 2023    ROBOTIC LAPAROSCOPIC ASSISTED VAGINAL HYSTERECTOMY WITH BILATERAL SALPINGECTOMY performed by Bravo Gray MD at [facility] OR    OTHER SURGICAL HISTORY      laser for endometriosis    URETER STENT PLACEMENT      VAGINA SURGERY N/A 2023    VAGINAL EXAMINATION UNDER ANESTHESIA, REPAIR OF VAGINAL CUFF performed by Bravo Gray MD at [facility] OR    WISDOM TOOTH EXTRACTION       Medications Prior to Admission:   No current facility-administered medications on file prior to encounter.     Current Outpatient Medications on File Prior to Encounter   Medication Sig Dispense Refill    Erenumab-aooe 70 MG/ML SOAJ Inject 70 mg into the skin every 30 days 3 mL 3    ALPRAZolam (XANAX) 0.5 MG tablet Take 1 tablet by mouth nightly as needed for Sleep (PRN).      buPROPion (WELLBUTRIN XL) 150 MG extended release tablet Take 2 tablets by mouth every morning 180 tablet 3    furosemide (LASIX) 20 MG tablet Take 1 tablet by mouth daily 90 tablet 3    valACYclovir (VALTREX) 500 MG tablet Take 1 tablet by mouth daily 90 tablet 3    rizatriptan (MAXALT) 10 MG tablet 1 at onset May repeat in 2 hours x2 within 24 hrs if needed for HA 9 tablet 3    esomeprazole (NEXIUM) 20 MG delayed release capsule Take 1

## 2024-08-12 NOTE — DISCHARGE INSTRUCTIONS
variety of vegetables, fruits, legumes (such as peas and beans), fish, poultry, and whole grains.  Do not smoke. If you need help quitting, talk to your doctor about stop-smoking programs and medicines. These can increase your chances of quitting for good.  If you drink alcohol, limit how much you drink. Limit alcohol to 2 drinks a day for men and 1 drink a day for women.  When should you call for help?   Call your doctor now or seek immediate medical care if:    You have severe belly pain.     Your stools are maroon or very bloody.   Watch closely for changes in your health, and be sure to contact your doctor if:    You have a fever.     You have nausea or vomiting.     You have a change in bowel habits (new constipation or diarrhea).     Your symptoms get worse or are not improving as expected.   Where can you learn more?  Go to https://www.[employer].net/patientEd and enter C571 to learn more about \"Colon Polyps: Care Instructions.\"  Current as of: October 19, 2023  Content Version: 14.1  © 2006-2024 [employer].   Care instructions adapted under license by [employer]. If you have questions about a medical condition or this instruction, always ask your healthcare professional. [employer] disclaims any warranty or liability for your use of this information.

## 2024-08-12 NOTE — ANESTHESIA POSTPROCEDURE EVALUATION
Department of Anesthesiology  Postprocedure Note    Patient: Brooke Gomez  MRN: [id number]  YOB: 1980  Date of evaluation: 8/12/2024    Procedure Summary       Date: 08/12/24 Room / Location: [place] [address] / [facility]    Anesthesia Start: 1043 Anesthesia Stop: 1106    Procedure: COLONOSCOPY POLYPECTOMY SNARE/BIOPSY Diagnosis:       History of colon polyps      (History of colon polyps [Z86.010])    Surgeons: Elia Daly MD Responsible Provider: Jah Aquino MD    Anesthesia Type: MAC ASA Status: 3            Anesthesia Type: MAC    Vanessa Phase I:      Vanessa Phase II:      Anesthesia Post Evaluation    Patient location during evaluation: PACU  Level of consciousness: awake  Airway patency: patent  Cardiovascular status: hemodynamically stable  Respiratory status: acceptable  There was medical reason for not using a multimodal analgesia pain management approach.    No notable events documented.

## 2024-08-13 ENCOUNTER — PHARMACY VISIT (OUTPATIENT)
Dept: PHARMACY | Age: 44
End: 2024-08-13

## 2024-08-13 DIAGNOSIS — G43.011 INTRACTABLE MIGRAINE WITHOUT AURA AND WITH STATUS MIGRAINOSUS: ICD-10-CM

## 2024-08-14 NOTE — PROGRESS NOTES
I called the patient to inform her that I was the medical director for the [facility] subspecialty pharmacy program.    The patient has a history of Migraines. The patient is under the care of PCP.    She will start Aimovig. This was prescribed by her PCP  The patient is getting lab work regularly. I have reviewed pharmacy notes in detail. I told the patient I work in close collaboration with pharmacist    The patient has had no issues getting the medication from the pharmacy.    I told the patient that primary management will be by the patient's specialist. The patient verbalized understanding.    ANIYA TOMLIN MD 8/13/2024

## 2024-09-03 ENCOUNTER — TELEPHONE (OUTPATIENT)
Dept: INTERNAL MEDICINE CLINIC | Age: 44
End: 2024-09-03

## 2024-09-03 DIAGNOSIS — R92.8 ABNORMAL MAMMOGRAM: Primary | ICD-10-CM

## 2024-09-03 NOTE — TELEPHONE ENCOUNTER
Paige from  Mammography dept calling in asking for orders for L breast US and em diagnostic mammo with dx of abnormal mammogram - current orders in epic .     Pt overdue for 6 mo f/u and will be due for em diag mammo - that is why she is asking for both.    No call back needed to Paige as long as orders in epic.

## 2024-09-09 ENCOUNTER — TELEPHONE (OUTPATIENT)
Dept: INTERNAL MEDICINE CLINIC | Age: 44
End: 2024-09-09

## 2024-09-09 DIAGNOSIS — U07.1 COVID-19: Primary | ICD-10-CM

## 2024-09-12 ENCOUNTER — OFFICE VISIT (OUTPATIENT)
Dept: INTERNAL MEDICINE CLINIC | Age: 44
End: 2024-09-12
Payer: COMMERCIAL

## 2024-09-12 VITALS
HEIGHT: 62 IN | HEART RATE: 91 BPM | BODY MASS INDEX: 25.76 KG/M2 | OXYGEN SATURATION: 98 % | WEIGHT: 140 LBS | DIASTOLIC BLOOD PRESSURE: 84 MMHG | SYSTOLIC BLOOD PRESSURE: 142 MMHG

## 2024-09-12 DIAGNOSIS — M77.11 RIGHT TENNIS ELBOW: Primary | ICD-10-CM

## 2024-09-12 PROCEDURE — 99213 OFFICE O/P EST LOW 20 MIN: CPT | Performed by: NURSE PRACTITIONER

## 2024-09-12 RX ORDER — METHYLPREDNISOLONE 4 MG
TABLET, DOSE PACK ORAL
Qty: 1 KIT | Refills: 0 | Status: SHIPPED | OUTPATIENT
Start: 2024-09-12 | End: 2024-09-18

## 2024-09-16 ENCOUNTER — TELEPHONE (OUTPATIENT)
Dept: INTERNAL MEDICINE CLINIC | Age: 44
End: 2024-09-16

## 2024-09-16 ENCOUNTER — HOSPITAL ENCOUNTER (OUTPATIENT)
Dept: MAMMOGRAPHY | Age: 44
Discharge: HOME | End: 2024-09-20
Payer: COMMERCIAL

## 2024-09-16 VITALS — BODY MASS INDEX: 25.76 KG/M2 | HEIGHT: 62 IN | WEIGHT: 140 LBS

## 2024-09-16 DIAGNOSIS — Z12.31 VISIT FOR SCREENING MAMMOGRAM: ICD-10-CM

## 2024-09-16 DIAGNOSIS — M77.8 ELBOW TENDINITIS: Primary | ICD-10-CM

## 2024-09-16 PROCEDURE — 77063 BREAST TOMOSYNTHESIS BI: CPT

## 2024-09-16 RX ORDER — METHYLPREDNISOLONE ACETATE 40 MG/ML
40 INJECTION, SUSPENSION INTRA-ARTICULAR; INTRALESIONAL; INTRAMUSCULAR; SOFT TISSUE ONCE
Status: COMPLETED | OUTPATIENT
Start: 2024-09-16 | End: 2024-09-16

## 2024-09-19 DIAGNOSIS — R92.8 ABNORMALITY OF LEFT BREAST ON SCREENING MAMMOGRAM: Primary | ICD-10-CM

## 2024-09-20 ENCOUNTER — OFFICE VISIT (OUTPATIENT)
Dept: ORTHOPEDIC SURGERY | Age: 44
End: 2024-09-20

## 2024-09-20 VITALS — BODY MASS INDEX: 25.76 KG/M2 | WEIGHT: 140 LBS | HEIGHT: 62 IN | RESPIRATION RATE: 16 BRPM

## 2024-09-20 DIAGNOSIS — M77.11 LATERAL EPICONDYLITIS OF RIGHT ELBOW: Primary | ICD-10-CM

## 2024-09-20 DIAGNOSIS — M25.522 PAIN OF BOTH ELBOWS: ICD-10-CM

## 2024-09-20 DIAGNOSIS — M25.521 PAIN OF BOTH ELBOWS: ICD-10-CM

## 2024-09-20 RX ORDER — MELOXICAM 15 MG/1
15 TABLET ORAL DAILY
Qty: 30 TABLET | Refills: 1 | Status: SHIPPED | OUTPATIENT
Start: 2024-09-20 | End: 2024-11-19

## 2024-09-20 SDOH — HEALTH STABILITY: PHYSICAL HEALTH: ON AVERAGE, HOW MANY MINUTES DO YOU ENGAGE IN EXERCISE AT THIS LEVEL?: 120 MIN

## 2024-09-20 SDOH — HEALTH STABILITY: PHYSICAL HEALTH: ON AVERAGE, HOW MANY DAYS PER WEEK DO YOU ENGAGE IN MODERATE TO STRENUOUS EXERCISE (LIKE A BRISK WALK)?: 5 DAYS

## 2024-09-25 ENCOUNTER — HOSPITAL ENCOUNTER (OUTPATIENT)
Dept: OCCUPATIONAL THERAPY | Age: 44
Setting detail: THERAPIES SERIES
Discharge: HOME | End: 2024-09-25
Payer: COMMERCIAL

## 2024-09-25 PROCEDURE — 97110 THERAPEUTIC EXERCISES: CPT

## 2024-09-25 PROCEDURE — 97165 OT EVAL LOW COMPLEX 30 MIN: CPT

## 2024-09-30 ENCOUNTER — APPOINTMENT (OUTPATIENT)
Dept: OCCUPATIONAL THERAPY | Age: 44
End: 2024-09-30
Payer: COMMERCIAL

## 2024-09-30 DIAGNOSIS — G56.01 RIGHT CARPAL TUNNEL SYNDROME: Primary | ICD-10-CM

## 2024-10-01 ENCOUNTER — HOSPITAL ENCOUNTER (OUTPATIENT)
Dept: WOMENS IMAGING | Age: 44
Discharge: HOME | End: 2024-10-01
Payer: COMMERCIAL

## 2024-10-01 DIAGNOSIS — R92.8 ABNORMALITY OF LEFT BREAST ON SCREENING MAMMOGRAM: ICD-10-CM

## 2024-10-01 PROCEDURE — G0279 TOMOSYNTHESIS, MAMMO: HCPCS

## 2024-10-03 ENCOUNTER — HOSPITAL ENCOUNTER (OUTPATIENT)
Dept: OCCUPATIONAL THERAPY | Age: 44
Setting detail: THERAPIES SERIES
Discharge: HOME | End: 2024-10-03
Payer: COMMERCIAL

## 2024-10-03 PROCEDURE — 97035 APP MDLTY 1+ULTRASOUND EA 15: CPT | Performed by: OCCUPATIONAL THERAPIST

## 2024-10-03 PROCEDURE — 97140 MANUAL THERAPY 1/> REGIONS: CPT | Performed by: OCCUPATIONAL THERAPIST

## 2024-10-03 PROCEDURE — 97112 NEUROMUSCULAR REEDUCATION: CPT | Performed by: OCCUPATIONAL THERAPIST

## 2024-10-03 NOTE — FLOWSHEET NOTE
or improve muscular strength or increase flexibility, following either an injury or surgery.      GOALS     Patient stated goal: pain free with all IADL , vocational and avocational pursuits with increased strength     [] Progressing: [] Met: [] Not Met: [] Adjusted    Therapist goals for Patient:   Short Term Goals: To be achieved in 2 weeks  1. Independent in HEP and progression per patient tolerance, in order to prevent re-injury.     [] Progressing: [] Met: [] Not Met: [] Adjusted  2. Patient will have a decrease in pain to facilitate improvement in movement, function, and ADLs as indicated by Functional Deficits.    [] Progressing: [] Met: [] Not Met: [] Adjusted    Long Term Goals: To be achieved in 8 weeks (through 11/25/2024), including patient directed goals to address patient identified performance deficits:  1. Pt to be independent in graded HEP progression with a good level of effort and compliance.  [] Progressing: [] Met: [] Not Met: [] Adjusted  2. Pt to report a score of </= 10 % on the Quick DASH disability questionnaire for increased performance with carrying, moving, and handling objects.  [] Progressing: [] Met: [] Not Met: [] Adjusted  3. Pt will demonstrate increased pinch strength to 10 for improved independence with in hand translation and typing.  [] Progressing: [] Met: [] Not Met: [] Adjusted  4. Pt will demonstrate increased strength to 50 pounds of   for improved independence with lifting and carrying 25 pound loads camping and completing BP without difficulty .  [] Progressing: [] Met: [] Not Met: [] Adjusted  5. Pt will have a decrease in pain to 2/10 to facilitate increased participation in ADL and IADL.  [] Progressing: [] Met: [] Not Met: [] Adjusted    TREATMENT PLAN     Frequency/Duration: 2x/week for 6 weeks for the following treatment interventions:    Interventions:  [x] Therapeutic exercise including: strength training, ROM, including postural re-education.   [x] NMR

## 2024-10-08 ENCOUNTER — HOSPITAL ENCOUNTER (OUTPATIENT)
Dept: OCCUPATIONAL THERAPY | Age: 44
Setting detail: THERAPIES SERIES
Discharge: HOME | End: 2024-10-08
Payer: COMMERCIAL

## 2024-10-08 PROCEDURE — 97110 THERAPEUTIC EXERCISES: CPT

## 2024-10-08 PROCEDURE — 97140 MANUAL THERAPY 1/> REGIONS: CPT

## 2024-10-08 NOTE — FLOWSHEET NOTE
[facility] - Outpatient Rehabilitation and Therapy [address], [place] [address] office: [phone] fax: [phone]       Occupational Therapy: TREATMENT/PROGRESS NOTE   Patient: Brooke Gomez (44 y.o. female)   Examination Date: 10/08/2024   :  1980 MRN: [id number]   Visit #: 3/12  Insurance Allowable Auth Needed   [place] []Yes    [x]No    Insurance: Payor: [facility] / Plan: [employer] EMPLO[employer]ES / Product Type: *No Product type* /   Insurance ID: [id number] - (Commercial)  Secondary Insurance (if applicable):    Treatment Diagnosis: right elbow pain      Medical Diagnosis:  Lateral epicondylitis of right elbow [M77.11]   Referring Physician: Sadia Bocanegra PA  PCP: Albino Obrien, APRN - CNP     DOI:Brooke Gomez is a 44 y.o. right handed female self-referred for evaluation and treatment of right elbow pain. This is evaluated as a personal injury. Pain began 3 weeks ago. Pain is rated as a 8/10.   There was not a history of injury. 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 She has pain at the lateral epicondyle with lifting, gripping, wrist extension, pronation, supination, typing, taking blood pressures, and dialing on the phone. She has been wearing an elbow brace with ice which helps.    The patient has not had PT. The patient has had an injection at the clinic she works in which made pain worse. Her PCP prescribed a steroid which she didn't tolerate well due to side effects. The patient has tried NSAIDs and biofreeze. 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      DOS: n/a    Plan of care signed (Y/N): Y    Date of Patient follow up with Physician: 24     Eval Date: 24  Progress Report/POC:  NO  POC update due: (10 visits /OR AUTH LIMITS, whichever is less)  10/25/24                                            Precautions/ Contra-indications:           Latex allergy:  NO  Pacemaker:    NO  Contraindications for Manipulation: None  Other:    Red

## 2024-10-10 ENCOUNTER — HOSPITAL ENCOUNTER (OUTPATIENT)
Dept: OCCUPATIONAL THERAPY | Age: 44
Setting detail: THERAPIES SERIES
Discharge: HOME | End: 2024-10-10
Payer: COMMERCIAL

## 2024-10-10 PROCEDURE — 97140 MANUAL THERAPY 1/> REGIONS: CPT

## 2024-10-10 PROCEDURE — 97035 APP MDLTY 1+ULTRASOUND EA 15: CPT

## 2024-10-10 PROCEDURE — 97014 ELECTRIC STIMULATION THERAPY: CPT

## 2024-10-10 NOTE — FLOWSHEET NOTE
25 pound loads camping and completing BP without difficulty .  [] Progressing: [] Met: [] Not Met: [] Adjusted  5. Pt will have a decrease in pain to 2/10 to facilitate increased participation in ADL and IADL.  [] Progressing: [] Met: [] Not Met: [] Adjusted    TREATMENT PLAN     Frequency/Duration: 2x/week for 6 weeks for the following treatment interventions:    Interventions:  [x] Therapeutic exercise including: strength training, ROM, including postural re-education.   [x] NMR activation and proprioception, including postural re-education.    [x] Manual therapy as indicated to include: PROM, Gr I-IV mobilizations, and STM  [x] Modalities as needed that may include: Thermal Agents, Ultrasound, and dry needling  [x] Patient education on joint protection, postural re-education, activity modification, progression of HEP.        [] Aquatic Therapy    Plan:  Cont POC    Electronically Signed by: CALDERON Conn/L, C/SHAUNA ([id number])    Date: 10/10/2024    Note: Portions of this note have been templated and/or copied from initial evaluation, reassessments and prior notes for documentation efficiency.

## 2024-10-11 ENCOUNTER — TELEPHONE (OUTPATIENT)
Dept: INTERNAL MEDICINE CLINIC | Age: 44
End: 2024-10-11

## 2024-10-11 DIAGNOSIS — M25.521 RIGHT ELBOW PAIN: ICD-10-CM

## 2024-10-11 DIAGNOSIS — R29.898 RIGHT HAND WEAKNESS: ICD-10-CM

## 2024-10-11 DIAGNOSIS — M77.11 LATERAL EPICONDYLITIS OF RIGHT ELBOW: Primary | ICD-10-CM

## 2024-10-11 RX ORDER — OXYCODONE AND ACETAMINOPHEN 5; 325 MG/1; MG/1
1 TABLET ORAL EVERY 8 HOURS PRN
Qty: 15 TABLET | Refills: 0 | Status: SHIPPED | OUTPATIENT
Start: 2024-10-11 | End: 2024-10-16

## 2024-10-11 NOTE — TELEPHONE ENCOUNTER
Percocet sent to pharmacy.  Obviously, not a long-term solution.    Given lack of improvement with NSAID, ice, bracing, injection, and therapy, will order an MRI of right elbow

## 2024-10-11 NOTE — TELEPHONE ENCOUNTER
Pt unable to sleep for pain and night sweats for the past 2 nights, she tried old hydrocodone, muscle relaxer and advil pm with  no relief. Requesting meds to help.

## 2024-10-15 ENCOUNTER — HOSPITAL ENCOUNTER (OUTPATIENT)
Dept: MRI IMAGING | Age: 44
Discharge: HOME | End: 2024-10-15
Payer: COMMERCIAL

## 2024-10-15 ENCOUNTER — HOSPITAL ENCOUNTER (OUTPATIENT)
Dept: OCCUPATIONAL THERAPY | Age: 44
Setting detail: THERAPIES SERIES
Discharge: HOME | End: 2024-10-15
Payer: COMMERCIAL

## 2024-10-15 DIAGNOSIS — M77.11 LATERAL EPICONDYLITIS OF RIGHT ELBOW: ICD-10-CM

## 2024-10-15 DIAGNOSIS — M25.521 RIGHT ELBOW PAIN: ICD-10-CM

## 2024-10-15 DIAGNOSIS — R29.898 RIGHT HAND WEAKNESS: ICD-10-CM

## 2024-10-15 PROCEDURE — 73221 MRI JOINT UPR EXTREM W/O DYE: CPT

## 2024-10-15 NOTE — FLOWSHEET NOTE
Light to moderate manual soft tissue mobilization.  Initiated at insertion of lateral deltoid working down to medial epicondyle into extensor wad distal to pronator insertion.  Patient verbalized positive tinels working over cubital tunnel, insertion of tricep and pronator .                                     NMR re-education (39076)                                           Therapeutic Activity (63990)                                            Modalities:         Education/Home Exercise Program:       Access Code: [email or web address]  URL: [email or web address]  Date: 09/25/2024  Prepared by: Dali Jang    Exercises  - Single Arm Overhead Triceps Extension  - 1 x daily - 7 x weekly - 3 sets - 10 reps  - Seated Wrist Extension Stretch  - 1 x daily - 7 x weekly - 3 sets - 10 reps  - Wrist Tendon Gliding  - 2 x daily - 14 x weekly - 1 sets - 10 reps - 5 hold    Patient Education  - Office Posture  - Tennis Elbow  - What Is Lateral Elbow Pain?    Median and ulnar nerve glides added     Patient given quick instruction of protection of lifting and OT re fitted pre luz marina wrist cock up to provide adequate support.  Patient also educated to use elbow padding at night to protect the elbow from pressure since she had cubital tunnel symptoms     10/3/24:  recommended Pt cont wearing wrist support at night and during day when possible to allow rest for radial nerve.   -  instructed Pt to discontinue use of elbow strap    10/8/24: cross friction massage; warm up prior to stretching and activity   ASSESSMENT     Today's Assessment:    Patient reports she felt like her arm had a nice progressive stretch on this date.  Recommend dry needling,  Patient is receptive.  Contacted Provider for permission on this date.   .     Medical Necessity Documentation:  I certify that this patient meets the below criteria necessary for medical necessity for care and/or justification of therapy services:  The patient has functional impairments

## 2024-10-17 ENCOUNTER — HOSPITAL ENCOUNTER (OUTPATIENT)
Dept: OCCUPATIONAL THERAPY | Age: 44
Setting detail: THERAPIES SERIES
Discharge: HOME | End: 2024-10-17
Payer: COMMERCIAL

## 2024-10-17 DIAGNOSIS — S56.511A PARTIAL TEAR OF COMMON EXTENSOR TENDON OF RIGHT ELBOW: Primary | ICD-10-CM

## 2024-10-17 PROCEDURE — 97035 APP MDLTY 1+ULTRASOUND EA 15: CPT

## 2024-10-17 PROCEDURE — 97110 THERAPEUTIC EXERCISES: CPT

## 2024-10-17 PROCEDURE — 97140 MANUAL THERAPY 1/> REGIONS: CPT

## 2024-10-17 NOTE — FLOWSHEET NOTE
[facility] - Outpatient Rehabilitation and Therapy [address], [place] [address] office: [phone] fax: [phone]       Occupational Therapy: TREATMENT/PROGRESS NOTE   Patient: Brooke Gomez (44 y.o. female)   Examination Date: 10/17/2024   :  1980 MRN: [id number]   Visit #:   Insurance Allowable Auth Needed   MN []Yes    [x]No    Insurance: Payor: [facility] / Plan: [employer] [facility] EMPLOYEES / Product Type: *No Product type* /   Insurance ID: [id number] - (Commercial)  Secondary Insurance (if applicable):    Treatment Diagnosis: right elbow pain      Medical Diagnosis:  Lateral epicondylitis of right elbow [M77.11]   Referring Physician: Sadia Bocanegra PA  PCP: Albino Obrien, APRN - CNP     DOI:Brooke Gomez is a 44 y.o. right handed female self-referred for evaluation and treatment of right elbow pain. This is evaluated as a personal injury. Pain began 3 weeks ago. Pain is rated as a 8/10.   There was not a history of injury. 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 She has been wearing an elbow brace with ice which helps.    The patient has not had PT. The patient has had an injection at the clinic she works in which made pain worse. Her PCP prescribed a steroid which she didn't tolerate well due to side effects. The patient has tried NSAIDs and biofreeze. 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      DOS: n/a    Plan of care signed (Y/N): Y    Date of Patient follow up with Physician: 24     Eval Date: 24  Progress Report/POC:  NO  POC update due: (10 visits /OR AUTH LIMITS, whichever is less)  10/25/24                                            Precautions/ Contra-indications:           Latex allergy:  NO  Pacemaker:    NO  Contraindications for Manipulation: None  Other:    Red

## 2024-10-22 ENCOUNTER — APPOINTMENT (OUTPATIENT)
Dept: OCCUPATIONAL THERAPY | Age: 44
End: 2024-10-22
Payer: COMMERCIAL

## 2024-10-22 ENCOUNTER — PROCEDURE VISIT (OUTPATIENT)
Dept: NEUROLOGY | Age: 44
End: 2024-10-22

## 2024-10-22 ENCOUNTER — OFFICE VISIT (OUTPATIENT)
Dept: ORTHOPEDIC SURGERY | Age: 44
End: 2024-10-22
Payer: COMMERCIAL

## 2024-10-22 VITALS — WEIGHT: 150 LBS | BODY MASS INDEX: 27.6 KG/M2 | HEIGHT: 62 IN

## 2024-10-22 DIAGNOSIS — M77.11 LATERAL EPICONDYLITIS OF RIGHT ELBOW: Primary | ICD-10-CM

## 2024-10-22 DIAGNOSIS — R20.0 NUMBNESS AND TINGLING OF RIGHT HAND: Primary | ICD-10-CM

## 2024-10-22 DIAGNOSIS — R20.2 NUMBNESS AND TINGLING OF RIGHT HAND: Primary | ICD-10-CM

## 2024-10-22 PROCEDURE — 99213 OFFICE O/P EST LOW 20 MIN: CPT | Performed by: ORTHOPAEDIC SURGERY

## 2024-10-22 NOTE — PROGRESS NOTES
VAGINAL EXAMINATION UNDER ANESTHESIA, REPAIR OF VAGINAL CUFF performed by Bravo Gray MD at [facility] OR    WISDOM TOOTH EXTRACTION         Family History   Problem Relation Age of Onset    High Blood Pressure Mother         DDD    Diabetes Mother     High Blood Pressure Father     Diabetes Father     Breast Cancer Maternal Grandmother         over 50    Diabetes Maternal Grandmother     Arthritis Maternal Grandmother         DDD    Cancer Maternal Grandmother     High Blood Pressure Maternal Grandmother     Colon Cancer Maternal Grandmother     Cancer Maternal Grandfather     Heart Disease Paternal Grandmother     Cancer Paternal Grandfather     Diabetes Paternal Grandfather     Heart Disease Paternal Grandfather     Stroke Paternal Grandfather        Social History     Socioeconomic History    Marital status:      Spouse name: David    Number of children: 2    Years of education: 13   Occupational History    Occupation: MA   Tobacco Use    Smoking status: Former     Current packs/day: 0.00     Types: Cigarettes     Quit date:      Years since quittin.8    Smokeless tobacco: Never    Tobacco comments:     Quit smoking : started again and quit again in    Vaping Use    Vaping status: Some Days   Substance and Sexual Activity    Alcohol use: Yes     Alcohol/week: 6.0 standard drinks of alcohol     Types: 6 Shots of liquor per week     Comment: socially    Drug use: Never    Sexual activity: Yes     Partners: Male     Social Determinants of Health     Financial Resource Strain: Low Risk  (2024)    Overall Financial Resource Strain (CARDIA)     Difficulty of Paying Living Expenses: Not hard at all   Food Insecurity: No Food Insecurity (2024)    Hunger Vital Sign     Worried About Running Out of Food in the Last Year: Never true     Ran Out of Food in the Last Year: Never true   Transportation Needs: Unknown (2024)    PRAPARE - Transportation     Lack of Transportation

## 2024-10-22 NOTE — PROGRESS NOTES
Dear Albino Obrien, APRN - CNP  [address] [id number]    I had the pleasure of seeing your patient Brooke Gomez  today for EMG and NCV. I have attached a detailed summary below:        Electromyography report        [facility]  [facility], [address]  [place] [id number]      Patient: Brooke Gomez    MR Number: [id number]  YOB: 1980  Date of Visit: 10/22/2024    Clinical history:  The patient is a 44 y.o. years old female with several month history of right hand numbness and tingling and right arm numbness.  Exam: Positive Tinel's sign but no sensory deficit or weakness.  2+ DTRs.    Findings:   For full details about such findings, please see attached report. Needle examination was recorded using monopolar needle in selected muscles. Unless otherwise noted, the temperature of the limb was monitored and maintained between 32-36°C during the performance of the NCV testing.     1.  Right median sensory distal latency and amplitude were within normal limits.  2.  Right ulnar sensory distal latency and amplitude were within normal limits  3.  Right median motor distal latency, amplitude and conduction velocities were within normal limits.  4.  Right ulnar motor distal latency, amplitudes and conduction velocities were within normal limits.  5.  Right radial sensory distal latency and amplitude were within normal limits.  6  Right median to ulnar palmar study showed asymmetric prolongation of distal latency at 0.4 ms.  7.  Needle examinations of the right upper extremity was performed in selected muscles. Needle examination of these selected muscle showed normal insertional activities, morphology, amplitude, and recruitment of motor unit potential.      Impression:    This test is mildly abnormal.  The electrophysiological pattern showed evidence of mild right median compression neuropathy at or distal to the wrist consistent with mild right carpal

## 2024-10-23 DIAGNOSIS — M67.829 TENDINOSIS OF ELBOW: ICD-10-CM

## 2024-10-23 DIAGNOSIS — M77.11 LATERAL EPICONDYLITIS OF RIGHT ELBOW: Primary | ICD-10-CM

## 2024-11-12 DIAGNOSIS — F33.1 MODERATE EPISODE OF RECURRENT MAJOR DEPRESSIVE DISORDER (HCC): ICD-10-CM

## 2024-11-12 RX ORDER — BUPROPION HYDROCHLORIDE 150 MG/1
450 TABLET ORAL EVERY MORNING
Qty: 90 TABLET | Refills: 1 | Status: SHIPPED | OUTPATIENT
Start: 2024-11-12

## 2024-11-12 NOTE — TELEPHONE ENCOUNTER
Requested Prescriptions     Pending Prescriptions Disp Refills    buPROPion (WELLBUTRIN XL) 150 MG extended release tablet 90 tablet 1     Sig: Take 3 tablets by mouth every morning        Last Filled:  06/04/24    Patient Phone Number: [phone] (home)     Last appt: 9/12/2024   Next appt: Visit date not found    Last [place]:       10/11/2024     8:18 AM   RX Monitoring   Periodic Controlled Substance Monitoring No signs of potential drug abuse or diversion identified.

## 2024-11-18 ENCOUNTER — TELEPHONE (OUTPATIENT)
Dept: INTERNAL MEDICINE CLINIC | Age: 44
End: 2024-11-18

## 2024-11-18 RX ORDER — FUROSEMIDE 20 MG/1
20 TABLET ORAL DAILY
Qty: 90 TABLET | Refills: 3 | Status: SHIPPED | OUTPATIENT
Start: 2024-11-18

## 2024-11-18 NOTE — TELEPHONE ENCOUNTER
Patient is requesting Lasix sent to [facility]. I have sent the script in for the patient.

## 2024-11-30 DIAGNOSIS — R05.1 ACUTE COUGH: ICD-10-CM

## 2024-11-30 DIAGNOSIS — R06.2 WHEEZING: ICD-10-CM

## 2024-12-02 RX ORDER — PREDNISONE 20 MG/1
TABLET ORAL
Qty: 18 TABLET | Refills: 0 | OUTPATIENT
Start: 2024-12-02

## 2024-12-04 DIAGNOSIS — F33.1 MODERATE EPISODE OF RECURRENT MAJOR DEPRESSIVE DISORDER (HCC): ICD-10-CM

## 2024-12-04 RX ORDER — BUPROPION HYDROCHLORIDE 150 MG/1
450 TABLET ORAL EVERY MORNING
Qty: 270 TABLET | Refills: 1 | Status: SHIPPED | OUTPATIENT
Start: 2024-12-04

## 2024-12-10 ENCOUNTER — OFFICE VISIT (OUTPATIENT)
Dept: INTERNAL MEDICINE CLINIC | Age: 44
End: 2024-12-10
Payer: COMMERCIAL

## 2024-12-10 VITALS
DIASTOLIC BLOOD PRESSURE: 88 MMHG | HEART RATE: 109 BPM | BODY MASS INDEX: 27.46 KG/M2 | HEIGHT: 62 IN | OXYGEN SATURATION: 98 % | WEIGHT: 149.2 LBS | SYSTOLIC BLOOD PRESSURE: 124 MMHG

## 2024-12-10 DIAGNOSIS — J45.20 MILD INTERMITTENT ASTHMA, UNSPECIFIED WHETHER COMPLICATED: ICD-10-CM

## 2024-12-10 DIAGNOSIS — G56.01 RIGHT CARPAL TUNNEL SYNDROME: ICD-10-CM

## 2024-12-10 DIAGNOSIS — Z01.818 PREOP EXAMINATION: Primary | ICD-10-CM

## 2024-12-10 PROCEDURE — 99214 OFFICE O/P EST MOD 30 MIN: CPT | Performed by: NURSE PRACTITIONER

## 2024-12-10 NOTE — PROGRESS NOTES
Preoperative Consultation      Brooke Gomez  YOB: 1980    Date of Service:  12/10/2024    This patient presents today at the request of the surgeon for a preoperative consultation.     Surgeon: Dr. Engle  Indication for surgery: Right wrist CTS  Scheduled procedure: Right wrist carpal tunnel release, ulnar nerve neurolysis, lateral epicondylectomy   Date of surgery: 1/9/25  Location of surgery: [facility]  Indicated/required preoperative testing: H&P  Smoker: No  Previous anesthesia complications: No    Family history of anesthesia complications: No  Loose, capped or false teeth: No  Recent chest pain or SOB: No  Known Bleeding Risk: No recent or remote history of abnormal bleeding  Personal or FH of DVT/PE: No    Patient objection to receiving blood products: No      No Known Allergies      Current Outpatient Medications   Medication Sig Dispense Refill    buPROPion (WELLBUTRIN XL) 150 MG extended release tablet Take 3 tablets by mouth every morning 270 tablet 1    furosemide (LASIX) 20 MG tablet Take 1 tablet by mouth daily 90 tablet 3    Erenumab-aooe 70 MG/ML SOAJ Inject 70 mg into the skin every 30 days 3 mL 2    ALPRAZolam (XANAX) 0.5 MG tablet Take 1 tablet by mouth nightly as needed for Sleep (PRN).      valACYclovir (VALTREX) 500 MG tablet Take 1 tablet by mouth daily 90 tablet 3    rizatriptan (MAXALT) 10 MG tablet 1 at onset May repeat in 2 hours x2 within 24 hrs if needed for HA 9 tablet 3    esomeprazole (NEXIUM) 20 MG delayed release capsule Take 1 capsule by mouth daily 90 capsule 3    albuterol sulfate HFA (PROVENTIL HFA) 108 (90 Base) MCG/ACT inhaler Inhale 1 puff into the lungs every 4 hours as needed for Wheezing or Shortness of Breath 1 Inhaler 5     No current facility-administered medications for this visit.       Patient Active Problem List   Diagnosis    Atopy    Anxiety    Asthma    Irregular heartbeat    Tachycardia    Hemorrhagic ovarian cyst       Past Medical History:

## 2024-12-11 DIAGNOSIS — B37.9 YEAST INFECTION: Primary | ICD-10-CM

## 2024-12-11 RX ORDER — FLUCONAZOLE 150 MG/1
150 TABLET ORAL
Qty: 2 TABLET | Refills: 0 | Status: SHIPPED | OUTPATIENT
Start: 2024-12-11 | End: 2024-12-15

## 2024-12-26 ENCOUNTER — TELEPHONE (OUTPATIENT)
Dept: PHARMACY | Age: 44
End: 2024-12-26

## 2024-12-26 NOTE — TELEPHONE ENCOUNTER
Specialty Medication Service    Date: 12/26/2024  Patient's Name: Brooke Gomez YOB: 1980            _____________________________________________________________________________________________    Spoke with patient to schedule PharmD follow up appointment for Specialty Medication Services. Patient scheduled 01/21/25 at 9am.      Jannie Rivera CPhT  Pharmacy   Specialty Medication Services   Phone: [phone] option 4    For Pharmacy Admin Tracking Only    Program: [facility]  CPA in place:  Yes  Recommendation Provided To: Patient/Caregiver: 1 via Telephone  Intervention Detail: Scheduled Appointment  Intervention Accepted By: Patient/Caregiver: 1  Gap Closed?:    Time Spent (min): 15

## 2025-01-03 ENCOUNTER — TELEPHONE (OUTPATIENT)
Dept: INTERNAL MEDICINE CLINIC | Age: 45
End: 2025-01-03

## 2025-01-03 NOTE — TELEPHONE ENCOUNTER
Patient requested a copy of her Pre Op Exam to take with her on the day of her procedure. I printed this & gave it to the patient.    PRE-OP DIAGNOSIS:  Ectopic pregnancy 28-Mar-2022 20:59:51  Andry Lerner

## 2025-01-07 NOTE — DISCHARGE INSTRUCTIONS
[facility], [facility]  [phone]    POSTOPERATIVE INSTRUCTIONS    - For the first 48 hours after your surgery, rest and elevate the surgical area as much as possible.     - Application of an ice pack to the area is helpful. A small amount of drainage or swelling may be expected.    - Side effects to anesthesia may include nausea, lightheadedness, coughing, sore throat, and/or muscle aches. Rest, fluids, and light foods can help.    Please call our office if:      - Your hand or foot becomes numb, blue, or cold. It is normal to experience numbness  up to 24 hrs after surgery if you did receive a nerve block    - You have severe pain or burning which is not relieved with your pain medication.     - Your incision has become reddened or swollen, painful or has excessive bleeding or foul smelling drainage.    Call tomorrow for an appointment to be seen next Thursday if you do not have one already    You have been given prescriptions for:  Oxycodone, Ibuprofen     Take OTC tylenol 1000mg every 8 hrs     Take OTC Colace 100 mg every 12 hrs     If your upper extremity was operated on,    A sling is  necessary for comfort, ok to discontinue after nerve block wears off    DO NOT REMOVE SPLINT    It is ok to shower 1 days after surgery   COVER SPLINT WITH PLASTIC BAG TO KEEP DRY    [place] DISCHARGE INSTRUCTIONS    1/9/2025     Wear your seatbelt home.  You are under the influence of drugs. Do not drink alcohol, drive, operate machinery, or make any important decisions or sign any legal documents for 24 hours  A responsible adult needs to be with you for 24 hours.  You may experience lightheadedness, dizziness, nausea, heightened emotions and/or sleepiness following surgery.  Rest at home today- increase activity as tolerated.  Progress slowly to a regular diet and drink plenty of fluids unless your physician has instructed you otherwise.  If nausea becomes a problem, call your physician.  Coughing, sore throat,

## 2025-01-08 ENCOUNTER — ANESTHESIA EVENT (OUTPATIENT)
Dept: OPERATING ROOM | Age: 45
End: 2025-01-08
Payer: COMMERCIAL

## 2025-01-08 ASSESSMENT — LIFESTYLE VARIABLES: SMOKING_STATUS: 0

## 2025-01-08 NOTE — ANESTHESIA PRE PROCEDURE
Department of Anesthesiology  Preprocedure Note       Name:  Brooke Gomez   Age:  44 y.o.  :  1980                                          MRN:  [id number]         Date:  2025      Surgeon: Surgeon(s):  Nathan Engle MD    Procedure: Procedure(s):  RIGHT WRIST CARPAL TUNNEL RELEASE,  RIGHT ELBOW ULNAR NERVE NEUROLYSIS, LATERAL EPICONDYLECTOMY    Medications prior to admission:   Prior to Admission medications    Medication Sig Start Date End Date Taking? Authorizing Provider   buPROPion (WELLBUTRIN XL) 150 MG extended release tablet Take 3 tablets by mouth every morning 24   Albino Obrien APRN - CNP   furosemide (LASIX) 20 MG tablet Take 1 tablet by mouth daily 24   Albino Obrien APRN - CNP   Erenumab-aooe 70 MG/ML SOAJ Inject 70 mg into the skin every 30 days 24   Germain Martines MD   ALPRAZolam (XANAX) 0.5 MG tablet Take 1 tablet by mouth nightly as needed for Sleep (PRN).    Provider, MD Ge   valACYclovir (VALTREX) 500 MG tablet Take 1 tablet by mouth daily 24   Albino Obrien, RICA - CNP   rizatriptan (MAXALT) 10 MG tablet 1 at onset May repeat in 2 hours x2 within 24 hrs if needed for WARNER 24   Dileep Kelly MD   esomeprazole (NEXIUM) 20 MG delayed release capsule Take 1 capsule by mouth daily 24   Dileep Kelly MD   albuterol sulfate HFA (PROVENTIL HFA) 108 (90 Base) MCG/ACT inhaler Inhale 1 puff into the lungs every 4 hours as needed for Wheezing or Shortness of Breath 19   Dileep Kelly MD       Current medications:    No current facility-administered medications for this encounter.     Current Outpatient Medications   Medication Sig Dispense Refill   • buPROPion (WELLBUTRIN XL) 150 MG extended release tablet Take 3 tablets by mouth every morning 270 tablet 1   • furosemide (LASIX) 20 MG tablet Take 1 tablet by mouth daily 90 tablet 3   • Erenumab-aooe 70 MG/ML SOAJ Inject 70 mg into the skin every 30 days 3

## 2025-01-09 ENCOUNTER — ANESTHESIA (OUTPATIENT)
Dept: OPERATING ROOM | Age: 45
End: 2025-01-09
Payer: COMMERCIAL

## 2025-01-09 ENCOUNTER — HOSPITAL ENCOUNTER (OUTPATIENT)
Age: 45
Setting detail: OUTPATIENT SURGERY
Discharge: HOME | End: 2025-01-09
Attending: ORTHOPAEDIC SURGERY | Admitting: ORTHOPAEDIC SURGERY
Payer: COMMERCIAL

## 2025-01-09 VITALS
HEART RATE: 86 BPM | TEMPERATURE: 97.6 F | RESPIRATION RATE: 13 BRPM | BODY MASS INDEX: 26.68 KG/M2 | OXYGEN SATURATION: 96 % | HEIGHT: 62 IN | DIASTOLIC BLOOD PRESSURE: 76 MMHG | SYSTOLIC BLOOD PRESSURE: 115 MMHG | WEIGHT: 145 LBS

## 2025-01-09 DIAGNOSIS — G56.21 CUBITAL CANAL COMPRESSION SYNDROME, RIGHT: Primary | ICD-10-CM

## 2025-01-09 DIAGNOSIS — M77.11 LATERAL EPICONDYLITIS OF RIGHT ELBOW: ICD-10-CM

## 2025-01-09 DIAGNOSIS — G56.01 CARPAL TUNNEL SYNDROME OF RIGHT WRIST: ICD-10-CM

## 2025-01-09 LAB
GLUCOSE BLD-MCNC: 88 MG/DL (ref 70–99)
PERFORMED ON: NORMAL

## 2025-01-09 PROCEDURE — 7100000000 HC PACU RECOVERY - FIRST 15 MIN: Performed by: ORTHOPAEDIC SURGERY

## 2025-01-09 PROCEDURE — 2580000003 HC RX 258: Performed by: ORTHOPAEDIC SURGERY

## 2025-01-09 PROCEDURE — 3700000001 HC ADD 15 MINUTES (ANESTHESIA): Performed by: ORTHOPAEDIC SURGERY

## 2025-01-09 PROCEDURE — 6360000002 HC RX W HCPCS

## 2025-01-09 PROCEDURE — 6360000002 HC RX W HCPCS: Performed by: ORTHOPAEDIC SURGERY

## 2025-01-09 PROCEDURE — 7100000001 HC PACU RECOVERY - ADDTL 15 MIN: Performed by: ORTHOPAEDIC SURGERY

## 2025-01-09 PROCEDURE — 2500000003 HC RX 250 WO HCPCS: Performed by: ORTHOPAEDIC SURGERY

## 2025-01-09 PROCEDURE — 6370000000 HC RX 637 (ALT 250 FOR IP): Performed by: ANESTHESIOLOGY

## 2025-01-09 PROCEDURE — 3600000003 HC SURGERY LEVEL 3 BASE: Performed by: ORTHOPAEDIC SURGERY

## 2025-01-09 PROCEDURE — 6360000002 HC RX W HCPCS: Performed by: NURSE ANESTHETIST, CERTIFIED REGISTERED

## 2025-01-09 PROCEDURE — 6360000002 HC RX W HCPCS: Performed by: ANESTHESIOLOGY

## 2025-01-09 PROCEDURE — 3700000000 HC ANESTHESIA ATTENDED CARE: Performed by: ORTHOPAEDIC SURGERY

## 2025-01-09 PROCEDURE — 7100000011 HC PHASE II RECOVERY - ADDTL 15 MIN: Performed by: ORTHOPAEDIC SURGERY

## 2025-01-09 PROCEDURE — 2709999900 HC NON-CHARGEABLE SUPPLY: Performed by: ORTHOPAEDIC SURGERY

## 2025-01-09 PROCEDURE — 7100000010 HC PHASE II RECOVERY - FIRST 15 MIN: Performed by: ORTHOPAEDIC SURGERY

## 2025-01-09 PROCEDURE — 3600000013 HC SURGERY LEVEL 3 ADDTL 15MIN: Performed by: ORTHOPAEDIC SURGERY

## 2025-01-09 RX ORDER — FENTANYL CITRATE 50 UG/ML
INJECTION, SOLUTION INTRAMUSCULAR; INTRAVENOUS
Status: DISCONTINUED | OUTPATIENT
Start: 2025-01-09 | End: 2025-01-09 | Stop reason: SDUPTHER

## 2025-01-09 RX ORDER — KETOROLAC TROMETHAMINE 30 MG/ML
INJECTION, SOLUTION INTRAMUSCULAR; INTRAVENOUS
Status: COMPLETED
Start: 2025-01-09 | End: 2025-01-09

## 2025-01-09 RX ORDER — HYDRALAZINE HYDROCHLORIDE 20 MG/ML
10 INJECTION INTRAMUSCULAR; INTRAVENOUS
Status: DISCONTINUED | OUTPATIENT
Start: 2025-01-09 | End: 2025-01-09 | Stop reason: HOSPADM

## 2025-01-09 RX ORDER — HYDROMORPHONE HYDROCHLORIDE 2 MG/ML
0.5 INJECTION, SOLUTION INTRAMUSCULAR; INTRAVENOUS; SUBCUTANEOUS EVERY 5 MIN PRN
Status: DISCONTINUED | OUTPATIENT
Start: 2025-01-09 | End: 2025-01-09 | Stop reason: HOSPADM

## 2025-01-09 RX ORDER — PROPOFOL 10 MG/ML
INJECTION, EMULSION INTRAVENOUS
Status: DISCONTINUED | OUTPATIENT
Start: 2025-01-09 | End: 2025-01-09 | Stop reason: SDUPTHER

## 2025-01-09 RX ORDER — ONDANSETRON 2 MG/ML
INJECTION INTRAMUSCULAR; INTRAVENOUS
Status: DISCONTINUED | OUTPATIENT
Start: 2025-01-09 | End: 2025-01-09 | Stop reason: SDUPTHER

## 2025-01-09 RX ORDER — OXYCODONE HYDROCHLORIDE 5 MG/1
5 TABLET ORAL EVERY 6 HOURS PRN
Qty: 28 TABLET | Refills: 0 | Status: SHIPPED | OUTPATIENT
Start: 2025-01-09 | End: 2025-01-16

## 2025-01-09 RX ORDER — ONDANSETRON 2 MG/ML
4 INJECTION INTRAMUSCULAR; INTRAVENOUS
Status: DISCONTINUED | OUTPATIENT
Start: 2025-01-09 | End: 2025-01-09 | Stop reason: HOSPADM

## 2025-01-09 RX ORDER — MEPERIDINE HYDROCHLORIDE 25 MG/ML
12.5 INJECTION INTRAMUSCULAR; INTRAVENOUS; SUBCUTANEOUS EVERY 5 MIN PRN
Status: DISCONTINUED | OUTPATIENT
Start: 2025-01-09 | End: 2025-01-09 | Stop reason: HOSPADM

## 2025-01-09 RX ORDER — DEXAMETHASONE SODIUM PHOSPHATE 4 MG/ML
INJECTION, SOLUTION INTRA-ARTICULAR; INTRALESIONAL; INTRAMUSCULAR; INTRAVENOUS; SOFT TISSUE
Status: DISCONTINUED | OUTPATIENT
Start: 2025-01-09 | End: 2025-01-09 | Stop reason: SDUPTHER

## 2025-01-09 RX ORDER — LABETALOL HYDROCHLORIDE 5 MG/ML
10 INJECTION, SOLUTION INTRAVENOUS
Status: DISCONTINUED | OUTPATIENT
Start: 2025-01-09 | End: 2025-01-09 | Stop reason: HOSPADM

## 2025-01-09 RX ORDER — MEPERIDINE HYDROCHLORIDE 25 MG/ML
25 INJECTION INTRAMUSCULAR; INTRAVENOUS; SUBCUTANEOUS ONCE
Status: DISCONTINUED | OUTPATIENT
Start: 2025-01-09 | End: 2025-01-09 | Stop reason: HOSPADM

## 2025-01-09 RX ORDER — SODIUM CHLORIDE 9 MG/ML
INJECTION, SOLUTION INTRAVENOUS CONTINUOUS
Status: DISCONTINUED | OUTPATIENT
Start: 2025-01-09 | End: 2025-01-09 | Stop reason: HOSPADM

## 2025-01-09 RX ORDER — OXYCODONE HYDROCHLORIDE 5 MG/1
5 TABLET ORAL
Status: COMPLETED | OUTPATIENT
Start: 2025-01-09 | End: 2025-01-09

## 2025-01-09 RX ORDER — KETOROLAC TROMETHAMINE 30 MG/ML
30 INJECTION, SOLUTION INTRAMUSCULAR; INTRAVENOUS ONCE
Status: COMPLETED | OUTPATIENT
Start: 2025-01-09 | End: 2025-01-09

## 2025-01-09 RX ORDER — IBUPROFEN 800 MG/1
800 TABLET, FILM COATED ORAL
Qty: 90 TABLET | Refills: 0 | Status: SHIPPED | OUTPATIENT
Start: 2025-01-09

## 2025-01-09 RX ORDER — MIDAZOLAM HYDROCHLORIDE 1 MG/ML
INJECTION, SOLUTION INTRAMUSCULAR; INTRAVENOUS
Status: DISCONTINUED | OUTPATIENT
Start: 2025-01-09 | End: 2025-01-09 | Stop reason: SDUPTHER

## 2025-01-09 RX ORDER — NALOXONE HYDROCHLORIDE 0.4 MG/ML
INJECTION, SOLUTION INTRAMUSCULAR; INTRAVENOUS; SUBCUTANEOUS PRN
Status: DISCONTINUED | OUTPATIENT
Start: 2025-01-09 | End: 2025-01-09 | Stop reason: HOSPADM

## 2025-01-09 RX ORDER — GLYCOPYRROLATE 1 MG/5 ML
SYRINGE (ML) INTRAVENOUS
Status: DISCONTINUED | OUTPATIENT
Start: 2025-01-09 | End: 2025-01-09 | Stop reason: SDUPTHER

## 2025-01-09 RX ORDER — LIDOCAINE HYDROCHLORIDE 10 MG/ML
0.5 INJECTION, SOLUTION EPIDURAL; INFILTRATION; INTRACAUDAL; PERINEURAL ONCE
Status: DISCONTINUED | OUTPATIENT
Start: 2025-01-09 | End: 2025-01-09 | Stop reason: HOSPADM

## 2025-01-09 RX ORDER — ACETAMINOPHEN 500 MG
1000 TABLET ORAL 3 TIMES DAILY
COMMUNITY
Start: 2025-01-09

## 2025-01-09 RX ORDER — APREPITANT 40 MG/1
40 CAPSULE ORAL ONCE
Status: COMPLETED | OUTPATIENT
Start: 2025-01-09 | End: 2025-01-09

## 2025-01-09 RX ORDER — LIDOCAINE HYDROCHLORIDE 20 MG/ML
INJECTION, SOLUTION EPIDURAL; INFILTRATION; INTRACAUDAL; PERINEURAL
Status: DISCONTINUED | OUTPATIENT
Start: 2025-01-09 | End: 2025-01-09 | Stop reason: SDUPTHER

## 2025-01-09 RX ORDER — MAGNESIUM HYDROXIDE 1200 MG/15ML
LIQUID ORAL CONTINUOUS PRN
Status: COMPLETED | OUTPATIENT
Start: 2025-01-09 | End: 2025-01-09

## 2025-01-09 RX ADMIN — HYDROMORPHONE HYDROCHLORIDE 0.5 MG: 2 INJECTION, SOLUTION INTRAMUSCULAR; INTRAVENOUS; SUBCUTANEOUS at 08:29

## 2025-01-09 RX ADMIN — APREPITANT 40 MG: 40 CAPSULE ORAL at 06:39

## 2025-01-09 RX ADMIN — SODIUM CHLORIDE: 9 INJECTION, SOLUTION INTRAVENOUS at 07:04

## 2025-01-09 RX ADMIN — DEXAMETHASONE SODIUM PHOSPHATE 8 MG: 4 INJECTION, SOLUTION INTRAMUSCULAR; INTRAVENOUS at 07:22

## 2025-01-09 RX ADMIN — HYDROMORPHONE HYDROCHLORIDE 0.5 MG: 2 INJECTION, SOLUTION INTRAMUSCULAR; INTRAVENOUS; SUBCUTANEOUS at 08:43

## 2025-01-09 RX ADMIN — FENTANYL CITRATE 50 MCG: 50 INJECTION, SOLUTION INTRAMUSCULAR; INTRAVENOUS at 07:14

## 2025-01-09 RX ADMIN — LIDOCAINE HYDROCHLORIDE 80 MG: 20 INJECTION, SOLUTION EPIDURAL; INFILTRATION; INTRACAUDAL; PERINEURAL at 07:07

## 2025-01-09 RX ADMIN — KETOROLAC TROMETHAMINE 30 MG: 30 INJECTION, SOLUTION INTRAMUSCULAR; INTRAVENOUS at 08:59

## 2025-01-09 RX ADMIN — KETOROLAC TROMETHAMINE 30 MG: 30 INJECTION, SOLUTION INTRAMUSCULAR at 08:59

## 2025-01-09 RX ADMIN — HYDROMORPHONE HYDROCHLORIDE 0.5 MG: 2 INJECTION, SOLUTION INTRAMUSCULAR; INTRAVENOUS; SUBCUTANEOUS at 08:19

## 2025-01-09 RX ADMIN — PROPOFOL 200 MG: 10 INJECTION, EMULSION INTRAVENOUS at 07:07

## 2025-01-09 RX ADMIN — ONDANSETRON 4 MG: 2 INJECTION, SOLUTION INTRAMUSCULAR; INTRAVENOUS at 07:22

## 2025-01-09 RX ADMIN — Medication 0.1 MG: at 07:25

## 2025-01-09 RX ADMIN — FENTANYL CITRATE 50 MCG: 50 INJECTION, SOLUTION INTRAMUSCULAR; INTRAVENOUS at 07:04

## 2025-01-09 RX ADMIN — MEPERIDINE HYDROCHLORIDE 12.5 MG: 25 INJECTION INTRAMUSCULAR; INTRAVENOUS; SUBCUTANEOUS at 08:59

## 2025-01-09 RX ADMIN — PHENYLEPHRINE HYDROCHLORIDE 100 MCG: 10 INJECTION INTRAVENOUS at 07:27

## 2025-01-09 RX ADMIN — PHENYLEPHRINE HYDROCHLORIDE 50 MCG: 10 INJECTION INTRAVENOUS at 07:48

## 2025-01-09 RX ADMIN — MIDAZOLAM 2 MG: 1 INJECTION INTRAMUSCULAR; INTRAVENOUS at 07:04

## 2025-01-09 RX ADMIN — CEFAZOLIN 2000 MG: 2 INJECTION, POWDER, FOR SOLUTION INTRAMUSCULAR; INTRAVENOUS at 07:15

## 2025-01-09 RX ADMIN — OXYCODONE 5 MG: 5 TABLET ORAL at 09:26

## 2025-01-09 ASSESSMENT — PAIN SCALES - GENERAL
PAINLEVEL_OUTOF10: 8
PAINLEVEL_OUTOF10: 7
PAINLEVEL_OUTOF10: 9

## 2025-01-09 ASSESSMENT — PAIN DESCRIPTION - LOCATION
LOCATION: ARM;OTHER (COMMENT)
LOCATION: ARM
LOCATION: ARM

## 2025-01-09 ASSESSMENT — PAIN DESCRIPTION - DESCRIPTORS
DESCRIPTORS: THROBBING
DESCRIPTORS: DISCOMFORT
DESCRIPTORS: SHARP;THROBBING

## 2025-01-09 ASSESSMENT — PAIN - FUNCTIONAL ASSESSMENT: PAIN_FUNCTIONAL_ASSESSMENT: 0-10

## 2025-01-09 NOTE — H&P
Date of Surgery Update:      Brooke Gomez  was seen, history and physical examination reviewed, and patient examined by me today. There have been no significant clinical changes since the completion of the previous history and physical.    The patient and I discussed that this is an elective procedure/surgery. We discussed the risks of the procedure/surgery, including but not limited to what is outlined in the signed informed consent. We also discussed the risk of porsche COVID 19 while in the facility. We discussed the increased risk of a bad outcome should the patient contract COVID 19 during the post-procedural/post-operative period, given the patient’s current health condition, chronic conditions, and the added risk of COVID 19 in light of these conditions. The patient and I also discussed the risk of further postponing the procedure/surgery and other treatment alternatives, including non-procedural/surgical treatments.       The risk, benefits, and alternatives of the proposed procedure have been explained to the patient (or appropriate guardian) and understanding verbalized. All questions answered. Patient wishes to proceed.        Electronically signed by: Nathan Engle MD,1/9/2025,6:31 AM

## 2025-01-09 NOTE — PROGRESS NOTES
Discharge instructions reviewed and understanding verbalized per pt/family with copy given. All home medications/new prescriptions have been reviewed, questions answered and patient/family state understanding. Medication information sheet provided for new prescriptions received when applicable. Patient's peripheral IV has been removed without complications. Patient has left unit with all belongings. Patient has left with  who is providing transportation.

## 2025-01-09 NOTE — OP NOTE
Operative Note      Patient: Brooke Gomez  YOB: 1980  MRN: [id number]    Date of Procedure: 1/9/2025    Pre-Op Diagnosis Codes:      * Carpal tunnel syndrome, right [G56.01]     * Lesion of right ulnar nerve [G56.21]    Post-Op Diagnosis: Same       Procedure(s):  RIGHT WRIST CARPAL TUNNEL RELEASE,  RIGHT ELBOW ULNAR NERVE NEUROLYSIS, LATERAL EPICONDYLECTOMY    Surgeon(s):  Nathan Engle MD    Assistant:   Surgical Assistant: Cruz Cordoba  Physician Assistant: Guy Good PA    Anesthesia: General    Estimated Blood Loss (mL): Minimal    Complications: None    Specimens:   * No specimens in log *    Implants:  * No implants in log *      Drains: * No LDAs found *    Findings:  Infection Present At Time Of Surgery (PATOS) (choose all levels that have infection present):  No infection present  Other Findings: CTS, CTS, Lateral Epicondylitis    Detailed Description of Procedure:   Operative Report    Patient: Brooke Gomez  YOB: 1980  Age: 44 y.o.    Sex: female  MRN: [id number]    DATE OF OPERATION : 1/9/2025        PREOPERATIVE DIAGNOSES:  1. Right ulnar nerve compression/cubital tunnel syndrome.  2. Right carpal tunnel syndrome.  3. Right lateral epicondylitis      POSTOPERATIVE DIAGNOSES:  1.Right  ulnar nerve compression/cubital tunnel syndrome.  2.Right  carpal tunnel syndrome.  3. Right lateral epicondylitis    PROCEDURES PERFORMED:  1.Right  ulnar nerve neurolysis.  2. Right  carpal tunnel release.  3. Right lateral epicondylectomy      COMPLICATIONS: None.      CONSULTATIONS: None.      ANESTHESIA: General endotracheal.      SURGEON: Nathan Engle MD    PHYSICIAN ASSISTANT: BROOK Lopez is a board certified physician assistant who is medically necessary as a first assistant in the operating room with me. He is responsible for assisting with positioning of the patient,retraction,cauterization and manipulation of the involved

## 2025-01-09 NOTE — PROGRESS NOTES
Patient to PACU from OR. Oral airway in. VSS. Patient on simple mask. Outerlayer of acewrap CDI. Ice applied. Right finger warm and cap refill<3 seconds.

## 2025-01-09 NOTE — PROGRESS NOTES
Patient has transitioned out of phase 1 care. VSS. Patient having tolerable pain and denies nausea.Outer layer of ace wrap CDI. Patient having full sensation in fingers, fingers are warm, and cap refill<3 seconds.  @ bedside.

## 2025-01-09 NOTE — ANESTHESIA POSTPROCEDURE EVALUATION
Department of Anesthesiology  Postprocedure Note    Patient: Brooke Gomez  MRN: [id number]  YOB: 1980  Date of evaluation: 1/9/2025    Procedure Summary       Date: 01/09/25 Room / Location: [address]    Anesthesia Start: 0704 Anesthesia Stop: 0807    Procedures:       RIGHT WRIST CARPAL TUNNEL RELEASE, (Right: Arm Lower)      RIGHT ELBOW ULNAR NERVE NEUROLYSIS, LATERAL EPICONDYLECTOMY (Right) Diagnosis:       Carpal tunnel syndrome, right      Lesion of right ulnar nerve      (Carpal tunnel syndrome, right [G56.01])      (Lesion of right ulnar nerve [G56.21])    Surgeons: Nathan Engle MD Responsible Provider: Theo Jennings MD    Anesthesia Type: General ASA Status: 2            Anesthesia Type: General    Vanessa Phase I: Vanessa Score: 10    Vanessa Phase II:      Anesthesia Post Evaluation    Patient location during evaluation: PACU  Patient participation: complete - patient participated  Level of consciousness: awake and alert  Airway patency: patent  Nausea & Vomiting: no vomiting and no nausea  Cardiovascular status: hemodynamically stable  Respiratory status: acceptable  Hydration status: stable  Pain management: adequate    No notable events documented.

## 2025-01-18 NOTE — PROGRESS NOTES
Specialty Medication Service    Patient's Name: Brooke Gomez YOB: 1980      Reason for visit: Brooke Gomez is a 44 y.o. female presenting today for Specialty Medication Service visit follow up. Patient last seen by [facility] 7/23/25. Patient continues on [facility] formulary medication, Aimovig. Pharmacy completed Specialty Medication Service visit for medication monitoring and counseling. Medication list updated.    Specialty Medication: Aimovig 70mg/mL Autoinjector   Frequency: Every 30 days   Indication: Intractable migraine without aura and with status migrainosus   Initially Diagnosed: several years ago but got severe ~2019  Additional Therapy:   Rizatriptan PRN   Previous Therapy:   Topiramate - facial twitching  Propranolol - felt \"blah\"  Ubrelvy and Nurtec - insurance      Specialist:   RICA Beltrán - [facility]   [address] [facility]  [phone]  Specialist Progress Note Available: Yes  Last Specialist Visit: has had other visits with provider since but none regarding migraines  7/10/24 - from MyChart \"propranolol is not agreeing with me. It makes me feel blah and I hate that feeling. I am willing to try Topamax again to see how I do. its been years since I took it but my biggest issue was facial twitching. Just whatever I need to do to get an injection covered is my goal. I have had 3 migraines since Thursday last week.\" Aimovig prescribed.   6/4/24 - recently went to the emergency department with the worst headache of her life.  Home remedies and typical migraine treatments had failed.  She was given migraine cocktail in the emergency department and had almost immediate improvement.  She started keeping a migraine log and has lots of migraines in the past 2 weeks.  These have been typical migraines for her.  She is currently using Maxalt for abortive therapy.  She feels this works well but it does cause fatigue. Start propranolol.     No Known Allergies

## 2025-01-21 ENCOUNTER — PHARMACY VISIT (OUTPATIENT)
Dept: PHARMACY | Age: 45
End: 2025-01-21

## 2025-01-21 ENCOUNTER — TELEPHONE (OUTPATIENT)
Dept: PHARMACY | Age: 45
End: 2025-01-21

## 2025-01-21 DIAGNOSIS — G43.011 INTRACTABLE MIGRAINE WITHOUT AURA AND WITH STATUS MIGRAINOSUS: Primary | ICD-10-CM

## 2025-01-21 ASSESSMENT — PATIENT HEALTH QUESTIONNAIRE - PHQ9
SUM OF ALL RESPONSES TO PHQ9 QUESTIONS 1 & 2: 0
SUM OF ALL RESPONSES TO PHQ QUESTIONS 1-9: 0
2. FEELING DOWN, DEPRESSED OR HOPELESS: NOT AT ALL
1. LITTLE INTEREST OR PLEASURE IN DOING THINGS: NOT AT ALL
SUM OF ALL RESPONSES TO PHQ QUESTIONS 1-9: 0

## 2025-01-21 NOTE — TELEPHONE ENCOUNTER
Specialty Medication Service    Date: 1/21/2025  Patient's Name: Brooke Gomez YOB: 1980            _____________________________________________________________________________________________    Patient's PA for Aimovig has been approved through 01/21/25-01/20/26. Approval letter uploaded in Media tab    Jannie Rivera CPhT  Pharmacy   Specialty Medication Services   Phone: [phone] option 4    For Pharmacy Admin Tracking Only    Program: [facility]  CPA in place:  Yes  Recommendation Provided To: Other: 1  Intervention Detail: Benefit Assistance  Intervention Accepted By: Other: 1  Gap Closed?:    Time Spent (min): 15

## 2025-01-21 NOTE — TELEPHONE ENCOUNTER
Specialty Medication Service    Date: 1/21/2025  Patient's Name: Brooke Gomez YOB: 1980            _____________________________________________________________________________________________    PA for patient's Aimovig submitted to [facility] for approval. Psych[facility] hospital key: [id number]. Will continue to monitor for PA determination.     Keily Beard, PharmD  Ambulatory Clinical Pharmacist   Specialty Medication Services   Phone: [phone] option 4  1/21/2025 9:54 AM

## 2025-02-07 ENCOUNTER — APPOINTMENT (OUTPATIENT)
Dept: GENERAL RADIOLOGY | Age: 45
End: 2025-02-07
Payer: COMMERCIAL

## 2025-02-07 ENCOUNTER — HOSPITAL ENCOUNTER (EMERGENCY)
Age: 45
Discharge: HOME | End: 2025-02-07
Attending: EMERGENCY MEDICINE
Payer: COMMERCIAL

## 2025-02-07 VITALS
TEMPERATURE: 98.2 F | HEART RATE: 98 BPM | OXYGEN SATURATION: 100 % | RESPIRATION RATE: 16 BRPM | DIASTOLIC BLOOD PRESSURE: 90 MMHG | SYSTOLIC BLOOD PRESSURE: 117 MMHG

## 2025-02-07 DIAGNOSIS — S41.111A LACERATION OF RIGHT UPPER ARM, INITIAL ENCOUNTER: Primary | ICD-10-CM

## 2025-02-07 PROCEDURE — 73080 X-RAY EXAM OF ELBOW: CPT

## 2025-02-07 PROCEDURE — 6370000000 HC RX 637 (ALT 250 FOR IP): Performed by: NURSE PRACTITIONER

## 2025-02-07 PROCEDURE — 12002 RPR S/N/AX/GEN/TRNK2.6-7.5CM: CPT

## 2025-02-07 PROCEDURE — 73090 X-RAY EXAM OF FOREARM: CPT

## 2025-02-07 PROCEDURE — 99283 EMERGENCY DEPT VISIT LOW MDM: CPT

## 2025-02-07 RX ORDER — OXYCODONE HYDROCHLORIDE 5 MG/1
5 TABLET ORAL ONCE
Status: COMPLETED | OUTPATIENT
Start: 2025-02-07 | End: 2025-02-07

## 2025-02-07 RX ORDER — CEPHALEXIN 500 MG/1
500 CAPSULE ORAL 3 TIMES DAILY
Qty: 30 CAPSULE | Refills: 0 | Status: SHIPPED | OUTPATIENT
Start: 2025-02-07 | End: 2025-02-17

## 2025-02-07 RX ADMIN — OXYCODONE HYDROCHLORIDE 5 MG: 5 TABLET ORAL at 19:30

## 2025-02-07 RX ADMIN — CEPHALEXIN 500 MG: 250 CAPSULE ORAL at 21:24

## 2025-02-07 ASSESSMENT — PAIN DESCRIPTION - LOCATION: LOCATION: ARM

## 2025-02-07 ASSESSMENT — PAIN DESCRIPTION - ORIENTATION: ORIENTATION: RIGHT

## 2025-02-07 ASSESSMENT — PAIN SCALES - GENERAL: PAINLEVEL_OUTOF10: 10

## 2025-02-08 ASSESSMENT — ENCOUNTER SYMPTOMS
DIARRHEA: 0
ABDOMINAL PAIN: 0
CHEST TIGHTNESS: 0
SHORTNESS OF BREATH: 0
NAUSEA: 0
VOMITING: 0

## 2025-02-08 NOTE — ED PROVIDER NOTES
[facility] Emergency Department      Pt Name: Brooke Gomez  MRN: [id number]  Birthdate 1980  Date of evaluation: 2/7/2025  Provider: AG WARD MD  I personally saw Brooke Gomez and made and approved the management plan with the advanced practice provider.  I take responsibility for the patient management.     HPI: Brooke Gomez presented with   Chief Complaint   Patient presents with    Laceration     Patient hd surgery on 1/9 and fell going up the stairs and tore stitches from surgery open on right forearm     Brooke Gomez has a past medical history of Anxiety, Asthma, Depression, Ectopic pregnancy, Endometriosis, Headache, Kidney stones, and Menorrhagia with irregular cycle.  She has a past surgical history that includes Ectopic pregnancy surgery; Appendectomy; Ureter stent placement; [place] tooth extraction (1999); Endometrial ablation; other surgical history; Hysterectomy, vaginal (Bilateral, 05/19/2023); Vagina surgery (N/A, 08/29/2023); Hysterectomy; Colonoscopy (N/A, 8/12/2024); Carpal tunnel release (Right, 1/9/2025); and Arm Surgery (Right, 1/9/2025).    No current facility-administered medications on file prior to encounter.     Current Outpatient Medications on File Prior to Encounter   Medication Sig Dispense Refill    buPROPion (WELLBUTRIN XL) 150 MG extended release tablet Take 3 tablets by mouth every morning 270 tablet 1    furosemide (LASIX) 20 MG tablet Take 1 tablet by mouth daily 90 tablet 3    Erenumab-aooe 70 MG/ML SOAJ Inject 70 mg into the skin every 30 days 3 mL 2    valACYclovir (VALTREX) 500 MG tablet Take 1 tablet by mouth daily 90 tablet 3    rizatriptan (MAXALT) 10 MG tablet 1 at onset May repeat in 2 hours x2 within 24 hrs if needed for HA 9 tablet 3    esomeprazole (NEXIUM) 20 MG delayed release capsule Take 1 capsule by mouth daily 90 capsule 3    albuterol sulfate HFA (PROVENTIL HFA) 108 (90 Base) MCG/ACT inhaler Inhale 1 puff into the lungs every 4 hours as

## 2025-02-08 NOTE — ED PROVIDER NOTES
ALLERGIES     Patient has no known allergies.    FAMILYHISTORY       Family History   Problem Relation Age of Onset    High Blood Pressure Mother         DDD    Diabetes Mother     High Blood Pressure Father     Diabetes Father     Breast Cancer Maternal Grandmother         over 50    Diabetes Maternal Grandmother     Arthritis Maternal Grandmother         DDD    Cancer Maternal Grandmother     High Blood Pressure Maternal Grandmother     Colon Cancer Maternal Grandmother     Cancer Maternal Grandfather     Heart Disease Paternal Grandmother     Cancer Paternal Grandfather     Diabetes Paternal Grandfather     Heart Disease Paternal Grandfather     Stroke Paternal Grandfather         SOCIAL HISTORY       Social History     Tobacco Use    Smoking status: Former     Current packs/day: 0.00     Types: Cigarettes     Quit date:      Years since quittin.1    Smokeless tobacco: Never    Tobacco comments:     Quit smoking : started again and quit again in    Vaping Use    Vaping status: Some Days   Substance Use Topics    Alcohol use: Yes     Alcohol/week: 6.0 standard drinks of alcohol     Types: 6 Shots of liquor per week     Comment: socially    Drug use: Never       SCREENINGS        Delta Coma Scale  Eye Opening: Spontaneous  Best Verbal Response: Oriented  Best Motor Response: Obeys commands  Delta Coma Scale Score: 15                CIWA Assessment  BP: (!) 117/90  Pulse: 98           PHYSICAL EXAM  1 or more Elements     ED Triage Vitals [25]   BP Systolic BP Percentile Diastolic BP Percentile Temp Temp Source Pulse Respirations SpO2   -- -- -- 98.2 °F (36.8 °C) Oral (!) 109 16 100 %      Height Weight         -- --             Physical Exam  Vitals and nursing note reviewed.   Constitutional:       Appearance: She is well-developed. She is not diaphoretic.   HENT:      Head: Normocephalic and atraumatic.      Right Ear: External ear normal.      Left Ear: External ear    2043 I did speak with JOHN Dubois, [facility] and shared digital imaging of the wounds.  He did advise to close the wounds and place the patient on prophylactic Keflex 500 mg 3 times daily x 10 days.  Follow-up with JOHN Lopez [KF]      ED Course User Index  [KF] Hilda Jerry, APRN - CNP        Is this patient to be included in the SEP-1 Core Measure due to severe sepsis or septic shock?   No   Exclusion criteria - the patient is NOT to be included for SEP-1 Core Measure due to:  Infection is not suspected    CONSULTS: (Who and What was discussed)  IP CONSULT TO ORTHOPEDIC SURGERY  Discussion with Other Profesionals : Consultant [facility], I did speak with Ranjeet Haynes, I did share imaging as well as history.  He did speak with Dr. Snow's PA, Guy Good, and he recommended keflex 500 mg TID x 10 days and primary closure of wound.    Social Determinants : None    Records Reviewed : Inpatient Notes dr. Engle   Procedure(s):  RIGHT WRIST CARPAL TUNNEL RELEASE,  RIGHT ELBOW ULNAR NERVE NEUROLYSIS, LATERAL EPICONDYLECTOMY    CC/HPI Summary, DDx, ED Course, and Reassessment:     Briefly, this is a 44 year old female who presents to the ER with wound dehiscence/laceration to right upper extremity.  See photo for description of wound.  The patient did have surgery with [facility]-for right elbow ulnar nerve neurolysis, lateral epicondylectomy on 1/9/2025.  The patient reports that she was healing well.  States that she was walking up her concrete steps outside tonight, tripped, landing on the elbow/resulting in laceration.    XR RADIUS ULNA RIGHT (2 VIEWS) (Final result)  Result time 02/07/25 20:02:32  Final result by Stefan Pride MD (02/07/25 20:02:32)                Impression:    No acute abnormality of the right forearm or right elbow.              XR ELBOW RIGHT (MIN 3 VIEWS) (Final result)  Result time 02/07/25 20:02:32  Final result by Stefan Pride MD

## 2025-02-19 ENCOUNTER — TELEPHONE (OUTPATIENT)
Dept: INTERNAL MEDICINE CLINIC | Age: 45
End: 2025-02-19

## 2025-02-19 DIAGNOSIS — L30.9 DERMATITIS: Primary | ICD-10-CM

## 2025-02-19 RX ORDER — TRIAMCINOLONE ACETONIDE 1 MG/G
CREAM TOPICAL
Qty: 45 G | Refills: 1 | Status: SHIPPED | OUTPATIENT
Start: 2025-02-19

## 2025-02-19 NOTE — TELEPHONE ENCOUNTER
Requesting Triamcinolone cream for dermatitis, she has a spot behind her knee she has tried OTC meds with no relief.

## 2025-02-20 ENCOUNTER — E-VISIT (OUTPATIENT)
Dept: PRIMARY CARE CLINIC | Age: 45
End: 2025-02-20
Payer: COMMERCIAL

## 2025-02-20 DIAGNOSIS — J11.1 INFLUENZA: Primary | ICD-10-CM

## 2025-02-20 PROCEDURE — 99422 OL DIG E/M SVC 11-20 MIN: CPT | Performed by: NURSE PRACTITIONER

## 2025-02-20 RX ORDER — OSELTAMIVIR PHOSPHATE 75 MG/1
75 CAPSULE ORAL 2 TIMES DAILY
Qty: 10 CAPSULE | Refills: 0 | Status: SHIPPED | OUTPATIENT
Start: 2025-02-20 | End: 2025-02-25

## 2025-02-20 ASSESSMENT — LIFESTYLE VARIABLES
SMOKING_YEARS: 15
PACKS_PER_DAY: 1
SMOKING_STATUS: NO, BUT I USED TO SMOKE

## 2025-02-20 NOTE — PROGRESS NOTES
Brooke Gomez (1980) initiated an asynchronous digital communication through [facility].    HPI: per patient questionnaire     Exam: not applicable    Diagnoses and all orders for this visit:  Diagnoses and all orders for this visit:    Influenza    Other orders  -     oseltamivir (TAMIFLU) 75 MG capsule; Take 1 capsule by mouth 2 times daily for 5 days      + for flu today. Tamiflu sent. Supportive care suggestions provided. Fu with pcp as needed    Time: EV2 - 11-20 minutes were spent on the digital evaluation and management of this patient. 11 min     RICA Holguin - CNP

## 2025-03-13 NOTE — TELEPHONE ENCOUNTER
Recent Visits  Date Type Provider Dept   12/10/24 Office Visit Albino Obrien, APRN - CNP [facility]   09/12/24 Office Visit Albino Obrien, APRN - CNP [facility]   06/04/24 Office Visit Albino Obrien, APRN - CNP [facility]   05/21/24 Office Visit Albino Obrien, APRN - CNP [facility]   05/02/24 Office Visit Albino Obrien, APRN - CNP [facility]   01/12/24 Office Visit Dileep Kelly MD [facility]   Showing recent visits within past 540 days with a meds authorizing provider and meeting all other requirements  Future Appointments  No visits were found meeting these conditions.  Showing future appointments within next 150 days with a meds authorizing provider and meeting all other requirements     1/12/2024

## 2025-03-13 NOTE — TELEPHONE ENCOUNTER
Last OV: 12/10/2024  Next OV: Visit date not found    Next appointment due:    Last fill: 1/24/2024  Refills: #90/3

## 2025-03-18 SDOH — ECONOMIC STABILITY: INCOME INSECURITY: IN THE LAST 12 MONTHS, WAS THERE A TIME WHEN YOU WERE NOT ABLE TO PAY THE MORTGAGE OR RENT ON TIME?: NO

## 2025-03-18 SDOH — ECONOMIC STABILITY: TRANSPORTATION INSECURITY
IN THE PAST 12 MONTHS, HAS THE LACK OF TRANSPORTATION KEPT YOU FROM MEDICAL APPOINTMENTS OR FROM GETTING MEDICATIONS?: NO

## 2025-03-18 SDOH — ECONOMIC STABILITY: FOOD INSECURITY: WITHIN THE PAST 12 MONTHS, THE FOOD YOU BOUGHT JUST DIDN'T LAST AND YOU DIDN'T HAVE MONEY TO GET MORE.: NEVER TRUE

## 2025-03-18 SDOH — ECONOMIC STABILITY: FOOD INSECURITY: WITHIN THE PAST 12 MONTHS, YOU WORRIED THAT YOUR FOOD WOULD RUN OUT BEFORE YOU GOT MONEY TO BUY MORE.: NEVER TRUE

## 2025-03-18 SDOH — ECONOMIC STABILITY: TRANSPORTATION INSECURITY
IN THE PAST 12 MONTHS, HAS LACK OF TRANSPORTATION KEPT YOU FROM MEETINGS, WORK, OR FROM GETTING THINGS NEEDED FOR DAILY LIVING?: NO

## 2025-03-19 ENCOUNTER — OFFICE VISIT (OUTPATIENT)
Dept: INTERNAL MEDICINE CLINIC | Age: 45
End: 2025-03-19
Payer: COMMERCIAL

## 2025-03-19 VITALS
SYSTOLIC BLOOD PRESSURE: 142 MMHG | OXYGEN SATURATION: 96 % | HEIGHT: 62 IN | DIASTOLIC BLOOD PRESSURE: 96 MMHG | WEIGHT: 156 LBS | BODY MASS INDEX: 28.71 KG/M2 | HEART RATE: 91 BPM

## 2025-03-19 DIAGNOSIS — R79.89 LOW TESTOSTERONE LEVEL IN FEMALE: ICD-10-CM

## 2025-03-19 DIAGNOSIS — M25.50 POLYARTHRALGIA: ICD-10-CM

## 2025-03-19 DIAGNOSIS — R53.83 FATIGUE, UNSPECIFIED TYPE: ICD-10-CM

## 2025-03-19 DIAGNOSIS — R60.9 FLUID RETENTION: ICD-10-CM

## 2025-03-19 DIAGNOSIS — Z00.00 ANNUAL PHYSICAL EXAM: Primary | ICD-10-CM

## 2025-03-19 DIAGNOSIS — F33.1 MODERATE EPISODE OF RECURRENT MAJOR DEPRESSIVE DISORDER (HCC): ICD-10-CM

## 2025-03-19 PROCEDURE — 99396 PREV VISIT EST AGE 40-64: CPT | Performed by: NURSE PRACTITIONER

## 2025-03-19 ASSESSMENT — ENCOUNTER SYMPTOMS
GASTROINTESTINAL NEGATIVE: 1
RESPIRATORY NEGATIVE: 1

## 2025-03-19 NOTE — PROGRESS NOTES
Cortisol AM, Total    Fluid retention  - Chronic, ongoing problem  - Check labs  -     Comprehensive Metabolic Panel; Future  -     CBC with Auto Differential; Future  -     Hemoglobin A1C; Future  -     LIPID PANEL; Future  -     TSH reflex to FT4; Future  -     Testosterone, free, total; Future  -     C-Reactive Protein; Future  -     Sedimentation Rate; Future  -     Cortisol AM, Total    Low testosterone level in female  -     Testosterone, free, total; Future  -     Cortisol AM, Total    Moderate episode of recurrent major depressive disorder (HCC)  -     Comprehensive Metabolic Panel; Future  -     TSH reflex to FT4; Future      Evaluate patient for obesity related insulin resistance/metabolic syndrome.  Consider referral to endocrinology if initial labs are unyielding given symptomology.      Albino Obrien, APRN - CNP

## 2025-03-20 DIAGNOSIS — R79.89 LOW TESTOSTERONE LEVEL IN FEMALE: ICD-10-CM

## 2025-03-20 DIAGNOSIS — M25.50 POLYARTHRALGIA: ICD-10-CM

## 2025-03-20 DIAGNOSIS — F33.1 MODERATE EPISODE OF RECURRENT MAJOR DEPRESSIVE DISORDER (HCC): ICD-10-CM

## 2025-03-20 DIAGNOSIS — R60.9 FLUID RETENTION: ICD-10-CM

## 2025-03-20 DIAGNOSIS — R53.83 FATIGUE, UNSPECIFIED TYPE: ICD-10-CM

## 2025-03-20 DIAGNOSIS — Z00.00 ANNUAL PHYSICAL EXAM: ICD-10-CM

## 2025-03-20 LAB
ALBUMIN SERPL-MCNC: 4.2 G/DL (ref 3.4–5)
ALBUMIN/GLOB SERPL: 1.8 {RATIO} (ref 1.1–2.2)
ALP SERPL-CCNC: 62 U/L (ref 40–129)
ALT SERPL-CCNC: 22 U/L (ref 10–40)
ANION GAP SERPL CALCULATED.3IONS-SCNC: 12 MMOL/L (ref 3–16)
AST SERPL-CCNC: 19 U/L (ref 15–37)
BASOPHILS # BLD: 0.1 K/UL (ref 0–0.2)
BASOPHILS NFR BLD: 1.3 %
BILIRUB SERPL-MCNC: 0.3 MG/DL (ref 0–1)
BUN SERPL-MCNC: 10 MG/DL (ref 7–20)
CALCIUM SERPL-MCNC: 9.4 MG/DL (ref 8.3–10.6)
CHLORIDE SERPL-SCNC: 103 MMOL/L (ref 99–110)
CHOLEST SERPL-MCNC: 201 MG/DL (ref 0–199)
CO2 SERPL-SCNC: 25 MMOL/L (ref 21–32)
CORTIS AM PEAK SERPL-MCNC: 14.4 UG/DL (ref 4.3–22.4)
CREAT SERPL-MCNC: 0.7 MG/DL (ref 0.6–1.1)
CRP SERPL-MCNC: <3 MG/L (ref 0–5.1)
DEPRECATED RDW RBC AUTO: 13 % (ref 12.4–15.4)
EOSINOPHIL # BLD: 0.2 K/UL (ref 0–0.6)
EOSINOPHIL NFR BLD: 3.7 %
ERYTHROCYTE [SEDIMENTATION RATE] IN BLOOD BY WESTERGREN METHOD: 2 MM/HR (ref 0–20)
GFR SERPLBLD CREATININE-BSD FMLA CKD-EPI: >90 ML/MIN/{1.73_M2}
GLUCOSE SERPL-MCNC: 98 MG/DL (ref 70–99)
HCT VFR BLD AUTO: 42.8 % (ref 36–48)
HDLC SERPL-MCNC: 90 MG/DL (ref 40–60)
HGB BLD-MCNC: 14.5 G/DL (ref 12–16)
LDLC SERPL CALC-MCNC: 83 MG/DL
LYMPHOCYTES # BLD: 1.3 K/UL (ref 1–5.1)
LYMPHOCYTES NFR BLD: 24.3 %
MCH RBC QN AUTO: 32.9 PG (ref 26–34)
MCHC RBC AUTO-ENTMCNC: 33.9 G/DL (ref 31–36)
MCV RBC AUTO: 97.1 FL (ref 80–100)
MONOCYTES # BLD: 0.4 K/UL (ref 0–1.3)
MONOCYTES NFR BLD: 7.1 %
NEUTROPHILS # BLD: 3.5 K/UL (ref 1.7–7.7)
NEUTROPHILS NFR BLD: 63.6 %
PLATELET # BLD AUTO: 217 K/UL (ref 135–450)
PMV BLD AUTO: 10.6 FL (ref 5–10.5)
POTASSIUM SERPL-SCNC: 4.7 MMOL/L (ref 3.5–5.1)
PROT SERPL-MCNC: 6.6 G/DL (ref 6.4–8.2)
RBC # BLD AUTO: 4.41 M/UL (ref 4–5.2)
SODIUM SERPL-SCNC: 140 MMOL/L (ref 136–145)
TRIGL SERPL-MCNC: 139 MG/DL (ref 0–150)
TSH SERPL DL<=0.005 MIU/L-ACNC: 1.41 UIU/ML (ref 0.27–4.2)
VLDLC SERPL CALC-MCNC: 28 MG/DL
WBC # BLD AUTO: 5.5 K/UL (ref 4–11)

## 2025-03-21 ENCOUNTER — RESULTS FOLLOW-UP (OUTPATIENT)
Dept: INTERNAL MEDICINE CLINIC | Age: 45
End: 2025-03-21

## 2025-03-21 LAB
EST. AVERAGE GLUCOSE BLD GHB EST-MCNC: 79.6 MG/DL
HBA1C MFR BLD: 4.4 %
SHBG SERPL-SCNC: 90 NMOL/L (ref 25–122)
TESTOST FREE SERPL-MCNC: 1.3 PG/ML (ref 1.1–5.8)
TESTOST SERPL-MCNC: 15 NG/DL (ref 8–48)

## 2025-03-22 LAB
INSULIN FREE SERPL-ACNC: 29 UIU/ML (ref 3–25)
INSULIN SERPL-ACNC: 36 UIU/ML (ref 3–25)

## 2025-03-24 ENCOUNTER — RESULTS FOLLOW-UP (OUTPATIENT)
Dept: INTERNAL MEDICINE CLINIC | Age: 45
End: 2025-03-24

## 2025-03-28 ENCOUNTER — CLINICAL SUPPORT (OUTPATIENT)
Dept: INTERNAL MEDICINE CLINIC | Age: 45
End: 2025-03-28
Payer: COMMERCIAL

## 2025-03-28 DIAGNOSIS — I10 PRIMARY HYPERTENSION: ICD-10-CM

## 2025-03-28 DIAGNOSIS — E16.1 INAPPROPRIATELY HIGH SERUM INSULIN: ICD-10-CM

## 2025-03-28 DIAGNOSIS — E66.3 OVERWEIGHT (BMI 25.0-29.9): Primary | ICD-10-CM

## 2025-03-28 DIAGNOSIS — G43.011 INTRACTABLE MIGRAINE WITHOUT AURA AND WITH STATUS MIGRAINOSUS: Primary | ICD-10-CM

## 2025-03-28 DIAGNOSIS — Z83.3 FAMILY HISTORY OF DIABETES MELLITUS: ICD-10-CM

## 2025-03-28 PROCEDURE — 96372 THER/PROPH/DIAG INJ SC/IM: CPT | Performed by: NURSE PRACTITIONER

## 2025-03-28 RX ORDER — KETOROLAC TROMETHAMINE 30 MG/ML
30 INJECTION, SOLUTION INTRAMUSCULAR; INTRAVENOUS ONCE
Status: COMPLETED | OUTPATIENT
Start: 2025-03-28 | End: 2025-03-28

## 2025-03-28 RX ORDER — KETOROLAC TROMETHAMINE 30 MG/ML
60 INJECTION, SOLUTION INTRAMUSCULAR; INTRAVENOUS ONCE
Status: SHIPPED | OUTPATIENT
Start: 2025-03-28 | End: 2025-04-02

## 2025-03-28 RX ADMIN — KETOROLAC TROMETHAMINE 30 MG: 30 INJECTION, SOLUTION INTRAMUSCULAR; INTRAVENOUS at 10:11

## 2025-03-31 ENCOUNTER — TELEPHONE (OUTPATIENT)
Dept: INTERNAL MEDICINE CLINIC | Age: 45
End: 2025-03-31

## 2025-03-31 NOTE — TELEPHONE ENCOUNTER
PA needed for Tirzepatide.   DX   E88.819,   I10,   Z68.38,   R79.1,   E88.10 (diagnosed 2/1/21) Waist Circumference 37  Both parents Type 2.   Tried and failed Metformin 6/1/24-11/1/24 (water rentention)  Rybelsus 2/1/21-4/1/24  successful but unable to continue due to cost/coverage.

## 2025-04-01 NOTE — TELEPHONE ENCOUNTER
Submitted PA for Tirzepatide 2.5 MG/0.5ML SOAJ  Via CM (Key: [id number]) STATUS: PENDING.    Follow up done daily; if no decision with in three days we will refax.  If another three days goes by with no decision will call the insurance for status.

## 2025-04-02 NOTE — TELEPHONE ENCOUNTER
The medication was DENIED; DENIAL letter is uploaded to MEDIA.      DIABETIC MEDICATION DENIALS:  Other; please see Denial Letter.     Note :    Our guideline named GLP-1 AGONIST (Bydureon BCise, Byetta, Mounjaro, Ozempic, Rybelsus, Trulicity, Victoza [liraglutide]) requires the following rule(s) be met for approval:   A. You have type 2 diabetes (a disorder with high blood sugar)   B. Your diagnosis of type 2 diabetes is confirmed by medical records OR chart notes    If you want an APPEAL; please note in this encounter what new information you would like to APPEAL with.  Once complete route back to PA POOL.    If this requires a response please respond to the pool ( [facility] MEDICATION PRE-AUTH).      Thank you please advise patient.

## 2025-05-01 ENCOUNTER — E-VISIT (OUTPATIENT)
Dept: PRIMARY CARE CLINIC | Age: 45
End: 2025-05-01
Payer: COMMERCIAL

## 2025-05-01 DIAGNOSIS — U07.1 COVID: Primary | ICD-10-CM

## 2025-05-01 PROCEDURE — 99422 OL DIG E/M SVC 11-20 MIN: CPT | Performed by: NURSE PRACTITIONER

## 2025-05-01 ASSESSMENT — LIFESTYLE VARIABLES
PACKS_PER_DAY: 1
SMOKING_STATUS: NO, BUT I USED TO SMOKE
SMOKING_YEARS: 10

## 2025-05-05 DIAGNOSIS — R50.9 FEVER, UNSPECIFIED FEVER CAUSE: Primary | ICD-10-CM

## 2025-05-05 LAB
Lab: ABNORMAL
QC PASS/FAIL: ABNORMAL
SARS-COV-2 RDRP RESP QL NAA+PROBE: POSITIVE

## 2025-05-05 PROCEDURE — 87635 SARS-COV-2 COVID-19 AMP PRB: CPT | Performed by: NURSE PRACTITIONER

## 2025-06-10 ENCOUNTER — TELEPHONE (OUTPATIENT)
Dept: INTERNAL MEDICINE CLINIC | Age: 45
End: 2025-06-10

## 2025-06-10 DIAGNOSIS — B37.9 YEAST INFECTION: Primary | ICD-10-CM

## 2025-06-10 RX ORDER — FLUCONAZOLE 150 MG/1
150 TABLET ORAL
Qty: 2 TABLET | Refills: 0 | Status: SHIPPED | OUTPATIENT
Start: 2025-06-10 | End: 2025-06-14

## 2025-06-17 DIAGNOSIS — F33.1 MODERATE EPISODE OF RECURRENT MAJOR DEPRESSIVE DISORDER (HCC): ICD-10-CM

## 2025-06-17 DIAGNOSIS — B00.2 RECURRENT ORAL HERPES SIMPLEX: ICD-10-CM

## 2025-06-17 RX ORDER — FUROSEMIDE 20 MG/1
20 TABLET ORAL DAILY
Qty: 90 TABLET | Refills: 1 | Status: SHIPPED | OUTPATIENT
Start: 2025-06-17

## 2025-06-17 RX ORDER — BUPROPION HYDROCHLORIDE 150 MG/1
TABLET ORAL
Qty: 270 TABLET | Refills: 1 | Status: SHIPPED | OUTPATIENT
Start: 2025-06-17

## 2025-06-17 RX ORDER — VALACYCLOVIR HYDROCHLORIDE 500 MG/1
500 TABLET, FILM COATED ORAL DAILY
Qty: 90 TABLET | Refills: 1 | Status: SHIPPED | OUTPATIENT
Start: 2025-06-17

## 2025-06-29 DIAGNOSIS — G43.011 INTRACTABLE MIGRAINE WITHOUT AURA AND WITH STATUS MIGRAINOSUS: ICD-10-CM

## 2025-07-01 RX ORDER — ERENUMAB-AOOE 70 MG/ML
70 INJECTION SUBCUTANEOUS
Qty: 3 ML | Refills: 3 | Status: SHIPPED | OUTPATIENT
Start: 2025-07-01 | End: 2025-07-02 | Stop reason: SDUPTHER

## 2025-07-02 ENCOUNTER — TELEPHONE (OUTPATIENT)
Dept: PHARMACY | Age: 45
End: 2025-07-02

## 2025-07-02 DIAGNOSIS — G43.011 INTRACTABLE MIGRAINE WITHOUT AURA AND WITH STATUS MIGRAINOSUS: Primary | ICD-10-CM

## 2025-07-02 RX ORDER — ERENUMAB-AOOE 70 MG/ML
70 INJECTION SUBCUTANEOUS
Qty: 3 ML | Refills: 3 | Status: SHIPPED | OUTPATIENT
Start: 2025-07-02

## 2025-07-02 NOTE — TELEPHONE ENCOUNTER
Specialty Medication Service    Date: 7/2/2025  Patient's Name: Brooke Gomez YOB: 1980            _____________________________________________________________________________________________     Brooke Goemz is a 45 y.o.  female enrolled in the Specialty Medication Service program. Refill request received for Aimovig.    Patient does have active CPA on file.   Patient last SMS pharmacist visit was within the last 6 months.  Patient last medical director visit was within the last year.  Patient has seen their specialist within the last year.   Labs are not necessary .   SMS medical director referral is on file: yes  Next SMS visit is anticipated for 7/21/25.   Correct SMS department for prescribing yes    Chart reviewed. No significant concerns noted, patient is compliant with the program. She is due for pharmD follow-up this month. If patient not seen, will cancel refills and place override in [employer].      Will approve the refill for #3mL + 3 refills, per the original provider order from 7/1/25.      Orders Placed This Encounter    Erenumab-aooe (AIMOVIG) 70 MG/ML SOAJ     Sig: Inject 70 mg into the skin every 30 days     Dispense:  3 mL     Refill:  3     Keily Beard PharmD, [facility]  Ambulatory Clinical Pharmacist   Specialty Medication Services   Phone: [phone] option 4  7/2/2025 8:18 AM     For Pharmacy Admin Tracking Only    Program: SMS  CPA in place:  Yes  Recommendation Provided To: Patient/Caregiver: 1 via Telephone  Intervention Detail: Refill(s) Provided  Intervention Accepted By: Patient/Caregiver: 1   Time Spent (min): 15

## 2025-07-02 NOTE — TELEPHONE ENCOUNTER
Specialty Medication Service    Date: 7/2/2025  Patient's Name: Brooke Gomez YOB: 1980            _____________________________________________________________________________________________    Brooke Gomez is a 45 y.o. female enrolled in the Specialty Medication Service.     We received a refill request for AImovig on 07/02/25.     Original Rx was written for 4 fills on 07/01/25.     Thank you,    Christianne Rivera      Requested Prescriptions     Pending Prescriptions Disp Refills    Erenumab-aooe (AIMOVIG) 70 MG/ML SOAJ 3 mL 3     Sig: Inject 70 mg into the skin every 30 days

## 2025-07-03 ENCOUNTER — TELEPHONE (OUTPATIENT)
Dept: PHARMACY | Age: 45
End: 2025-07-03

## 2025-07-03 NOTE — TELEPHONE ENCOUNTER
Specialty Medication Service    Date: 7/3/2025  Patient's Name: Brooke Gomez YOB: 1980            _____________________________________________________________________________________________    Spoke with patient to schedule PharmD follow up appointment for Specialty Medication Services. Patient scheduled 07/15/25 at 930 am.     Jannie Rivera CPhT  Pharmacy   Specialty Medication Services   Phone: [phone] option 4    For Pharmacy Admin Tracking Only    Program: [facility]  CPA in place:  Yes  Recommendation Provided To: Patient/Caregiver: 1 via Telephone  Intervention Detail: Scheduled Appointment  Intervention Accepted By: Patient/Caregiver: 1  Gap Closed?:    Time Spent (min): 15

## 2025-07-15 NOTE — PROGRESS NOTES
Specialty Medication Service    Patient's Name: Brooke Gomez YOB: 1980      Reason for visit: Brooke Gomez is a 45 y.o. female presenting today for Specialty Medication Service visit follow up. Patient last seen by SMS 25. Patient continues on [facility] formulary medication, Aimovig. Pharmacy completed Specialty Medication Service visit for medication monitoring and counseling. Medication list updated.    Specialty Medication: Aimovig 70mg/mL Autoinjector   Frequency: Every 30 days   Indication: Intractable migraine without aura and with status migrainosus   Initially Diagnosed: several years ago but got severe ~2019  Additional Therapy:   Rizatriptan PRN   Previous Therapy:   Topiramate - facial twitching  Propranolol - felt \"blah\"  Ubrelvy and Nurtec - insurance      Specialist:   RICA Beltrán - CNP  [facility]   [address] [place] [address]  [phone]  Specialist Progress Note Available: Yes, EPIC ([facility] Provider)  Last Specialist Visit:   3/19/25 - no mention of migraines.      No Known Allergies    Past Medical History:   Diagnosis Date    Anxiety     Asthma     Depression     Ectopic pregnancy     Endometriosis     Headache     Kidney stones     Menorrhagia with irregular cycle       Social History     Tobacco Use    Smoking status: Former     Current packs/day: 0.00     Types: Cigarettes     Quit date: 2018     Years since quittin.5    Smokeless tobacco: Never    Tobacco comments:     Quit smoking : started again and quit again in 2018   Substance Use Topics    Alcohol use: Yes     Alcohol/week: 6.0 standard drinks of alcohol     Types: 6 Shots of liquor per week     Comment: socially     Family History   Problem Relation Age of Onset    High Blood Pressure Mother         DDD    Diabetes Mother     High Blood Pressure Father     Diabetes Father     Breast Cancer Maternal Grandmother         over 50    Diabetes Maternal Grandmother     Arthritis Maternal

## 2025-07-16 ENCOUNTER — TELEPHONE (OUTPATIENT)
Dept: INTERNAL MEDICINE CLINIC | Age: 45
End: 2025-07-16

## 2025-07-16 ENCOUNTER — PHARMACY VISIT (OUTPATIENT)
Dept: PHARMACY | Age: 45
End: 2025-07-16

## 2025-07-16 DIAGNOSIS — G43.011 INTRACTABLE MIGRAINE WITHOUT AURA AND WITH STATUS MIGRAINOSUS: Primary | ICD-10-CM

## 2025-07-16 DIAGNOSIS — G43.011 INTRACTABLE MIGRAINE WITHOUT AURA AND WITH STATUS MIGRAINOSUS: ICD-10-CM

## 2025-07-16 RX ORDER — RIZATRIPTAN BENZOATE 10 MG/1
TABLET ORAL
Qty: 9 TABLET | Refills: 3 | Status: SHIPPED | OUTPATIENT
Start: 2025-07-16

## 2025-07-16 ASSESSMENT — PATIENT HEALTH QUESTIONNAIRE - PHQ9
10. IF YOU CHECKED OFF ANY PROBLEMS, HOW DIFFICULT HAVE THESE PROBLEMS MADE IT FOR YOU TO DO YOUR WORK, TAKE CARE OF THINGS AT HOME, OR GET ALONG WITH OTHER PEOPLE: NOT DIFFICULT AT ALL
SUM OF ALL RESPONSES TO PHQ QUESTIONS 1-9: 0
8. MOVING OR SPEAKING SO SLOWLY THAT OTHER PEOPLE COULD HAVE NOTICED. OR THE OPPOSITE - BEING SO FIDGETY OR RESTLESS THAT YOU HAVE BEEN MOVING AROUND A LOT MORE THAN USUAL: NOT AT ALL
SUM OF ALL RESPONSES TO PHQ QUESTIONS 1-9: 0
SUM OF ALL RESPONSES TO PHQ QUESTIONS 1-9: 0
7. TROUBLE CONCENTRATING ON THINGS, SUCH AS READING THE NEWSPAPER OR WATCHING TELEVISION: NOT AT ALL
SUM OF ALL RESPONSES TO PHQ QUESTIONS 1-9: 0
3. TROUBLE FALLING OR STAYING ASLEEP: NOT AT ALL
1. LITTLE INTEREST OR PLEASURE IN DOING THINGS: NOT AT ALL
6. FEELING BAD ABOUT YOURSELF - OR THAT YOU ARE A FAILURE OR HAVE LET YOURSELF OR YOUR FAMILY DOWN: NOT AT ALL
9. THOUGHTS THAT YOU WOULD BE BETTER OFF DEAD, OR OF HURTING YOURSELF: NOT AT ALL
1. LITTLE INTEREST OR PLEASURE IN DOING THINGS: NOT AT ALL
5. POOR APPETITE OR OVEREATING: NOT AT ALL
10. IF YOU CHECKED OFF ANY PROBLEMS, HOW DIFFICULT HAVE THESE PROBLEMS MADE IT FOR YOU TO DO YOUR WORK, TAKE CARE OF THINGS AT HOME, OR GET ALONG WITH OTHER PEOPLE: NOT DIFFICULT AT ALL
6. FEELING BAD ABOUT YOURSELF - OR THAT YOU ARE A FAILURE OR HAVE LET YOURSELF OR YOUR FAMILY DOWN: NOT AT ALL
SUM OF ALL RESPONSES TO PHQ QUESTIONS 1-9: 0
3. TROUBLE FALLING OR STAYING ASLEEP: NOT AT ALL
2. FEELING DOWN, DEPRESSED OR HOPELESS: NOT AT ALL
4. FEELING TIRED OR HAVING LITTLE ENERGY: NOT AT ALL
2. FEELING DOWN, DEPRESSED OR HOPELESS: NOT AT ALL
5. POOR APPETITE OR OVEREATING: NOT AT ALL
7. TROUBLE CONCENTRATING ON THINGS, SUCH AS READING THE NEWSPAPER OR WATCHING TELEVISION: NOT AT ALL
9. THOUGHTS THAT YOU WOULD BE BETTER OFF DEAD, OR OF HURTING YOURSELF: NOT AT ALL
8. MOVING OR SPEAKING SO SLOWLY THAT OTHER PEOPLE COULD HAVE NOTICED. OR THE OPPOSITE, BEING SO FIGETY OR RESTLESS THAT YOU HAVE BEEN MOVING AROUND A LOT MORE THAN USUAL: NOT AT ALL
4. FEELING TIRED OR HAVING LITTLE ENERGY: NOT AT ALL

## 2025-07-16 ASSESSMENT — PROMIS GLOBAL HEALTH SCALE
SUM OF RESPONSES TO QUESTIONS 2, 4, 5, & 10: 19
IN GENERAL, WOULD YOU SAY YOUR HEALTH IS...[ON A SCALE OF 1 (POOR) TO 5 (EXCELLENT)]: EXCELLENT
IN THE PAST 7 DAYS, HOW OFTEN HAVE YOU BEEN BOTHERED BY EMOTIONAL PROBLEMS, SUCH AS FEELING ANXIOUS, DEPRESSED, OR IRRITABLE [ON A SCALE FROM 1 (NEVER) TO 5 (ALWAYS)]?: RARELY
IN GENERAL, PLEASE RATE HOW WELL YOU CARRY OUT YOUR USUAL SOCIAL ACTIVITIES (INCLUDES ACTIVITIES AT HOME, AT WORK, AND IN YOUR COMMUNITY, AND RESPONSIBILITIES AS A PARENT, CHILD, SPOUSE, EMPLOYEE, FRIEND, ETC) [ON A SCALE OF 1 (POOR) TO 5 (EXCELLENT)]?: EXCELLENT
IN GENERAL, HOW WOULD YOU RATE YOUR PHYSICAL HEALTH [ON A SCALE OF 1 (POOR) TO 5 (EXCELLENT)]?: EXCELLENT
IN THE PAST 7 DAYS, HOW WOULD YOU RATE YOUR PAIN ON AVERAGE [ON A SCALE FROM 0 (NO PAIN) TO 10 (WORST IMAGINABLE PAIN)]?: 4
SUM OF RESPONSES TO QUESTIONS 3, 6, 7, & 8: 19
IN GENERAL, HOW WOULD YOU RATE YOUR MENTAL HEALTH, INCLUDING YOUR MOOD AND YOUR ABILITY TO THINK [ON A SCALE OF 1 (POOR) TO 5 (EXCELLENT)]?: EXCELLENT
IN GENERAL, WOULD YOU SAY YOUR QUALITY OF LIFE IS...[ON A SCALE OF 1 (POOR) TO 5 (EXCELLENT)]: EXCELLENT
IN GENERAL, HOW WOULD YOU RATE YOUR SATISFACTION WITH YOUR SOCIAL ACTIVITIES AND RELATIONSHIPS [ON A SCALE OF 1 (POOR) TO 5 (EXCELLENT)]?: EXCELLENT
TO WHAT EXTENT ARE YOU ABLE TO CARRY OUT YOUR EVERYDAY PHYSICAL ACTIVITIES SUCH AS WALKING, CLIMBING STAIRS, CARRYING GROCERIES, OR MOVING A CHAIR [ON A SCALE OF 1 (NOT AT ALL) TO 5 (COMPLETELY)]?: COMPLETELY

## 2025-07-16 NOTE — TELEPHONE ENCOUNTER
Specialty Medication Service     Date: 7/16/2025  Patient's Name: Brooke Gomez       YOB: 1980             _____________________________________________________________________________________________     Spoke with patient today to review her Aimovig 70mg RX through the [facility] SMS clinical monitoring program. During visit today, she reported improvement of migraines from baseline but frequency has recently increased since early June. She is now have 203 migraines per month that last several hours (if she takes rizatriptan) or all day if she does not use. Prior to June she was having 0-1 migraine per month.     She is currently on the 70mg dose of Aimovig. She main gain additional benefit in migraine reduction by increasing to 140mg dose monthly. Discussed with patient and agreeable for me to reach out to her prescriber.      While reviewing migraines she also notes she tries to use the rizatriptan sparingly due to it causing drowsiness. She previously tried Nurtec and Ubrelvy and reported they were effective but were not covered by insurance. With her current [facility] benefits, Nurtec and Ubrelvy are covered for PRN treatment as long as step therapy is met (trial of a triptan).      Keily Beard, PharmD, BCACP  Ambulatory Clinical Pharmacist   Specialty Medication Services   Phone: [phone] option 4  7/16/2025 10:24 AM

## 2025-07-16 NOTE — TELEPHONE ENCOUNTER
Specialty Medication Service    Date: 7/16/2025  Patient's Name: Brooke Gomez YOB: 1980            _____________________________________________________________________________________________    Spoke with patient today to review her Aimovig 70mg RX through the [facility] SMS clinical monitoring program. During visit today, she reported improvement of migraines from baseline but frequency has recently increased since early June. She is now have 203 migraines per month that last several hours (if she takes rizatriptan) or all day if she does not use. Prior to June she was having 0-1 migraine per month.    She is currently on the 70mg dose of Aimovig. She main gain additional benefit in migraine reduction by increasing to 140mg dose monthly. Discussed with patient and agreeable for me to reach out to her prescriber.     While reviewing migraines she also notes she tries to use the rizatriptan sparingly due to it causing drowsiness. She previously tried Nurtec and Ubrelvy and reported they were effective but were not covered by insurance. With her current [facility] benefits, Nurtec and Ubrelvy are covered for PRN treatment as long as step therapy is met (trial of a triptan).     Keily Beard, PharmD, BCACP  Ambulatory Clinical Pharmacist   Specialty Medication Services   Phone: [phone] option 4  7/16/2025 10:24 AM

## 2025-07-21 RX ORDER — ERENUMAB-AOOE 140 MG/ML
140 INJECTION, SOLUTION SUBCUTANEOUS
Qty: 3 ML | Refills: 1 | Status: SHIPPED | OUTPATIENT
Start: 2025-07-21 | End: 2025-07-22 | Stop reason: SDUPTHER

## 2025-07-22 ENCOUNTER — TELEPHONE (OUTPATIENT)
Dept: PHARMACY | Age: 45
End: 2025-07-22

## 2025-07-22 DIAGNOSIS — G43.011 INTRACTABLE MIGRAINE WITHOUT AURA AND WITH STATUS MIGRAINOSUS: Primary | ICD-10-CM

## 2025-07-22 RX ORDER — ERENUMAB-AOOE 140 MG/ML
INJECTION, SOLUTION SUBCUTANEOUS
Qty: 3 ML | Refills: 1 | Status: SHIPPED | OUTPATIENT
Start: 2025-07-22

## 2025-07-22 NOTE — TELEPHONE ENCOUNTER
Specialty Medication Service    Date: 7/22/2025  Patient's Name: Brooke Gomez YOB: 1980            _____________________________________________________________________________________________    Provider approved switch to Aimovig 140 mg as well as switching to Nurtec. Pharmacy faxed office key to [employer] Meds for Nurtec as it will need a PA. Will send in a message to office as well today.     Jeff Syed, PharmD [facility]  Ambulatory Clinical Pharmacist  Specialty Medication Services  Phone: [phone] option #4  Fax: [phone]

## 2025-07-22 NOTE — TELEPHONE ENCOUNTER
Specialty Medication Service    Date: 7/22/2025  Patient's Name: Brooke Gomez YOB: 1980            _____________________________________________________________________________________________    Brooke Gomez is a 45 y.o. female enrolled in the Specialty Medication Service.     We received a refill request for Aimovig on 07/22/25.     Original Rx was written for 1+1 fills on 07/21/25.     Thank you,    Kori Grey      Requested Prescriptions     Pending Prescriptions Disp Refills    Erenumab-aooe (AIMOVIG) 140 MG/ML SOAJ 3 mL 1     Sig: Inject 140 mg into the skin every 30 days

## 2025-07-22 NOTE — TELEPHONE ENCOUNTER
Specialty Medication Service    Date: 7/22/2025  Patient's Name: Brooke Gomez YOB: 1980            _____________________________________________________________________________________________     Brooke Gomez is a 45 y.o.  female enrolled in the Specialty Medication Service program. Refill request received for Aimovig.    Patient does have active CPA on file.   Patient last SMS pharmacist visit was within the last 6 months.  Patient last medical director visit was within the last year.  Patient has seen their specialist within the last year.   Labs are not necessary .   SMS medical director referral is on file: yes  Next SMS visit is scheduled 08/12/2025.  Correct SMS department for prescribing yes    Chart reviewed. No significant concerns noted, patient is compliant with the program.     Will approve the refill for 2 (90 day) refills, per the original provider order.      Orders Placed This Encounter    Erenumab-aooe (AIMOVIG) 140 MG/ML SOAJ     Sig: Inject 140 mg (1 pen) under the skin every 30 days     Dispense:  3 mL     Refill:  1          Jeff Syed PharmD [facility]  Ambulatory Clinical Pharmacist  Specialty Medication Services  Phone: [phone] option #4  Fax: [phone]     For Pharmacy Admin Tracking Only    Program: [facility] [place] [facility]  CPA in place:  Yes  Recommendation Provided To: Patient/Caregiver: 1 via Telephone  Intervention Detail: Refill(s) Provided  Intervention Accepted By: Patient/Caregiver: 1    Time Spent (min): 10

## 2025-07-23 ENCOUNTER — TELEPHONE (OUTPATIENT)
Dept: ADMINISTRATIVE | Age: 45
End: 2025-07-23

## 2025-07-24 RX ORDER — RIMEGEPANT SULFATE 75 MG/75MG
1 TABLET, ORALLY DISINTEGRATING ORAL DAILY PRN
Qty: 2 TABLET | Refills: 0 | Status: SHIPPED | COMMUNITY
Start: 2025-07-24

## 2025-07-28 ENCOUNTER — PATIENT MESSAGE (OUTPATIENT)
Dept: PHARMACY | Age: 45
End: 2025-07-28

## 2025-08-01 DIAGNOSIS — G43.011 INTRACTABLE MIGRAINE WITHOUT AURA AND WITH STATUS MIGRAINOSUS: ICD-10-CM

## 2025-08-01 DIAGNOSIS — U07.1 COVID: Primary | ICD-10-CM

## 2025-08-05 ENCOUNTER — TELEPHONE (OUTPATIENT)
Dept: INTERNAL MEDICINE CLINIC | Age: 45
End: 2025-08-05

## 2025-08-12 ENCOUNTER — PHARMACY VISIT (OUTPATIENT)
Dept: PHARMACY | Age: 45
End: 2025-08-12

## 2025-08-12 DIAGNOSIS — G43.709 CHRONIC MIGRAINE WITHOUT AURA WITHOUT STATUS MIGRAINOSUS, NOT INTRACTABLE: Primary | ICD-10-CM

## 2025-08-29 ENCOUNTER — PATIENT MESSAGE (OUTPATIENT)
Dept: INTERNAL MEDICINE CLINIC | Age: 45
End: 2025-08-29

## 2025-08-29 RX ORDER — PREDNISONE 10 MG/1
TABLET ORAL
Qty: 30 TABLET | Refills: 0 | Status: SHIPPED | OUTPATIENT
Start: 2025-08-29 | End: 2025-09-10

## (undated) DEVICE — COVER,MAYO STAND,STERILE: Brand: MEDLINE

## (undated) DEVICE — APPLICATOR MEDICATED 26 CC SOLUTION HI LT ORNG CHLORAPREP

## (undated) DEVICE — LOTION PREP REMV 5OZ IODO CLR TINC OF BENZ DURAPREP

## (undated) DEVICE — MERCY FAIRFIELD TURNOVER KIT: Brand: MEDLINE INDUSTRIES, INC.

## (undated) DEVICE — TOTAL TRAY, DB, 100% SILI FOLEY, 16FR 10: Brand: MEDLINE

## (undated) DEVICE — BASIC SINGLE BASIN 1-LF: Brand: MEDLINE INDUSTRIES, INC.

## (undated) DEVICE — ARM DRAPE

## (undated) DEVICE — AIRSEAL 8 MM ACCESS PORT AND LOW PROFILE OBTURATOR WITH BLADELESS OPTICAL TIP, 120 MM LENGTH: Brand: AIRSEAL

## (undated) DEVICE — INSUFFLATION NEEDLE TO ESTABLISH PNEUMOPERITONEUM.: Brand: INSUFFLATION NEEDLE

## (undated) DEVICE — GLOVE SURG SZ 65 CRM LTX FREE POLYISOPRENE POLYMER BEAD ANTI

## (undated) DEVICE — SNAPSECURE FOLEY DEVICE: Brand: MEDLINE

## (undated) DEVICE — VAG HYSTERECTOMY-LF: Brand: MEDLINE INDUSTRIES, INC.

## (undated) DEVICE — STERILE POLYISOPRENE POWDER-FREE SURGICAL GLOVES: Brand: PROTEXIS

## (undated) DEVICE — GAUZE,SPONGE,4"X4",8PLY,STRL,LF,10/TRAY: Brand: MEDLINE

## (undated) DEVICE — SOLUTION IRRIG 500ML 0.9% SOD CHLO USP POUR PLAS BTL

## (undated) DEVICE — SUTURE MCRYL + SZ 4-0 L27IN ABSRB UD L19MM PS-2 3/8 CIR MCP426H

## (undated) DEVICE — SUTURE NONABSORBABLE MONOFILAMENT 4-0 PS-2 18 IN BLK ETHILON 1667G

## (undated) DEVICE — SYRINGE, LUER LOCK, 10ML: Brand: MEDLINE

## (undated) DEVICE — PENCIL SMK EVAC TELSCP 3 M TBNG

## (undated) DEVICE — SOLUTION IV 1000ML LAC RINGERS PH 6.5 INJ USP VIAFLX PLAS

## (undated) DEVICE — BLANKET WRM W29.9XL79.1IN UP BODY FORC AIR MISTRAL-AIR

## (undated) DEVICE — Device

## (undated) DEVICE — SUTURE NONABSORBABLE MONOFILAMENT 3-0 PS-1 18 IN BLK ETHILON 1663H

## (undated) DEVICE — WET SKIN PREP TRAY: Brand: MEDLINE INDUSTRIES, INC.

## (undated) DEVICE — GLOVE SURG SZ 65 THK91MIL LTX FREE SYN POLYISOPRENE

## (undated) DEVICE — DRAPE HND W114XL142IN BLU POLYPR W O PCH FEN CRD AND TB HLDR

## (undated) DEVICE — SUTURE MONOCRYL + SZ 4-0 L27IN ABSRB UD L19MM PS-2 3/8 CIR MCP426H

## (undated) DEVICE — 30978 SEE SHARP - ENHANCED INTRAOPERATIVE LAPAROSCOPE CLEANING & DEFOGGING: Brand: 30978 SEE SHARP - ENHANCED INTRAOPERATIVE LAPAROSCOPE CLEANING & DEFOGGING

## (undated) DEVICE — BLADELESS OBTURATOR: Brand: WECK VISTA

## (undated) DEVICE — GOWN,AURORA,NONREINF,RAGLAN,XXL,STERILE: Brand: MEDLINE

## (undated) DEVICE — SUTURE VCRL + SZ 0 L27IN ABSRB WHT CT-2 1/2 CIR TAPERPOINT VCP270H

## (undated) DEVICE — TOWEL,OR,DSP,ST,BLUE,STD,6/PK,12PK/CS: Brand: MEDLINE

## (undated) DEVICE — PAD,ABDOMINAL,8"X10",ST,LF: Brand: MEDLINE

## (undated) DEVICE — 3 ML SYRINGE LUER-LOCK TIP: Brand: MONOJECT

## (undated) DEVICE — 1LYRTR 16FR10ML 100%SILI SNAP: Brand: MEDLINE INDUSTRIES, INC.

## (undated) DEVICE — TRI-LUMEN FILTERED TUBE SET WITH ACTIVATED CHARCOAL FILTER: Brand: AIRSEAL

## (undated) DEVICE — CYSTO/BLADDER IRRIGATION SET, REGULATING CLAMP

## (undated) DEVICE — APPLICATOR PREP 26ML 0.7% IOD POVACRYLEX 74% ISO ALC ST

## (undated) DEVICE — MANIPULATOR UTER 3CM ULTEM PLAS CUP DELINEATOR FOR USE W/

## (undated) DEVICE — INVIEW CLEAR LEGGINGS: Brand: CONVERTORS

## (undated) DEVICE — SINGLE USE AIR/WATER, SUCTION AND BIOPSY VALVES SET: Brand: ORCAPOD™

## (undated) DEVICE — ROBOTIC PK

## (undated) DEVICE — TIP COVER ACCESSORY

## (undated) DEVICE — HYPODERMIC SAFETY NEEDLE: Brand: MAGELLAN

## (undated) DEVICE — SUTURE ABSRB L12IN L12MM SZ 2-0 GS-22 VLT GLYCOLIDE VLOCM2115

## (undated) DEVICE — SPONGE LAP W18XL18IN WHT COT 4 PLY FLD STRUNG RADPQ DISP ST 2 PER PACK

## (undated) DEVICE — BLADE CLIPPER GEN PURP NS

## (undated) DEVICE — COVER LT HNDL BLU PLAS

## (undated) DEVICE — SYRINGE MED 30ML STD CLR PLAS LUERLOCK TIP N CTRL DISP

## (undated) DEVICE — SNARE COLD DIAMOND 15MM THIN

## (undated) DEVICE — DRESSING,GAUZE,XEROFORM,CURAD,1"X8",ST: Brand: CURAD

## (undated) DEVICE — SOLUTION IRRIG 500ML STRL H2O NONPYROGENIC

## (undated) DEVICE — SOLUTION IRRIG 1000ML 0.9% SOD CHL USP POUR PLAS BTL

## (undated) DEVICE — SUTURE PDS + SZ 0 L36IN ABSRB VLT CT 1 L36MM 1 2 CIR PDP346H

## (undated) DEVICE — TRAP SPEC RETRV CLR PLAS POLYP IN LN SUCT QUIK CTCH

## (undated) DEVICE — CANNULA SEAL

## (undated) DEVICE — STANDARD HYPODERMIC NEEDLE,POLYPROPYLENE HUB: Brand: MONOJECT

## (undated) DEVICE — AIR/WATER CLEANING ADAPTER FOR OLYMPUS® GI ENDOSCOPE: Brand: BULLDOG®

## (undated) DEVICE — BW-412T DISP COMBO CLEANING BRUSH: Brand: SINGLE USE COMBINATION CLEANING BRUSH

## (undated) DEVICE — ENDOSCOPIC KIT 6X3/16 FT COLON W/ 1.1 OZ 2 GWN W/O BRSH

## (undated) DEVICE — GLOVE SURG SZ 65 L12IN FNGR THK79MIL GRN LTX FREE

## (undated) DEVICE — UPPER EXTREMTY: Brand: MEDLINE INDUSTRIES, INC.

## (undated) DEVICE — SCRUB SURG BDINE FREE 4 OZ TECHNICARE

## (undated) DEVICE — LIQUIBAND RAPID ADHESIVE 36/CS 0.8ML: Brand: MEDLINE

## (undated) DEVICE — INTENDED FOR TISSUE SEPARATION, AND OTHER PROCEDURES THAT REQUIRE A SHARP SURGICAL BLADE TO PUNCTURE OR CUT.: Brand: BARD-PARKER ® STAINLESS STEEL BLADES

## (undated) DEVICE — BANDAGE COMPR W4INXL5YD BGE HI E W/ REM CLP SURE-WRAP

## (undated) DEVICE — PADDING CAST CRIMPED FINISH STD 4 YDX3 IN COTTON WBRL II

## (undated) DEVICE — SUTURE VICRYL + SZ 3-0 L18IN ABSRB UD SH 1/2 CIR TAPERCUT NDL VCP864D

## (undated) DEVICE — ELECTRODE PT RET AD L9FT HI MOIST COND ADH HYDRGEL CORDED

## (undated) DEVICE — PROTECTOR EYE PT SELF ADH NS OPT GRD LF

## (undated) DEVICE — PADDING CAST W4INXL4YD ST COT RAYON MICROPLEATED HIGHLY